# Patient Record
Sex: FEMALE | Race: WHITE | NOT HISPANIC OR LATINO | Employment: FULL TIME | URBAN - METROPOLITAN AREA
[De-identification: names, ages, dates, MRNs, and addresses within clinical notes are randomized per-mention and may not be internally consistent; named-entity substitution may affect disease eponyms.]

---

## 2017-01-17 ENCOUNTER — HOSPITAL ENCOUNTER (OUTPATIENT)
Dept: LAB | Facility: MEDICAL CENTER | Age: 42
End: 2017-01-17
Attending: FAMILY MEDICINE
Payer: COMMERCIAL

## 2017-01-17 ENCOUNTER — OFFICE VISIT (OUTPATIENT)
Dept: MEDICAL GROUP | Facility: MEDICAL CENTER | Age: 42
End: 2017-01-17
Payer: COMMERCIAL

## 2017-01-17 VITALS
BODY MASS INDEX: 32.14 KG/M2 | OXYGEN SATURATION: 96 % | WEIGHT: 200 LBS | TEMPERATURE: 98.3 F | SYSTOLIC BLOOD PRESSURE: 92 MMHG | DIASTOLIC BLOOD PRESSURE: 60 MMHG | HEART RATE: 71 BPM | HEIGHT: 66 IN

## 2017-01-17 DIAGNOSIS — T78.00XD: ICD-10-CM

## 2017-01-17 DIAGNOSIS — R53.82 CHRONIC FATIGUE: ICD-10-CM

## 2017-01-17 DIAGNOSIS — Z97.5 IUD (INTRAUTERINE DEVICE) IN PLACE: ICD-10-CM

## 2017-01-17 PROBLEM — T78.00XA ALLERGY WITH ANAPHYLAXIS DUE TO FOOD: Status: ACTIVE | Noted: 2017-01-17

## 2017-01-17 LAB
25(OH)D3 SERPL-MCNC: 18 NG/ML (ref 30–100)
BASOPHILS # BLD AUTO: 0.4 % (ref 0–1.8)
BASOPHILS # BLD: 0.03 K/UL (ref 0–0.12)
EOSINOPHIL # BLD AUTO: 0.12 K/UL (ref 0–0.51)
EOSINOPHIL NFR BLD: 1.7 % (ref 0–6.9)
ERYTHROCYTE [DISTWIDTH] IN BLOOD BY AUTOMATED COUNT: 41.7 FL (ref 35.9–50)
FERRITIN SERPL-MCNC: 81.5 NG/ML (ref 10–291)
FOLATE SERPL-MCNC: 21.1 NG/ML
HCT VFR BLD AUTO: 40.7 % (ref 37–47)
HGB BLD-MCNC: 14.6 G/DL (ref 12–16)
IMM GRANULOCYTES # BLD AUTO: 0.01 K/UL (ref 0–0.11)
IMM GRANULOCYTES NFR BLD AUTO: 0.1 % (ref 0–0.9)
IRON SATN MFR SERPL: 25 % (ref 15–55)
IRON SERPL-MCNC: 108 UG/DL (ref 40–170)
LYMPHOCYTES # BLD AUTO: 3.01 K/UL (ref 1–4.8)
LYMPHOCYTES NFR BLD: 41.7 % (ref 22–41)
MCH RBC QN AUTO: 32.7 PG (ref 27–33)
MCHC RBC AUTO-ENTMCNC: 35.9 G/DL (ref 33.6–35)
MCV RBC AUTO: 91.1 FL (ref 81.4–97.8)
MONOCYTES # BLD AUTO: 0.51 K/UL (ref 0–0.85)
MONOCYTES NFR BLD AUTO: 7.1 % (ref 0–13.4)
NEUTROPHILS # BLD AUTO: 3.53 K/UL (ref 2–7.15)
NEUTROPHILS NFR BLD: 49 % (ref 44–72)
NRBC # BLD AUTO: 0 K/UL
NRBC BLD AUTO-RTO: 0 /100 WBC
PLATELET # BLD AUTO: 197 K/UL (ref 164–446)
PMV BLD AUTO: 8.6 FL (ref 9–12.9)
RBC # BLD AUTO: 4.47 M/UL (ref 4.2–5.4)
TIBC SERPL-MCNC: 434 UG/DL (ref 250–450)
VIT B12 SERPL-MCNC: 590 PG/ML (ref 211–911)
WBC # BLD AUTO: 7.2 K/UL (ref 4.8–10.8)

## 2017-01-17 PROCEDURE — 82746 ASSAY OF FOLIC ACID SERUM: CPT

## 2017-01-17 PROCEDURE — 83550 IRON BINDING TEST: CPT

## 2017-01-17 PROCEDURE — 36415 COLL VENOUS BLD VENIPUNCTURE: CPT

## 2017-01-17 PROCEDURE — 85025 COMPLETE CBC W/AUTO DIFF WBC: CPT

## 2017-01-17 PROCEDURE — 99214 OFFICE O/P EST MOD 30 MIN: CPT | Performed by: FAMILY MEDICINE

## 2017-01-17 PROCEDURE — 82607 VITAMIN B-12: CPT

## 2017-01-17 PROCEDURE — 82306 VITAMIN D 25 HYDROXY: CPT

## 2017-01-17 PROCEDURE — 82728 ASSAY OF FERRITIN: CPT

## 2017-01-17 PROCEDURE — 83540 ASSAY OF IRON: CPT

## 2017-01-17 RX ORDER — DULAGLUTIDE 0.75 MG/.5ML
INJECTION, SOLUTION SUBCUTANEOUS
Refills: 12 | COMMUNITY
Start: 2016-12-26 | End: 2017-11-03

## 2017-01-17 RX ORDER — EPINEPHRINE 0.3 MG/.3ML
0.3 INJECTION SUBCUTANEOUS ONCE
Qty: 0.3 ML | Refills: 0 | Status: SHIPPED | OUTPATIENT
Start: 2017-01-17 | End: 2017-01-17

## 2017-01-17 RX ORDER — LEVOTHYROXINE SODIUM 175 UG/1
175 TABLET ORAL
Qty: 30 TAB | COMMUNITY
Start: 2017-01-17 | End: 2017-11-17

## 2017-01-17 NOTE — MR AVS SNAPSHOT
"        Aleena Diana   2017 4:00 PM   Office Visit   MRN: [id number]    Department:  [place] [address]   Dept Phone:  [phone]    Description:  Female : 1975   Provider:  Bright Barragan M.D.           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 Smoking Status             96% No Former Smoker         Basic Information     Date Of Birth Sex Race Ethnicity Preferred Language    1975 Female White Non- English      Your appointments     2017  4:00 PM   Established Patient with Myra Kurtz M.D.   [facility]    [address]  [address]  [facility] [id number] [phone]           You will be receiving a confirmation call a few days before your appointment from our automated call confirmation system.              Problem List              ICD-10-CM Priority Class Noted - Resolved    Diabetes type 2, controlled (CMS-HCC) E11.9   Unknown - Present    Hypothyroidism due to Hashimoto's thyroiditis E03.8, E06.3   10/6/2016 - Present    Obesity (BMI 30-39.9) E66.9   10/6/2016 - Present    Seasonal allergic rhinitis J30.2   10/6/2016 - Present    Erythema of lower extremity L53.9   10/6/2016 - Present    Mild intermittent asthma without " complication J45.20   10/6/2016 - Present    IUD (intrauterine device) in place Z97.5   1/17/2017 - Present    Chronic fatigue R53.82   1/17/2017 - Present    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   1/17/2017 - Present      Health Maintenance        Date Due Completion Dates    IMM HEP B VACCINE (1 of 3 - Primary Series) 1975 ---    A1C SCREENING 1975 ---    DIABETES MONOFILAMENT / LE EXAM 1975 ---    RETINAL SCREENING 3/23/1995 ---    FASTING LIPID PROFILE 3/23/1995 ---    URINE ACR / MICROALBUMIN 3/23/1995 ---    SERUM CREATININE 3/23/1995 ---    IMM DTaP/Tdap/Td Vaccine (1 - Tdap) 3/23/1996 ---    IMM PNEUMOCOCCAL 19-64 (ADULT) MEDIUM RISK SERIES (1 of 1 - PPSV23) 3/23/1996 ---    IMM INFLUENZA (1) 9/1/2016 ---    PAP SMEAR 1/25/2019 1/25/2016            Current Immunizations     No immunizations on file.      Below and/or attached are the medications your provider expects you to take. Review all of your home medications and newly ordered medications with your provider and/or pharmacist. Follow medication instructions as directed by your provider and/or pharmacist. Please keep your medication list with you and share with your provider. Update the information when medications are discontinued, doses are changed, or new medications (including over-the-counter products) are added; and carry medication information at all times in the event of emergency situations     Allergies:  CODEINE - (reactions not documented)     INFLUENZA VACCINES - Swelling     OTHER FOOD - (reactions not documented)     PENICILLINS - (reactions not documented)     TAPE - (reactions not documented)               Medications  Valid as of: January 17, 2017 -  4:23 PM    Generic Name Brand Name Tablet Size Instructions for use    Albuterol Sulfate (Aero Soln) albuterol 108 (90 BASE) MCG/ACT Inhale 2 Puffs by mouth every 6 hours as needed for Shortness of Breath.        Dulaglutide (Solution Pen-injector) TRULICITY 0.75  MG/0.5ML INJECT THE CONTENTS OF 1 SYRINGE SUBCUTANEOUSLY ONCE WEEKLY        EPINEPHrine (Solution Auto-injector) EPIPEN 0.3 MG/0.3ML 0.3 mL by Intramuscular route Once for 1 dose.        Levothyroxine Sodium (Tab) SYNTHROID 175 MCG Take 1 Tab by mouth Every morning on an empty stomach.        .                 Medicines prescribed today were sent to:     [id number] - [place], [place] - [address] [facility] [address]    [address] [place] [address]    Phone: [phone] Fax: [phone]    Open 24 Hours?: No      Medication refill instructions:       If your prescription bottle indicates you have medication refills left, it is not necessary to call your provider’s office. Please contact your pharmacy and they will refill your medication.    If your prescription bottle indicates you do not have any refills left, you may request refills at any time through one of the following ways: The online [employer] system (except Urgent Care), by calling your provider’s office, or by asking your pharmacy to contact your provider’s office with a refill request. Medication refills are processed only during regular business hours and may not be available until the next business day. Your provider may request additional information or to have a follow-up visit with you prior to refilling your medication.   *Please Note: Medication refills are assigned a new Rx number when refilled electronically. Your pharmacy may indicate that no refills were authorized even though a new prescription for the same medication is available at the pharmacy. Please request the medicine by name with the pharmacy before contacting your provider for a refill.        Your To Do List     Future Labs/Procedures Complete By Expires    CBC WITH DIFFERENTIAL  As directed 1/17/2018    FERRITIN  As directed 1/17/2018    FOLATE  As directed 1/17/2018    IRON/TOTAL IRON BIND  As directed 1/17/2018    VITAMIN B12  As  directed 1/17/2018    VITAMIN D,25 HYDROXY  As directed 1/17/2018         MyChart Access Code: Activation code not generated  Current [employer] Status: Active

## 2017-01-20 DIAGNOSIS — E55.9 VITAMIN D DEFICIENCY: ICD-10-CM

## 2017-01-20 RX ORDER — CHOLECALCIFEROL (VITAMIN D3) 1250 MCG
1 CAPSULE ORAL
Qty: 16 CAP | Refills: 0 | Status: SHIPPED | OUTPATIENT
Start: 2017-01-20 | End: 2019-11-29

## 2017-01-24 NOTE — PROGRESS NOTES
Subjective:     Chief Complaint   Patient presents with   • Follow-Up     feeling fatigue>>requesting for lab work///med refill epi pen       History of Present Illness:  Aleena Diana is a 41 y.o. female established patient who presents today to have initial medical evaluation of chronic fatigue (ongoing for ~2months), as well as medication refill for epipen:    Chronic fatigue  Patient complains of chronic fatigue with concerns that constant vaginal spotting is causing symptomatic anemia secondary to localized hormonal effects of hormone-type IUD that was recently put in place 2016.  Patient reports generalized weakness/fatigue, along with intermittent palpitations/tachycardia.      ROS is NEGATIVE for generalized pallor, dizziness, lightheadedness, blurred vision, chest pain/pressure, dyspnea, hematemesis, hemoptysis, hematochezia, melena, hematuria, pyuria, polyuria, increased frequency of urination, hand and foot tingling.    IUD (intrauterine device) in place  Please see note from same date of service 2017 re: Chronic fatigue.    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      Of note, patient states that her previous EpiPen prescription has .    ROS is NEGATIVE for increased lacrimation, b/l itchy eyes,  rhinorrhea, post-nasal drip, sneezing, wheezing, dyspnea, respiratory distress, palpitations, tachycardia, rash, generalized pruritis, rash/hives.      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)        Additional History:   Allergies:    Codeine; Influenza vaccines; Other food; Penicillins; and Tape     Current Medications:     Current Outpatient Prescriptions   Medication Sig Dispense Refill   • TRULICITY 0.75 MG/0.5ML Solution Pen-injector INJECT THE CONTENTS OF 1 SYRINGE SUBCUTANEOUSLY ONCE WEEKLY  12   • levothyroxine (SYNTHROID) 175 MCG Tab Take 1 Tab by mouth Every morning on an empty stomach. 30 Tab    • Cholecalciferol (VITAMIN D3) 93606 UNITS Cap Take 1 Cap by mouth every 7 days. 16 Cap 0   • albuterol 108 (90 BASE) MCG/ACT Aero Soln inhalation aerosol Inhale 2 Puffs by mouth every 6 hours as needed for Shortness of Breath. 8.5 g 3     No current facility-administered medications for this visit.        Social History:     Social History   Substance Use Topics   • Smoking status: Former Smoker     Quit date: 05/06/2013   • Smokeless tobacco: Never Used   • Alcohol Use: Yes      Comment: rare       ROS:     - ROS is NEGATIVE for vision/hearing changes, jaw pain/paresthesias, BUE pain/paresthesias/numbness/weakness, chest pain/pressure, palpitations, dyspnea, RUQ abdominal pain, oliguria/anuria, BLE edema.    - NOTE: All other systems reviewed and are negative, except as in HPI.     Objective:   Physical Exam:    Vitals: Blood pressure 92/60, pulse 71, temperature 36.8 °C (98.3 °F), height 1.676 m (5' 5.98\"), weight 90.719 kg (200 lb), SpO2 96 %, not currently breastfeeding.   BMI: Body mass index is 32.3 kg/(m^2).   General/Constitutional: Vitals as above, Well nourished, well developed female in no acute distress   Head/Eyes: Head is grossly normal & atraumatic, bilateral conjunctivae clear and not injected, bilateral EOMI, bilateral PERRL   ENT: Bilateral external ears grossly normal in appearance, Hearing grossly intact, External nares normal in appearance and without " discharge/bleeding   Respiratory: No respiratory distress, bilateral lungs are clear to ausculation in all lung fields (anterior/lateral/posterior), no wheezing/rhonchi/rales   Cardiovascular: Regular rate and rhythm without murmur/gallops/rubs, distal pulses are intact and equal bilaterally (radial, posterior tibial), no bilateral lower extremity edema   MSK: Gait grossly normal & not antalgic   Integumentary: No apparent rashes, normal capillary refill (<2 seconds), no generalized pallor   Psych: Judgment grossly appropriate, no apparent depression/anxiety    Assessment and Plan:   1. Chronic fatigue  Unstable, uncontrolled.  Patient and I discussed her differential diagnosis with two most likely etiologies being subacute  blood loss anemia vs. Vitamin D deficiency.  We will run the battery of labs as below to further evaluate.   - CBC WITH DIFFERENTIAL; Future   - VITAMIN D,25 HYDROXY; Future   - VITAMIN B12; Future   - IRON/TOTAL IRON BIND; Future   - FERRITIN; Future   - FOLATE; Future    2. IUD (intrauterine device) in place  Stable, in place since 11/2016.  Patient encouraged to follow-up with OB/Gyn for IUD counseling, in light of chronic vaginal spotting if not also for subacute  blood loss anemia.  Labs as above.    3. 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  Refilled Epipen.  Patient to consider further allergenic testing in the future.   - EPINEPHrine (EPIPEN) 0.3 MG/0.3ML Solution Auto-injector solution for injection; 0.3 mL by Intramuscular route Once for 1 dose.  Dispense: 0.3 mL; Refill: 0      PLEASE NOTE: This dictation was created using voice recognition software. I have made every reasonable attempt to correct obvious errors, but I expect that there are errors of grammar and possibly content that I did not discover before finalizing the note.

## 2017-01-24 NOTE — ASSESSMENT & PLAN NOTE
Patient states that she has former allergic reaction to certain foods (particularly nuts) which can cause feelings of airway closure.      Of note, patient states that her previous EpiPen prescription has .    ROS is NEGATIVE for increased lacrimation, b/l itchy eyes,  rhinorrhea, post-nasal drip, sneezing, wheezing, dyspnea, respiratory distress, palpitations, tachycardia, rash, generalized pruritis, rash/hives.

## 2017-01-24 NOTE — ASSESSMENT & PLAN NOTE
Patient complains of chronic fatigue with concerns that constant vaginal spotting is causing symptomatic anemia secondary to localized hormonal effects of hormone-type IUD that was recently put in place 11/2016.  Patient reports generalized weakness/fatigue, along with intermittent palpitations/tachycardia.      ROS is NEGATIVE for generalized pallor, dizziness, lightheadedness, blurred vision, chest pain/pressure, dyspnea, hematemesis, hemoptysis, hematochezia, melena, hematuria, pyuria, polyuria, increased frequency of urination, hand and foot tingling.

## 2017-03-01 ENCOUNTER — OFFICE VISIT (OUTPATIENT)
Dept: MEDICAL GROUP | Facility: CLINIC | Age: 42
End: 2017-03-01
Payer: COMMERCIAL

## 2017-03-01 VITALS
WEIGHT: 203.8 LBS | HEART RATE: 78 BPM | TEMPERATURE: 97.5 F | OXYGEN SATURATION: 97 % | RESPIRATION RATE: 18 BRPM | HEIGHT: 66 IN | SYSTOLIC BLOOD PRESSURE: 112 MMHG | BODY MASS INDEX: 32.75 KG/M2 | DIASTOLIC BLOOD PRESSURE: 62 MMHG

## 2017-03-01 DIAGNOSIS — B37.81 THRUSH OF MOUTH AND ESOPHAGUS (HCC): ICD-10-CM

## 2017-03-01 DIAGNOSIS — B37.0 THRUSH OF MOUTH AND ESOPHAGUS (HCC): ICD-10-CM

## 2017-03-01 PROCEDURE — 99213 OFFICE O/P EST LOW 20 MIN: CPT | Performed by: NURSE PRACTITIONER

## 2017-03-01 ASSESSMENT — ENCOUNTER SYMPTOMS
SORE THROAT: 0
CHILLS: 0
NAUSEA: 0
FEVER: 0
VOMITING: 0

## 2017-03-01 NOTE — PROGRESS NOTES
Chief Complaint   Patient presents with   • Mouth Lesions     her mouth feels raw, on monday her partial was cleaned with lysol wipes since it feels raw like she has throush        HISTORY OF PRESENT ILLNESS: Patient is a 41 y.o. female established patient who presents today due to possible mouth thrush. Patient reported that then she went to his dentist on Monday and her dentures top on the floor, they cleaned it and gave that back to her. She place her denture back and went home and started feeling raw, weird. She called back to her dentist and was told that they cleaning her denture with Lysol wipes. She felt the feeling is like mouth thrush and she usually has that when she is taking antibiotics so she knows the feeling. She started taking probiotic and she also took one dose of Diflucan but she is still worried about that and would like to be checked. She denied any nausea, vomiting, trouble swallowing, burning sensation or rashes.    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 16 Cap 0   • TRULICITY 0.75 MG/0.5ML Solution Pen-injector INJECT THE CONTENTS OF 1 SYRINGE SUBCUTANEOUSLY ONCE WEEKLY  12   • levothyroxine (SYNTHROID) 175 MCG Tab Take 1 Tab by mouth Every morning on an empty stomach. 30 Tab    • albuterol 108 (90 BASE)  "MCG/ACT Aero Soln inhalation aerosol Inhale 2 Puffs by mouth every 6 hours as needed for Shortness of Breath. 8.5 g 3     No current facility-administered medications for this visit.       Social History   Substance Use Topics   • Smoking status: Former Smoker     Quit date: 05/06/2013   • Smokeless tobacco: Never Used   • Alcohol Use: Yes      Comment: rare       Family Status   Relation Status Death Age   • Mother Alive    • Father Alive      Family History   Problem Relation Age of Onset   • Hypertension Mother    • Hypertension Father          ROS:  Review of Systems   Constitutional: Negative for fever and chills.   HENT: Negative for sore throat.    Gastrointestinal: Negative for nausea and vomiting.   Skin: Negative for itching and rash.        Objective:     Blood pressure 112/62, pulse 78, temperature 36.4 °C (97.5 °F), resp. rate 18, height 1.676 m (5' 6\"), weight 92.443 kg (203 lb 12.8 oz), last menstrual period 11/30/2016, SpO2 97 %, not currently breastfeeding.     Physical Exam:  Physical Exam   Constitutional: She is oriented to person, place, and time and well-developed, well-nourished, and in no distress.   HENT:   Head: Normocephalic and atraumatic.   Thin layer of white film noted on her hard palate.   Eyes: Conjunctivae are normal.   Neck: Neck supple. No JVD present.   Cardiovascular: Normal rate.    Pulmonary/Chest: Effort normal.   Neurological: She is alert and oriented to person, place, and time.   Skin: Skin is warm. No erythema.   Vitals reviewed.        Assessment/Plan:  1. Thrush of mouth and esophagus (CMS-HCC)  - nystatin (MYCOSTATIN) 443422 UNIT/ML Suspension; Take 5 mL by mouth 4 times a day. swish in the mouth and retain for as long as possible (several minutes) before swallowing  Dispense: 100 mL; Refill: 1  - Instructed patient to call back if worsening symptoms or new symptoms. She verbalized understanding.      "

## 2017-03-01 NOTE — MR AVS SNAPSHOT
"        Aleena Diana   3/1/2017 3:20 PM   Office Visit   MRN: [id number]    Department:  [facility]   Dept Phone:  [phone]    Description:  Female : 1975   Provider:  OSKAR Maldonado           Reason for Visit     Mouth Lesions her mouth feels raw, on monday her partial was cleaned with lysol wipes since it feels raw like she has throush       Allergies as of 3/1/2017     Allergen Noted Reactions    Codeine 2016       Influenza Vaccines 10/06/2016   Swelling    Arm became swollen and red    Other Food 2016       Nuts    Penicillins 2016       Tape 2016         You were diagnosed with     Thrush of mouth and esophagus (CMS-[facility])   [110337]         Vital Signs     Blood Pressure Pulse Temperature Respirations Height Weight    112/62 mmHg 78 36.4 °C (97.5 °F) 18 1.676 m (5' 6\") 92.443 kg (203 lb 12.8 oz)    Body Mass Index Oxygen Saturation Last Menstrual Period Breastfeeding? Smoking Status       32.91 kg/m2 97% 2016 No Former Smoker       Basic Information     Date Of Birth Sex Race Ethnicity Preferred Language    1975 Female White Non- English      Your appointments     2017  4:00 PM   Established Patient with Myra Kurtz M.D.   [facility] (--)    [address] [place] [id number] [phone]           You will be receiving a confirmation call a few days before your appointment from our automated call confirmation system.              Problem List              ICD-10-CM Priority Class Noted - Resolved    Diabetes type 2, controlled (CMS-[facility]) E11.9   Unknown - Present    Hypothyroidism due to Hashimoto's thyroiditis E03.8, E06.3   10/6/2016 - Present    Obesity (BMI 30-39.9) E66.9   10/6/2016 - Present    Seasonal allergic rhinitis J30.2   10/6/2016 - Present    Erythema of lower extremity L53.9   10/6/2016 - Present    Mild intermittent asthma without complication J45.20   " 10/6/2016 - Present    IUD (intrauterine device) in place Z97.5   1/17/2017 - Present    Chronic fatigue R53.82   1/17/2017 - Present    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   1/17/2017 - Present    Vitamin D deficiency E55.9   1/20/2017 - Present      Health Maintenance        Date Due Completion Dates    IMM HEP B VACCINE (1 of 3 - Primary Series) 1975 ---    A1C SCREENING 1975 ---    DIABETES MONOFILAMENT / LE EXAM 1977 ---    RETINAL SCREENING 3/23/1995 ---    FASTING LIPID PROFILE 3/23/1995 ---    URINE ACR / MICROALBUMIN 3/23/1995 ---    SERUM CREATININE 3/23/1995 ---    IMM DTaP/Tdap/Td Vaccine (1 - Tdap) 3/23/1996 ---    IMM PNEUMOCOCCAL 19-64 (ADULT) MEDIUM RISK SERIES (1 of 1 - PPSV23) 3/23/1996 ---    IMM INFLUENZA (1) 9/1/2016 ---    PAP SMEAR 1/25/2019 1/25/2016            Current Immunizations     No immunizations on file.      Below and/or attached are the medications your provider expects you to take. Review all of your home medications and newly ordered medications with your provider and/or pharmacist. Follow medication instructions as directed by your provider and/or pharmacist. Please keep your medication list with you and share with your provider. Update the information when medications are discontinued, doses are changed, or new medications (including over-the-counter products) are added; and carry medication information at all times in the event of emergency situations     Allergies:  CODEINE - (reactions not documented)     INFLUENZA VACCINES - Swelling     OTHER FOOD - (reactions not documented)     PENICILLINS - (reactions not documented)     TAPE - (reactions not documented)               Medications  Valid as of: March 01, 2017 -  4:32 PM    Generic Name Brand Name Tablet Size Instructions for use    Albuterol Sulfate (Aero Soln) albuterol 108 (90 BASE) MCG/ACT Inhale 2 Puffs by mouth every 6 hours as needed for Shortness of Breath.        Cholecalciferol (Cap)  Vitamin D3 89749 UNITS Take 1 Cap by mouth every 7 days.        Dulaglutide (Solution Pen-injector) TRULICITY 0.75 MG/0.5ML INJECT THE CONTENTS OF 1 SYRINGE SUBCUTANEOUSLY ONCE WEEKLY        Levothyroxine Sodium (Tab) SYNTHROID 175 MCG Take 1 Tab by mouth Every morning on an empty stomach.        Nystatin (Suspension) MYCOSTATIN 513555 UNIT/ML Take 5 mL by mouth 4 times a day. swish in the mouth and retain for as long as possible (several minutes) before swallowing        .                 Medicines prescribed today were sent to:     [id number] - [place], [place] - [address]  [facility] [address] [facility] [address] [place]    [address] [facility] [address] [place] [address]    Phone: [phone] Fax: [phone]    Open 24 Hours?: No      Medication refill instructions:       If your prescription bottle indicates you have medication refills left, it is not necessary to call your provider’s office. Please contact your pharmacy and they will refill your medication.    If your prescription bottle indicates you do not have any refills left, you may request refills at any time through one of the following ways: The online [employer] system (except Urgent Care), by calling your provider’s office, or by asking your pharmacy to contact your provider’s office with a refill request. Medication refills are processed only during regular business hours and may not be available until the next business day. Your provider may request additional information or to have a follow-up visit with you prior to refilling your medication.   *Please Note: Medication refills are assigned a new Rx number when refilled electronically. Your pharmacy may indicate that no refills were authorized even though a new prescription for the same medication is available at the pharmacy. Please request the medicine by name with the pharmacy before contacting your provider for a refill.           [employer] Access Code: Activation code not generated  Current  MyChart Status: Active

## 2017-04-19 ENCOUNTER — OFFICE VISIT (OUTPATIENT)
Dept: MEDICAL GROUP | Facility: LAB | Age: 42
End: 2017-04-19
Payer: COMMERCIAL

## 2017-04-19 VITALS
RESPIRATION RATE: 16 BRPM | BODY MASS INDEX: 32.74 KG/M2 | HEART RATE: 75 BPM | WEIGHT: 203.71 LBS | TEMPERATURE: 97.6 F | HEIGHT: 66 IN | DIASTOLIC BLOOD PRESSURE: 76 MMHG | OXYGEN SATURATION: 92 % | SYSTOLIC BLOOD PRESSURE: 96 MMHG

## 2017-04-19 DIAGNOSIS — L53.9 ERYTHEMA OF LOWER EXTREMITY: ICD-10-CM

## 2017-04-19 DIAGNOSIS — Z12.31 SCREENING MAMMOGRAM, ENCOUNTER FOR: ICD-10-CM

## 2017-04-19 DIAGNOSIS — E11.9 CONTROLLED TYPE 2 DIABETES MELLITUS WITHOUT COMPLICATION, WITHOUT LONG-TERM CURRENT USE OF INSULIN (HCC): ICD-10-CM

## 2017-04-19 DIAGNOSIS — E55.9 VITAMIN D DEFICIENCY: ICD-10-CM

## 2017-04-19 DIAGNOSIS — M71.371 OTHER BURSAL CYST, RIGHT ANKLE AND FOOT: ICD-10-CM

## 2017-04-19 DIAGNOSIS — R09.81 CHRONIC NASAL CONGESTION: ICD-10-CM

## 2017-04-19 DIAGNOSIS — J30.2 SEASONAL ALLERGIC RHINITIS, UNSPECIFIED ALLERGIC RHINITIS TRIGGER: ICD-10-CM

## 2017-04-19 PROBLEM — J35.8 TONSILLITH: Status: ACTIVE | Noted: 2017-04-19

## 2017-04-19 PROCEDURE — 99214 OFFICE O/P EST MOD 30 MIN: CPT | Performed by: FAMILY MEDICINE

## 2017-04-19 NOTE — MR AVS SNAPSHOT
"        Aleena Jewell Adalgisa   2017 4:00 PM   Office Visit   MRN: [id number]    Department:  [facility]   Dept Phone:  [phone]    Description:  Female : 1975   Provider:  Myra Kurtz M.D.           Reason for Visit     Follow-Up     Diabetes           Allergies as of 2017     Allergen Noted Reactions    Codeine 2016       Influenza Vaccines 10/06/2016   Swelling    Arm became swollen and red    Other Food 2016       Nuts    Penicillins 2016       Tape 2016         You were diagnosed with     Controlled type 2 diabetes mellitus without complication, without long-term current use of insulin (CMS-[facility])   [7799778]       Vitamin D deficiency   [7286190]       Screening mammogram, encounter for   [3798040]       Seasonal allergic rhinitis, unspecified allergic rhinitis trigger   [5684997]       Chronic nasal congestion   [728520]       Erythema of lower extremity   [3358593]       Other bursal cyst, right ankle and foot   [193608]         Vital Signs     Blood Pressure Pulse Temperature Respirations Height Weight    96/76 mmHg 75 36.4 °C (97.6 °F) 16 1.676 m (5' 5.98\") 92.4 kg (203 lb 11.3 oz)    Body Mass Index Oxygen Saturation Smoking Status             32.89 kg/m2 92% Former Smoker         Basic Information     Date Of Birth Sex Race Ethnicity Preferred Language    1975 Female White Non- English      Your appointments     Oct 19, 2017  4:00 PM   Established Patient with Myra Kurtz M.D.   [facility] (--)    [address] [address] [address] [phone]           You will be receiving a confirmation call a few days before your appointment from our automated call confirmation system.              Problem List              ICD-10-CM Priority Class Noted - Resolved    Diabetes type 2, controlled (CMS-[facility]) E11.9   Unknown - Present    Hypothyroidism due to Hashimoto's thyroiditis E03.8, " E06.3   10/6/2016 - Present    Obesity (BMI 30-39.9) E66.9   10/6/2016 - Present    Seasonal allergic rhinitis J30.2   10/6/2016 - Present    Erythema of lower extremity L53.9   10/6/2016 - Present    Mild intermittent asthma without complication J45.20   10/6/2016 - Present    IUD (intrauterine device) in place Z97.5   1/17/2017 - Present    Chronic fatigue R53.82   1/17/2017 - Present    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   1/17/2017 - Present    Vitamin D deficiency E55.9   1/20/2017 - Present      Health Maintenance        Date Due Completion Dates    IMM HEP B VACCINE (1 of 3 - Primary Series) 1975 ---    A1C SCREENING 1975 ---    DIABETES MONOFILAMENT / LE EXAM 1975 ---    RETINAL SCREENING 3/23/1995 ---    FASTING LIPID PROFILE 3/23/1995 ---    URINE ACR / MICROALBUMIN 3/23/1995 ---    SERUM CREATININE 3/23/1995 ---    IMM DTaP/Tdap/Td Vaccine (1 - Tdap) 3/23/1996 ---    IMM PNEUMOCOCCAL 19-64 (ADULT) MEDIUM RISK SERIES (1 of 1 - PPSV23) 3/23/1996 ---    MAMMOGRAM 3/23/2017 ---    PAP SMEAR 1/25/2019 1/25/2016            Current Immunizations     No immunizations on file.      Below and/or attached are the medications your provider expects you to take. Review all of your home medications and newly ordered medications with your provider and/or pharmacist. Follow medication instructions as directed by your provider and/or pharmacist. Please keep your medication list with you and share with your provider. Update the information when medications are discontinued, doses are changed, or new medications (including over-the-counter products) are added; and carry medication information at all times in the event of emergency situations     Allergies:  CODEINE - (reactions not documented)     INFLUENZA VACCINES - Swelling     OTHER FOOD - (reactions not documented)     PENICILLINS - (reactions not documented)     TAPE - (reactions not documented)               Medications  Valid as of: April  19, 2017 -  4:32 PM    Generic Name Brand Name Tablet Size Instructions for use    Albuterol Sulfate (Aero Soln) albuterol 108 (90 BASE) MCG/ACT Inhale 2 Puffs by mouth every 6 hours as needed for Shortness of Breath.        Cholecalciferol (Cap) Vitamin D3 46455 UNITS Take 1 Cap by mouth every 7 days.        Dulaglutide (Solution Pen-injector) TRULICITY 0.75 MG/0.5ML INJECT THE CONTENTS OF 1 SYRINGE SUBCUTANEOUSLY ONCE WEEKLY        Levothyroxine Sodium (Tab) SYNTHROID 175 MCG Take 1 Tab by mouth Every morning on an empty stomach.        Nystatin (Suspension) MYCOSTATIN 344647 UNIT/ML Take 5 mL by mouth 4 times a day. swish in the mouth and retain for as long as possible (several minutes) before swallowing        .                 Medicines prescribed today were sent to:     [facility] [id number] [facility], [place] - [address] [facility] [address] [place] [address] & [place]    [address] [facility] [address] [place] [address]    Phone: [phone] Fax: [phone]    Open 24 Hours?: No      Medication refill instructions:       If your prescription bottle indicates you have medication refills left, it is not necessary to call your provider’s office. Please contact your pharmacy and they will refill your medication.    If your prescription bottle indicates you do not have any refills left, you may request refills at any time through one of the following ways: The online [employer] system (except Urgent Care), by calling your provider’s office, or by asking your pharmacy to contact your provider’s office with a refill request. Medication refills are processed only during regular business hours and may not be available until the next business day. Your provider may request additional information or to have a follow-up visit with you prior to refilling your medication.   *Please Note: Medication refills are assigned a new Rx number when refilled electronically. Your pharmacy may indicate that no refills were authorized even though  a new prescription for the same medication is available at the pharmacy. Please request the medicine by name with the pharmacy before contacting your provider for a refill.        Referral     A referral request has been sent to our patient care coordination department. Please allow 3-5 business days for us to process this request and contact you either by phone or mail. If you do not hear from us by the 5th business day, please call us at [phone].           [place] Self [employer] Document Preparation Access Code: Activation code not generated  Current [place] Self Representation Document Preparation Status: Active

## 2017-04-20 DIAGNOSIS — R79.89 ELEVATED LFTS: ICD-10-CM

## 2017-04-20 DIAGNOSIS — E11.9 CONTROLLED TYPE 2 DIABETES MELLITUS WITHOUT COMPLICATION, WITHOUT LONG-TERM CURRENT USE OF INSULIN (HCC): ICD-10-CM

## 2017-04-20 NOTE — PROGRESS NOTES
"Subjective:   Aleena Diana is a 42 y.o. female here today for   Chief Complaint   Patient presents with   • Follow-Up   • Diabetes       1. Controlled type 2 diabetes mellitus without complication, without long-term current use of insulin (CMS-[facility])  This is controlled by her endocrinologist Dr. Oscar. This is chronic. Stable. Currently taking \"a medication that you inject once a day that is not insulin\" as directed. She is also taking a daily aspirin, statin, and ACE-I/ARB.   Denies side effects or hypoglycemic events from medications.  Last HbA1c is done about one month ago with [facility]. She is unsure of the results  She is monitoring blood sugar regularly at home. Fasting blood sugars are 120-130  She is not exercising regularly.  Last eye exam: Yearly in [place]  Doing regular foot exams and care.  Denies polyuria, polydipsia, blurred vision, early satiety, or neuropathies    2. Vitamin D deficiency  This is chronic. Last vitamin D level was 18. She generally takes 5000 units of vitamin D daily. Currently, she is on 50,000 units weekly. She states that she feels more energetic on this.    3. Seasonal allergic rhinitis, unspecified allergic rhinitis trigger  4. Chronic nasal congestion  This is chronic. Patient was prescribed Flonase in the past which did not help her. She is this for 6 weeks without any improvement. The congestion is generally on the right side. She does not get any itchy eyes or ears or throat. It is not solely dependent on the season, she states that she is congested regardless of the time of year or which she has around. She has not had ENT evaluation for this.    5. Erythema of lower extremity  The patient has hemosiderin staining on her bilateral lower extremities from the mid shin down to her ankles. This began after the birth of her 2-year-old. She had severe anemia at the time of the birth. She has good circulation in legs. Her feet are warm and well perfused. She does not have " "known chronic venous insufficiency. The discoloration bothers her. She had a referral to dermatology but she did not see them.     6. Other bursal cyst, right ankle and foot  This is chronic. Patient has a cyst near her acutely. It has been there since 2008. She has never had anybody look at this. She states that  there is no pain in this does not inhibit her walking      7.. Screening mammogram, encounter for  Patient has never had a mammogram      Current medicines (including changes today)  Current Outpatient Prescriptions   Medication Sig Dispense Refill   • nystatin (MYCOSTATIN) 426237 UNIT/ML Suspension Take 5 mL by mouth 4 times a day. swish in the mouth and retain for as long as possible (several minutes) before swallowing 100 mL 1   • Cholecalciferol (VITAMIN D3) 76029 UNITS Cap Take 1 Cap by mouth every 7 days. 16 Cap 0   • TRULICITY 0.75 MG/0.5ML Solution Pen-injector INJECT THE CONTENTS OF 1 SYRINGE SUBCUTANEOUSLY ONCE WEEKLY  12   • levothyroxine (SYNTHROID) 175 MCG Tab Take 1 Tab by mouth Every morning on an empty stomach. 30 Tab    • albuterol 108 (90 BASE) MCG/ACT Aero Soln inhalation aerosol Inhale 2 Puffs by mouth every 6 hours as needed for Shortness of Breath. 8.5 g 3     No current facility-administered medications for this visit.     She  has a past medical history of Thyroid disease and Diabetes (CMS-[facility]).    ROS   No fevers  No bowel changes  No LE edema       Objective:     Blood pressure 96/76, pulse 75, temperature 36.4 °C (97.6 °F), resp. rate 16, height 1.676 m (5' 5.98\"), weight 92.4 kg (203 lb 11.3 oz), SpO2 92 %. Body mass index is 32.89 kg/(m^2).   Physical Exam:  Constitutional: Alert, no distress.  Skin: Warm, dry, good turgor, no rashes in visible areas.  Eye: Equal, round and reactive, conjunctiva clear, lids normal.  ENMT: Lips without lesions, good dentition, oropharynx clear. Nasal turbinates are edematous bilaterally, cannot see past the opening of the near due to " swelling  Neck: Trachea midline, no masses, no thyromegaly. No cervical or supraclavicular lymphadenopathy  Respiratory: Unlabored respiratory effort, lungs clear to auscultation, no wheezes, no ronchi.  Cardiovascular: Normal S1, S2, RRR, no murmur, no edema.  Abdomen: Soft, non-tender, no masses, no hepatosplenomegaly.  Psych: Alert and oriented x3, normal affect and mood.   Extremities: Hemosiderin staining bilaterally lower extremities. There is a cyst just below the Achilles on the calcaneus of the right foot  Monofilament testing with a 10 gram force: sensation: intact bilaterally  Visual Inspection: Feet without maceration, ulcers, or fissures.  Pedal pulses: intact bilaterally        Assessment and Plan:   The following treatment plan was discussed    1. Controlled type 2 diabetes mellitus without complication, without long-term current use of insulin (CMS-[facility])  Chronic, stable, patient unsure of the medication she is on  This is controlled by Dr. Oscar  I have requested her labs from [email or web address] to update our system  Foot exam normal  Eye exam is done yearly, we have requested these records  - Patient identified as having weight management issue.  Appropriate orders and counseling given.  - Diabetic Monofilament Lower Extremity Exam    2. Vitamin D deficiency  Chronic, stable, we will need to repeat her labs in 6 months  Continue vitamin D supplementation    3. Seasonal allergic rhinitis, unspecified allergic rhinitis trigger  4. Chronic nasal congestion  Chronic, unstable, I am concerned about nasal polyps given the chronicity of her nasal congestion. I referred her to ENT  - REFERRAL TO ENT    5. Erythema of lower extremity  This is chronic and stable. It is hemosiderin staining. She would like to see dermatology to see if there is anything she can do. A referral has been placed.  - REFERRAL TO DERMATOLOGY    6. Other bursal cyst, right ankle and foot  Chronic and stable. Referral to podiatry for  removal  - REFERRAL TO PODIATRY    7. Screening mammogram, encounter for  - MA-SCREEN MAMMO W/CAD-BILAT      Followup: Return in about 6 months (around 10/19/2017).       This note was created using voice recognition software. I have made every reasonable attempt to correct errors, however, I do anticipate some grammatical errors.

## 2017-04-21 PROBLEM — K76.0 FATTY LIVER: Status: ACTIVE | Noted: 2017-04-21

## 2017-05-13 ENCOUNTER — HOSPITAL ENCOUNTER (EMERGENCY)
Facility: MEDICAL CENTER | Age: 42
End: 2017-05-13
Attending: EMERGENCY MEDICINE
Payer: COMMERCIAL

## 2017-05-13 ENCOUNTER — APPOINTMENT (OUTPATIENT)
Dept: RADIOLOGY | Facility: MEDICAL CENTER | Age: 42
End: 2017-05-13
Attending: EMERGENCY MEDICINE
Payer: COMMERCIAL

## 2017-05-13 VITALS
TEMPERATURE: 97.3 F | WEIGHT: 194 LBS | BODY MASS INDEX: 31.18 KG/M2 | DIASTOLIC BLOOD PRESSURE: 83 MMHG | HEIGHT: 66 IN | SYSTOLIC BLOOD PRESSURE: 132 MMHG | OXYGEN SATURATION: 93 % | HEART RATE: 82 BPM | RESPIRATION RATE: 18 BRPM

## 2017-05-13 DIAGNOSIS — R11.0 NAUSEA: ICD-10-CM

## 2017-05-13 DIAGNOSIS — R19.7 DIARRHEA, UNSPECIFIED TYPE: ICD-10-CM

## 2017-05-13 DIAGNOSIS — R10.10 PAIN OF UPPER ABDOMEN: ICD-10-CM

## 2017-05-13 LAB
ALBUMIN SERPL BCP-MCNC: 4.7 G/DL (ref 3.2–4.9)
ALBUMIN/GLOB SERPL: 1.5 G/DL
ALP SERPL-CCNC: 63 U/L (ref 30–99)
ALT SERPL-CCNC: 87 U/L (ref 2–50)
ANION GAP SERPL CALC-SCNC: 10 MMOL/L (ref 0–11.9)
APPEARANCE UR: CLEAR
AST SERPL-CCNC: 53 U/L (ref 12–45)
BASOPHILS # BLD AUTO: 0.2 % (ref 0–1.8)
BASOPHILS # BLD: 0.02 K/UL (ref 0–0.12)
BILIRUB SERPL-MCNC: 0.8 MG/DL (ref 0.1–1.5)
BUN SERPL-MCNC: 9 MG/DL (ref 8–22)
CALCIUM SERPL-MCNC: 9.5 MG/DL (ref 8.5–10.5)
CHLORIDE SERPL-SCNC: 106 MMOL/L (ref 96–112)
CO2 SERPL-SCNC: 19 MMOL/L (ref 20–33)
COLOR UR AUTO: YELLOW
CREAT SERPL-MCNC: 0.78 MG/DL (ref 0.5–1.4)
EOSINOPHIL # BLD AUTO: 0.05 K/UL (ref 0–0.51)
EOSINOPHIL NFR BLD: 0.5 % (ref 0–6.9)
ERYTHROCYTE [DISTWIDTH] IN BLOOD BY AUTOMATED COUNT: 41.9 FL (ref 35.9–50)
GFR SERPL CREATININE-BSD FRML MDRD: >60 ML/MIN/1.73 M 2
GLOBULIN SER CALC-MCNC: 3.1 G/DL (ref 1.9–3.5)
GLUCOSE SERPL-MCNC: 176 MG/DL (ref 65–99)
GLUCOSE UR QL STRIP.AUTO: NEGATIVE MG/DL
HCG UR QL: NEGATIVE
HCT VFR BLD AUTO: 44.5 % (ref 37–47)
HGB BLD-MCNC: 15.3 G/DL (ref 12–16)
IMM GRANULOCYTES # BLD AUTO: 0.03 K/UL (ref 0–0.11)
IMM GRANULOCYTES NFR BLD AUTO: 0.3 % (ref 0–0.9)
KETONES UR QL STRIP.AUTO: 15 MG/DL
LEUKOCYTE ESTERASE UR QL STRIP.AUTO: NEGATIVE
LIPASE SERPL-CCNC: 41 U/L (ref 11–82)
LYMPHOCYTES # BLD AUTO: 1.65 K/UL (ref 1–4.8)
LYMPHOCYTES NFR BLD: 15.9 % (ref 22–41)
MCH RBC QN AUTO: 32.1 PG (ref 27–33)
MCHC RBC AUTO-ENTMCNC: 34.4 G/DL (ref 33.6–35)
MCV RBC AUTO: 93.5 FL (ref 81.4–97.8)
MONOCYTES # BLD AUTO: 0.63 K/UL (ref 0–0.85)
MONOCYTES NFR BLD AUTO: 6.1 % (ref 0–13.4)
NEUTROPHILS # BLD AUTO: 8.02 K/UL (ref 2–7.15)
NEUTROPHILS NFR BLD: 77 % (ref 44–72)
NITRITE UR QL STRIP.AUTO: NEGATIVE
NRBC # BLD AUTO: 0 K/UL
NRBC BLD AUTO-RTO: 0 /100 WBC
PH UR STRIP.AUTO: 5.5 [PH]
PLATELET # BLD AUTO: 229 K/UL (ref 164–446)
PMV BLD AUTO: 8.6 FL (ref 9–12.9)
POTASSIUM SERPL-SCNC: 3.7 MMOL/L (ref 3.6–5.5)
PROT SERPL-MCNC: 7.8 G/DL (ref 6–8.2)
PROT UR QL STRIP: ABNORMAL MG/DL
RBC # BLD AUTO: 4.76 M/UL (ref 4.2–5.4)
RBC UR QL AUTO: NEGATIVE
SODIUM SERPL-SCNC: 135 MMOL/L (ref 135–145)
SP GR UR: >=1.03
WBC # BLD AUTO: 10.4 K/UL (ref 4.8–10.8)

## 2017-05-13 PROCEDURE — 96375 TX/PRO/DX INJ NEW DRUG ADDON: CPT

## 2017-05-13 PROCEDURE — 96374 THER/PROPH/DIAG INJ IV PUSH: CPT

## 2017-05-13 PROCEDURE — 83690 ASSAY OF LIPASE: CPT

## 2017-05-13 PROCEDURE — 81025 URINE PREGNANCY TEST: CPT

## 2017-05-13 PROCEDURE — 700105 HCHG RX REV CODE 258: Performed by: EMERGENCY MEDICINE

## 2017-05-13 PROCEDURE — 700111 HCHG RX REV CODE 636 W/ 250 OVERRIDE (IP): Performed by: EMERGENCY MEDICINE

## 2017-05-13 PROCEDURE — 81002 URINALYSIS NONAUTO W/O SCOPE: CPT

## 2017-05-13 PROCEDURE — 80053 COMPREHEN METABOLIC PANEL: CPT

## 2017-05-13 PROCEDURE — 85025 COMPLETE CBC W/AUTO DIFF WBC: CPT

## 2017-05-13 PROCEDURE — 36415 COLL VENOUS BLD VENIPUNCTURE: CPT

## 2017-05-13 PROCEDURE — 74176 CT ABD & PELVIS W/O CONTRAST: CPT

## 2017-05-13 PROCEDURE — 99285 EMERGENCY DEPT VISIT HI MDM: CPT

## 2017-05-13 RX ORDER — MECOBALAMIN 5000 MCG
15 TABLET,DISINTEGRATING ORAL DAILY
Qty: 22 CAP | Refills: 0 | Status: SHIPPED | OUTPATIENT
Start: 2017-05-13 | End: 2017-09-26 | Stop reason: SDUPTHER

## 2017-05-13 RX ORDER — SODIUM CHLORIDE 9 MG/ML
INJECTION, SOLUTION INTRAVENOUS CONTINUOUS
Status: DISCONTINUED | OUTPATIENT
Start: 2017-05-13 | End: 2017-05-13 | Stop reason: HOSPADM

## 2017-05-13 RX ORDER — SUCRALFATE 1 G/1
1 TABLET ORAL
Qty: 44 TAB | Refills: 4 | Status: SHIPPED | OUTPATIENT
Start: 2017-05-13 | End: 2017-11-03

## 2017-05-13 RX ORDER — MORPHINE SULFATE 4 MG/ML
4 INJECTION, SOLUTION INTRAMUSCULAR; INTRAVENOUS ONCE
Status: COMPLETED | OUTPATIENT
Start: 2017-05-13 | End: 2017-05-13

## 2017-05-13 RX ORDER — ONDANSETRON 2 MG/ML
4 INJECTION INTRAMUSCULAR; INTRAVENOUS ONCE
Status: COMPLETED | OUTPATIENT
Start: 2017-05-13 | End: 2017-05-13

## 2017-05-13 RX ADMIN — ONDANSETRON 4 MG: 2 INJECTION INTRAMUSCULAR; INTRAVENOUS at 04:41

## 2017-05-13 RX ADMIN — SODIUM CHLORIDE: 9 INJECTION, SOLUTION INTRAVENOUS at 04:41

## 2017-05-13 RX ADMIN — MORPHINE SULFATE 4 MG: 4 INJECTION INTRAVENOUS at 05:38

## 2017-05-13 ASSESSMENT — LIFESTYLE VARIABLES: DO YOU DRINK ALCOHOL: NO

## 2017-05-13 NOTE — ED NOTES
PIV started. Blood drawn and sent to lab. Pt medicated per MAR. Pt refused morphine at this time.   Pt amb to restroom with stable gait.

## 2017-05-13 NOTE — ED AVS SNAPSHOT
Home Care Instructions                                                                                                                Aleena Diana   MRN: [id number]    Department:  [facility], Emergency Dept   Date of Visit:  5/13/2017            [facility], Emergency Dept    [address] [facility] [address]    Phone:  [phone]      You were seen by     Mundo Joy M.D.      Your Diagnosis Was     Pain of upper abdomen     R10.10       These are the medications you received during your hospitalization from 05/13/2017 0254 to 05/13/2017 0823     Date/Time Order Dose Route Action    05/13/2017 0441 NS infusion   Intravenous New Bag    05/13/2017 0538 morphine (pf) 4 mg/ml injection 4 mg 4 mg Intravenous Given    05/13/2017 0441 ondansetron (ZOFRAN) syringe/vial injection 4 mg 4 mg Intravenous Given      Follow-up Information     1. Follow up with Myra Kurtz M.D.. Schedule an appointment as soon as possible for a visit today.    Specialty:  Family Medicine    Contact information    [address] [place] [address] [phone]        Medication Information     Review all of your home medications and newly ordered medications with your primary doctor and/or pharmacist as soon as possible. Follow medication instructions as directed by your doctor and/or pharmacist.     Please keep your complete medication list with you and share with your physician. Update the information when medications are discontinued, doses are changed, or new medications (including over-the-counter products) are added; and carry medication information at all times in the event of emergency situations.               Medication List      START taking these medications        Instructions    Morning Afternoon Evening Bedtime    lansoprazole 15 MG Cpdr   Commonly known as:  PREVACID        Take 1 Cap by mouth every day for 22 days.   Dose:  15 mg                       sucralfate 1 GM Tabs   Commonly known as:  CARAFATE        Take 1 Tab by mouth 4 Times a Day,Before Meals and at Bedtime.   Dose:  1 g                          ASK your doctor about these medications        Instructions    Morning Afternoon Evening Bedtime    albuterol 108 (90 BASE) MCG/ACT Aers inhalation aerosol        Inhale 2 Puffs by mouth every 6 hours as needed for Shortness of Breath.   Dose:  2 Puff                        levothyroxine 175 MCG Tabs   Commonly known as:  SYNTHROID        Take 1 Tab by mouth Every morning on an empty stomach.   Dose:  175 mcg                        nystatin 054734 UNIT/ML Susp   Commonly known as:  MYCOSTATIN        Take 5 mL by mouth 4 times a day. swish in the mouth and retain for as long as possible (several minutes) before swallowing   Dose:  [id number] Units                        TRULICITY 0.75 MG/0.5ML Sopn   Generic drug:  Dulaglutide        INJECT THE CONTENTS OF 1 SYRINGE SUBCUTANEOUSLY ONCE WEEKLY                        Vitamin D3 15203 UNITS Caps        Take 1 Cap by mouth every 7 days.   Dose:  1 Cap                             Where to Get Your Medications      These medications were sent to .[facility] [address] [place] [address], [place] - [address] [id number]  AT [facility] [address] [place]  [address] [facility] [address]     Phone:  [phone]    - lansoprazole 15 MG Cpdr  - sucralfate 1 GM Tabs            Procedures and tests performed during your visit     CARDIAC MONITORING    CBC WITH DIFFERENTIAL    COMP METABOLIC PANEL    CONSENT FOR CONTRAST INJECTION    CT-ABDOMEN-PELVIS W/O    ESTIMATED GFR    LIPASE    O2 Protocol    POC UA    POC URINE PREGNANCY    SALINE LOCK        Discharge Instructions       Abdominal Pain, Adult  Many things can cause abdominal pain. Usually, abdominal pain is not caused by a disease and will improve without treatment. It can often be observed and treated at home. Your health care provider will do a physical exam  and possibly order blood tests and X-rays to help determine the seriousness of your pain. However, in many cases, more time must pass before a clear cause of the pain can be found. Before that point, your health care provider may not know if you need more testing or further treatment.  HOME CARE INSTRUCTIONS   Monitor your abdominal pain for any changes. The following actions may help to alleviate any discomfort you are experiencing:  · Only take over-the-counter or prescription medicines as directed by your health care provider.  · Do not take laxatives unless directed to do so by your health care provider.  · Try a clear liquid diet (broth, tea, or water) as directed by your health care provider. Slowly move to a bland diet as tolerated.  SEEK MEDICAL CARE IF:  · You have unexplained abdominal pain.  · You have abdominal pain associated with nausea or diarrhea.  · You have pain when you urinate or have a bowel movement.  · You experience abdominal pain that wakes you in the night.  · You have abdominal pain that is worsened or improved by eating food.  · You have abdominal pain that is worsened with eating fatty foods.  · You have a fever.  SEEK IMMEDIATE MEDICAL CARE IF:   · Your pain does not go away within 2 hours.  · You keep throwing up (vomiting).  · Your pain is felt only in portions of the abdomen, such as the right side or the left lower portion of the abdomen.  · You pass bloody or black tarry stools.  MAKE SURE YOU:  · Understand these instructions.    · Will watch your condition.    · Will get help right away if you are not doing well or get worse.       This information is not intended to replace advice given to you by your health care provider. Make sure you discuss any questions you have with your health care provider.     Document Released: 09/27/2006 Document Revised: 01/08/2016 Document Reviewed: 08/27/2014  [employer] Interactive Patient Education ©2016 [employer]    Chronic Diarrhea  Diarrhea  is frequent loose and watery bowel movements. It can cause you to feel weak and dehydrated. Dehydration can cause you to become tired and thirsty and to have a dry mouth, decreased urination, and dark yellow urine. Diarrhea is a sign of another problem, most often an infection that will not last long. In most cases, diarrhea lasts 2-3 days. Diarrhea that lasts longer than 4 weeks is called long-lasting (chronic) diarrhea. It is important to treat your diarrhea as directed by your health care provider to lessen or prevent future episodes of diarrhea.   CAUSES   There are many causes of chronic diarrhea. The following are some possible causes:   · Gastrointestinal infections caused by viruses, bacteria, or parasites.    · Food poisoning or food allergies.    · Certain medicines, such as antibiotics, chemotherapy, and laxatives.    · Artificial sweeteners and fructose.    · Digestive disorders, such as celiac disease and inflammatory bowel diseases.    · Irritable bowel syndrome.  · Some disorders of the pancreas.  · Disorders of the thyroid.  · Reduced blood flow to the intestines.  · Cancer.  Sometimes the cause of chronic diarrhea is unknown.  RISK FACTORS  · Having a severely weakened immune system, such as from HIV or AIDS.    · Taking certain types of cancer-fighting drugs (such as with chemotherapy) or other medicines.    · Having had a recent organ transplant.    · Having a portion of the stomach or small bowel removed.    · Traveling to countries where food and water supplies are often contaminated.    SYMPTOMS   In addition to frequent, loose stools, diarrhea may cause:   · Cramping.    · Abdominal pain.    · Nausea.    · Fever.  · Fatigue.  · Urgent need to use the bathroom.  · Loss of bowel control.  DIAGNOSIS   Your health care provider must take a careful history and perform a physical exam. Tests given are based on your symptoms and history. Tests may include:   · Blood or stool tests. Three or more  stool samples may be examined. Stool cultures may be used to test for bacteria or parasites.    · X-rays.    · A procedure in which a thin tube is inserted into the mouth or rectum (endoscopy). This allows the health care provider to look inside the intestine.    TREATMENT   · Treatment is aimed at correcting the cause of the diarrhea when possible.  · Diarrhea caused by an infection can often be treated with antibiotic medicines.  · Diarrhea not caused by an infection may require you to take long-term medicine or have surgery. Specific treatment should be discussed with your health care provider.  · If the cause cannot be determined, treatment aims to relieve symptoms and prevent dehydration. Serious health problems can occur if you do not maintain proper fluid levels. Treatment may include:  ¨ Taking an oral rehydration solution (ORS).  ¨ Not drinking beverages that contain caffeine (such as tea, coffee, and soft drinks).  ¨ Not drinking alcohol.  ¨ Maintaining well-balanced nutrition to help you recover faster.  HOME CARE INSTRUCTIONS   · Drink enough fluids to keep urine clear or pale yellow. Drink 1 cup (8 oz) of fluid for each diarrhea episode. Avoid fluids that contain simple sugars, fruit juices, whole milk products, and sodas. Hydrate with an ORS. You may purchase the ORS or prepare it at home by mixing the following ingredients together:  ¨  - tsp (1.7-3  mL) table salt.  ¨ ¾ tsp (3 ¾ mL) baking soda.  ¨  tsp (1.7 mL) salt substitute containing potassium chloride.  ¨ 1 tbsp (20 mL) sugar.  ¨ 4.2 c (1 L) of water.    · Certain foods and beverages may increase the speed at which food moves through the gastrointestinal (GI) tract. These foods and beverages should be avoided. They include:  ¨ Caffeinated and alcoholic beverages.  ¨ High-fiber foods, such as raw fruits and vegetables, nuts, seeds, and whole grain breads and cereals.  ¨ Foods and beverages sweetened with sugar alcohols, such as xylitol,  sorbitol, and mannitol.    · Some foods may be well tolerated and may help thicken stool. These include:  ¨ Starchy foods, such as rice, toast, pasta, low-sugar cereal, oatmeal, grits, baked potatoes, crackers, and bagels.  ¨ Bananas.  ¨ Applesauce.  · Add probiotic-rich foods to help increase healthy bacteria in the GI tract. These include yogurt and fermented milk products.  · Wash your hands well after each diarrhea episode.  · Only take over-the-counter or prescription medicines as directed by your health care provider.  · Take a warm bath to relieve any burning or pain from frequent diarrhea episodes.  SEEK MEDICAL CARE IF:   · You are not urinating as often.  · Your urine is a dark color.  · You become very tired or dizzy.  · You have severe pain in the abdomen or rectum.  · Your have blood or pus in your stools.  · Your stools look black and tarry.  SEEK IMMEDIATE MEDICAL CARE IF:   · You are unable to keep fluids down.  · You have persistent vomiting.  · You have blood in your stool.  · Your stools are black and tarry.  · You do not urinate in 6-8 hours, or there is only a small amount of very dark urine.  · You have abdominal pain that increases or localizes.  · You have weakness, dizziness, confusion, or lightheadedness.  · You have a severe headache.  · Your diarrhea gets worse or does not get better.  · You have a fever or persistent symptoms for more than 2-3 days.  · You have a fever and your symptoms suddenly get worse.  MAKE SURE YOU:   · Understand these instructions.  · Will watch your condition.  · Will get help right away if you are not doing well or get worse.     This information is not intended to replace advice given to you by your health care provider. Make sure you discuss any questions you have with your health care provider.     Document Released: 03/09/2005 Document Revised: 12/23/2014 Document Reviewed: 06/12/2014  [employer] Interactive Patient Education ©2016 [employer]  Return if  fever, vomiting or if no better in 12 hours.          Patient Information     Patient Information    Following emergency treatment: all patient requiring follow-up care must return either to a private physician or a clinic if your condition worsens before you are able to obtain further medical attention, please return to the emergency room.     Billing Information    At [facility], we work to make the billing process streamlined for our patients.  Our Representatives are here to answer any questions you may have regarding your hospital bill.  If you have insurance coverage and have supplied your insurance information to us, we will submit a claim to your insurer on your behalf.  Should you have any questions regarding your bill, we can be reached online or by phone as follows:  Online: You are able pay your bills online or live chat with our representatives about any billing questions you may have. We are here to help Monday - Friday from 8:00am to 7:30pm and 9:00am - 12:00pm on Saturdays.  Please visit [email or web address]  for more information.   Phone:  [phone] or [phone]    Please note that your emergency physician, surgeon, pathologist, radiologist, anesthesiologist, and other specialists are not employed by [facility] and will therefore bill separately for their services.  Please contact them directly for any questions concerning their bills at the numbers below:     Emergency Physician Services:  [phone]  [facility]:  [phone]  [facility]:  [phone]  [facility]:  [phone]    1. Your final bill may vary from the amount quoted upon discharge if all procedures are not complete at that time, or if your doctor has additional procedures of which we are not aware. You will receive an additional bill if you return to the Emergency Department at [facility] for suture removal regardless of the facility of  which the sutures were placed.     2. Please arrange for settlement of this account at the emergency registration.    3. All self-pay accounts are due in full at the time of treatment.  If you are unable to meet this obligation then payment is expected within 4-5 days.     4. If you have had radiology studies (CT, X-ray, Ultrasound, MRI), you have received a preliminary result during your emergency department visit. Please contact the radiology department [phone] to receive a copy of your final result. Please discuss the Final result with your primary physician or with the follow up physician provided.     Crisis Hotline:  [place] Crisis Hotline:  6-644-XHDGNSX or 1-162.546.5809  [place] Crisis Hotline:    1-160.736.1000 or 360-852-4670         ED Discharge Follow Up Questions    1. In order to provide you with very good care, we would like to follow up with a phone call in the next few days.  May we have your permission to contact you?     YES /  NO    2. What is the best phone number to call you? (       )_____-__________    3. What is the best time to call you?      Morning  /  Afternoon  /  Evening                   Patient Signature:  ____________________________________________________________    Date:  ____________________________________________________________      Your appointments     May 22, 2017  3:40 PM   MA [id number] with [facility] MG 2   [facility] ([address])    [address] [place] [address] [place] [address] [phone]           No deodorant, powder, perfume or lotion under the arm or breast area.            Oct 19, 2017  4:00 PM   Established Patient with Myra Kurtz M.D.   [facility] (--)    [address]  [facility] [id number] [phone]           You will be receiving a confirmation call a few days before your appointment from our automated call confirmation system.

## 2017-05-13 NOTE — ED AVS SNAPSHOT
5/13/2017    Aleena Diana  [address] [place] [address]    Dear Aleena:    [facility] wants to ensure your discharge home is safe and you or your loved ones have had all of your questions answered regarding your care after you leave the hospital.    Below is a list of resources and contact information should you have any questions regarding your hospital stay, follow-up instructions, or active medical symptoms.    Questions or Concerns Regarding… Contact   Medical Questions Related to Your Discharge  (7 days a week, 8am-5pm) Contact a Nurse Care Coordinator   [phone]   Medical Questions Not Related to Your Discharge  (24 hours a day / 7 days a week)  Contact the Nurse Health Line   [phone]    Medications or Discharge Instructions Refer to your discharge packet   or contact your [facility] Primary Care Provider   [phone]   Follow-up Appointment(s) Schedule your appointment via LiveQoS   or contact Scheduling [phone]   Billing Review your statement via LiveQoS  or contact Billing [phone]   Medical Records Review your records via LiveQoS   or contact Medical Records [phone]     You may receive a telephone call within two days of discharge. This call is to make certain you understand your discharge instructions and have the opportunity to have any questions answered. You can also easily access your medical information, test results and upcoming appointments via the LiveQoS free online health management tool. You can learn more and sign up at [email or web address]. For assistance setting up your LiveQoS account, please call [phone].    Once again, we want to ensure your discharge home is safe and that you have a clear understanding of any next steps in your care. If you have any questions or concerns, please do not hesitate to contact us, we are here for you. Thank you for choosing [facility] for your healthcare needs.    Sincerely,    Your [facility] Healthcare Team

## 2017-05-13 NOTE — ED AVS SNAPSHOT
Kigo Access Code: Activation code not generated  Current Kigo Status: Active    [employer]  A secure, online tool to manage your health information     [employer]’s Kigo® is a secure, online tool that connects you to your personalized health information from the privacy of your home -- day or night - making it very easy for you to manage your healthcare. Once the activation process is completed, you can even access your medical information using the Kigo sarah, which is available for free in the Apple Sarah store or Google Play store.     Kigo provides the following levels of access (as shown below):   My Chart Features   [facility] Primary Care Doctor [facility]  Specialists [facility]  Urgent  Care Non-[facility]  Primary Care  Doctor   Email your healthcare team securely and privately 24/7 X X X X   Manage appointments: schedule your next appointment; view details of past/upcoming appointments X      Request prescription refills. X      View recent personal medical records, including lab and immunizations X X X X   View health record, including health history, allergies, medications X X X X   Read reports about your outpatient visits, procedures, consult and ER notes X X X X   See your discharge summary, which is a recap of your hospital and/or ER visit that includes your diagnosis, lab results, and care plan. X X       How to register for Kigo:  1. Go to  [email or web address].  2. Click on the Sign Up Now box, which takes you to the New Member Sign Up page. You will need to provide the following information:  a. Enter your Kigo Access Code exactly as it appears at the top of this page. (You will not need to use this code after you’ve completed the sign-up process. If you do not sign up before the expiration date, you must request a new code.)   b. Enter your date of birth.   c. Enter your home email address.   d. Click Submit, and follow the next screen’s instructions.  3. Create a Kigo ID. This will  be your Blekko login ID and cannot be changed, so think of one that is secure and easy to remember.  4. Create a Blekko password. You can change your password at any time.  5. Enter your Password Reset Question and Answer. This can be used at a later time if you forget your password.   6. Enter your e-mail address. This allows you to receive e-mail notifications when new information is available in Blekko.  7. Click Sign Up. You can now view your health information.    For assistance activating your Blekko account, call [phone]

## 2017-05-13 NOTE — ED PROVIDER NOTES
ED Provider Note    CHIEF COMPLAINT  Chief Complaint   Patient presents with   • Abdominal Pain     since yesterday 0600       HPI  Aleena Diana is a 42 y.o. female who presents with upper abdominal pain, for the last 24 hours, nausea without vomiting, diarrhea also for the last 24 hours. Pain is somewhat better now. Food no change in the pain. Pain is similar to what she has had with gallbladder in the past however has had a cholecystectomy. Describes the pain is moderate and all gradual nonradiating with nausea with diarrhea no other modifiers. States she is not pregnant.. Absolutely no chest pain    REVIEW OF SYSTEMS  See HPI for further details. History of diabetes thyroid insufficiency cholecystectomy appendectomy fatty liver .HANDPROS  All other systems are negative.     PAST MEDICAL HISTORY  Past Medical History   Diagnosis Date   • Thyroid disease    • Diabetes (CMS-HCC)        FAMILY HISTORY  Family History   Problem Relation Age of Onset   • Hypertension Mother    • Hypertension Father        SOCIAL HISTORY she is a former smoker  Social History     Social History   • Marital Status: Unknown     Spouse Name: N/A   • Number of Children: N/A   • Years of Education: N/A     Social History Main Topics   • Smoking status: Former Smoker     Quit date: 05/06/2013   • Smokeless tobacco: Never Used   • Alcohol Use: Yes      Comment: rare   • Drug Use: No   • Sexual Activity:     Partners: Male     Birth Control/ Protection: IUD     Other Topics Concern   • None     Social History Narrative       SURGICAL HISTORY  Past Surgical History   Procedure Laterality Date   • Cholecystectomy     • Appendectomy     • Primary c section         CURRENT MEDICATIONS  Home Medications     Reviewed by Yovanny Ojeda R.N. (Registered Nurse) on 05/13/17 at 1035  Med List Status: Partial    Medication Last Dose Status    albuterol 108 (90 BASE) MCG/ACT Aero Soln inhalation aerosol  Active    Cholecalciferol (VITAMIN D3)  "70213 UNITS Cap  Active    levothyroxine (SYNTHROID) 175 MCG Tab  Active    nystatin (MYCOSTATIN) 254182 UNIT/ML Suspension  Active    TRULICITY 0.75 MG/0.5ML Solution Pen-injector  Active                ALLERGIES  Allergies   Allergen Reactions   • Codeine    • Influenza Vaccines Swelling     Arm became swollen and red   • Other Food      Nuts   • Penicillins    • Tape        PHYSICAL EXAM  VITAL SIGNS: /83 mmHg  Pulse 85  Temp(Src) 36.3 °C (97.3 °F)  Resp 16  Ht 1.676 m (5' 6\")  Wt 88 kg (194 lb 0.1 oz)  BMI 31.33 kg/m2  SpO2 98%  Constitutional: Well developed, Well nourished, No acute distress, Non-toxic appearance.   HENT: Normocephalic, Atraumatic, Bilateral external ears normal, Oropharynx moist, No oral exudates, Nose normal.   Eyes: PERRL, EOMI, Conjunctiva normal, No discharge.   Neck: Normal range of motion, No tenderness, Supple, No stridor.   Lymphatic: No lymphadenopathy noted.   Cardiovascular: Normal heart rate, Normal rhythm, No murmurs, No rubs, No gallops.   Thorax & Lungs: Normal breath sounds, No respiratory distress, No wheezing, No chest tenderness.   Abdomen: Minimal tenderness upper abdomen, no guarding no rigidity and the abdomen is soft.  No masses, No pulsatile masses.  Skin: Warm, Dry, No erythema, No rash.   Back: No tenderness, No CVA tenderness.   Extremities: Intact distal pulses, No edema, No tenderness, No cyanosis, No clubbing.   Musculoskeletal: Good range of motion in all major joints. No tenderness to palpation or major deformities noted.   Neurologic: Alert & oriented x 3, Normal motor function, Normal sensory function, No focal deficits noted.   Psychiatric: Affect normal, Judgment normal, Mood normal.       RADIOLOGY/PROCEDURES  CT-ABDOMEN-PELVIS W/O   Final Result         1. No evidence of acute abnormality or inflammatory change.  The study is however limited due to nonuse of intravenous contrast.            COURSE & MEDICAL DECISION MAKING  Pertinent Labs & " Imaging studies reviewed. (See chart for details) urinalysis, normal, electrolytes, unremarkable renal function normal liver function unremarkable. White count hematocrit and platelets are normal    She has been having abdominal pain and cramping for the last 24 hours diarrhea and nausea but no vomiting. Given IV normal saline and morphine Zofran  States a workup on her abdomen has been done. CAT scan demonstrates no acute abnormalities. Labs demonstrate no acute abnormalities. By 8 AM, her pain is gone. It I will send her home with Prevacid Carafate.This patient understands that abdominal pain may be very elusive. At this point there is no evidence of an acute abdominal emergency. However if the pain returns or is no better in 12 hours or any vomiting they should return to the emergency room immediately. Just because the lab and x-rays are normal does not rule out a severe abdominal emergency    FINAL IMPRESSION  1.   1. Pain of upper abdomen    2. Nausea    3. Diarrhea, unspecified type        2.   3.     Disposition  Discharge instructions are understood. This patient is to return if fever vomiting or no better in 12 hours. Follow up with the [facility] clinic or private physician. Information sheets on upper abdominal pain nausea diarrhea  Electronically signed by: Mundo Joy, 5/13/2017 4:21 AM

## 2017-05-13 NOTE — ED NOTES
"Chief Complaint   Patient presents with   • Abdominal Pain     since yesterday 0600       - N/V.  Pt hunched over, appears to be in mild distress. Watery diarrhea.      /83 mmHg  Pulse 85  Temp(Src) 36.3 °C (97.3 °F)  Resp 16  Ht 1.676 m (5' 6\")  Wt 88 kg (194 lb 0.1 oz)  BMI 31.33 kg/m2  SpO2 98%      Pt Informed regarding triage process and verbalized understanding to inform triage tech or RN for any changes in condition.  Placed in lobby.    "

## 2017-05-13 NOTE — DISCHARGE INSTRUCTIONS
Abdominal Pain, Adult  Many things can cause abdominal pain. Usually, abdominal pain is not caused by a disease and will improve without treatment. It can often be observed and treated at home. Your health care provider will do a physical exam and possibly order blood tests and X-rays to help determine the seriousness of your pain. However, in many cases, more time must pass before a clear cause of the pain can be found. Before that point, your health care provider may not know if you need more testing or further treatment.  HOME CARE INSTRUCTIONS   Monitor your abdominal pain for any changes. The following actions may help to alleviate any discomfort you are experiencing:  · Only take over-the-counter or prescription medicines as directed by your health care provider.  · Do not take laxatives unless directed to do so by your health care provider.  · Try a clear liquid diet (broth, tea, or water) as directed by your health care provider. Slowly move to a bland diet as tolerated.  SEEK MEDICAL CARE IF:  · You have unexplained abdominal pain.  · You have abdominal pain associated with nausea or diarrhea.  · You have pain when you urinate or have a bowel movement.  · You experience abdominal pain that wakes you in the night.  · You have abdominal pain that is worsened or improved by eating food.  · You have abdominal pain that is worsened with eating fatty foods.  · You have a fever.  SEEK IMMEDIATE MEDICAL CARE IF:   · Your pain does not go away within 2 hours.  · You keep throwing up (vomiting).  · Your pain is felt only in portions of the abdomen, such as the right side or the left lower portion of the abdomen.  · You pass bloody or black tarry stools.  MAKE SURE YOU:  · Understand these instructions.    · Will watch your condition.    · Will get help right away if you are not doing well or get worse.       This information is not intended to replace advice given to you by your health care provider. Make sure you  discuss any questions you have with your health care provider.     Document Released: 09/27/2006 Document Revised: 01/08/2016 Document Reviewed: 08/27/2014  Related Content Database ([employer]) Interactive Patient Education ©2016 [employer]    Chronic Diarrhea  Diarrhea is frequent loose and watery bowel movements. It can cause you to feel weak and dehydrated. Dehydration can cause you to become tired and thirsty and to have a dry mouth, decreased urination, and dark yellow urine. Diarrhea is a sign of another problem, most often an infection that will not last long. In most cases, diarrhea lasts 2-3 days. Diarrhea that lasts longer than 4 weeks is called long-lasting (chronic) diarrhea. It is important to treat your diarrhea as directed by your health care provider to lessen or prevent future episodes of diarrhea.   CAUSES   There are many causes of chronic diarrhea. The following are some possible causes:   · Gastrointestinal infections caused by viruses, bacteria, or parasites.    · Food poisoning or food allergies.    · Certain medicines, such as antibiotics, chemotherapy, and laxatives.    · Artificial sweeteners and fructose.    · Digestive disorders, such as celiac disease and inflammatory bowel diseases.    · Irritable bowel syndrome.  · Some disorders of the pancreas.  · Disorders of the thyroid.  · Reduced blood flow to the intestines.  · Cancer.  Sometimes the cause of chronic diarrhea is unknown.  RISK FACTORS  · Having a severely weakened immune system, such as from HIV or AIDS.    · Taking certain types of cancer-fighting drugs (such as with chemotherapy) or other medicines.    · Having had a recent organ transplant.    · Having a portion of the stomach or small bowel removed.    · Traveling to countries where food and water supplies are often contaminated.    SYMPTOMS   In addition to frequent, loose stools, diarrhea may cause:   · Cramping.    · Abdominal pain.    · Nausea.    · Fever.  · Fatigue.  · Urgent need to use the  bathroom.  · Loss of bowel control.  DIAGNOSIS   Your health care provider must take a careful history and perform a physical exam. Tests given are based on your symptoms and history. Tests may include:   · Blood or stool tests. Three or more stool samples may be examined. Stool cultures may be used to test for bacteria or parasites.    · X-rays.    · A procedure in which a thin tube is inserted into the mouth or rectum (endoscopy). This allows the health care provider to look inside the intestine.    TREATMENT   · Treatment is aimed at correcting the cause of the diarrhea when possible.  · Diarrhea caused by an infection can often be treated with antibiotic medicines.  · Diarrhea not caused by an infection may require you to take long-term medicine or have surgery. Specific treatment should be discussed with your health care provider.  · If the cause cannot be determined, treatment aims to relieve symptoms and prevent dehydration. Serious health problems can occur if you do not maintain proper fluid levels. Treatment may include:  ¨ Taking an oral rehydration solution (ORS).  ¨ Not drinking beverages that contain caffeine (such as tea, coffee, and soft drinks).  ¨ Not drinking alcohol.  ¨ Maintaining well-balanced nutrition to help you recover faster.  HOME CARE INSTRUCTIONS   · Drink enough fluids to keep urine clear or pale yellow. Drink 1 cup (8 oz) of fluid for each diarrhea episode. Avoid fluids that contain simple sugars, fruit juices, whole milk products, and sodas. Hydrate with an ORS. You may purchase the ORS or prepare it at home by mixing the following ingredients together:  ¨  - tsp (1.7-3  mL) table salt.  ¨ ¾ tsp (3 ¾ mL) baking soda.  ¨  tsp (1.7 mL) salt substitute containing potassium chloride.  ¨ 1 tbsp (20 mL) sugar.  ¨ 4.2 c (1 L) of water.    · Certain foods and beverages may increase the speed at which food moves through the gastrointestinal (GI) tract. These foods and beverages should be  avoided. They include:  ¨ Caffeinated and alcoholic beverages.  ¨ High-fiber foods, such as raw fruits and vegetables, nuts, seeds, and whole grain breads and cereals.  ¨ Foods and beverages sweetened with sugar alcohols, such as xylitol, sorbitol, and mannitol.    · Some foods may be well tolerated and may help thicken stool. These include:  ¨ Starchy foods, such as rice, toast, pasta, low-sugar cereal, oatmeal, grits, baked potatoes, crackers, and bagels.  ¨ Bananas.  ¨ Applesauce.  · Add probiotic-rich foods to help increase healthy bacteria in the GI tract. These include yogurt and fermented milk products.  · Wash your hands well after each diarrhea episode.  · Only take over-the-counter or prescription medicines as directed by your health care provider.  · Take a warm bath to relieve any burning or pain from frequent diarrhea episodes.  SEEK MEDICAL CARE IF:   · You are not urinating as often.  · Your urine is a dark color.  · You become very tired or dizzy.  · You have severe pain in the abdomen or rectum.  · Your have blood or pus in your stools.  · Your stools look black and tarry.  SEEK IMMEDIATE MEDICAL CARE IF:   · You are unable to keep fluids down.  · You have persistent vomiting.  · You have blood in your stool.  · Your stools are black and tarry.  · You do not urinate in 6-8 hours, or there is only a small amount of very dark urine.  · You have abdominal pain that increases or localizes.  · You have weakness, dizziness, confusion, or lightheadedness.  · You have a severe headache.  · Your diarrhea gets worse or does not get better.  · You have a fever or persistent symptoms for more than 2-3 days.  · You have a fever and your symptoms suddenly get worse.  MAKE SURE YOU:   · Understand these instructions.  · Will watch your condition.  · Will get help right away if you are not doing well or get worse.     This information is not intended to replace advice given to you by your health care provider. Make  sure you discuss any questions you have with your health care provider.     Document Released: 03/09/2005 Document Revised: 12/23/2014 Document Reviewed: 06/12/2014  [employer] Interactive Patient Education ©2016 [employer]  Return if fever, vomiting or if no better in 12 hours.

## 2017-05-13 NOTE — ED NOTES
Pt stating she thinks she is allergic to contrast. ERP notified. CT to be without contrast. CT notified.   Pt now with emesis. Requesting ativan.

## 2017-05-13 NOTE — ED NOTES
"Pt to room 9. Agree with triage note. Pt took PO zofran at home without relief. abd pain is epigastric. Reports it's a \"burning\" pain. Gallstones in the past but \"this is different\".   ERP at bedside.   "

## 2017-05-22 ENCOUNTER — HOSPITAL ENCOUNTER (OUTPATIENT)
Dept: RADIOLOGY | Facility: MEDICAL CENTER | Age: 42
End: 2017-05-22
Attending: FAMILY MEDICINE
Payer: COMMERCIAL

## 2017-05-22 PROCEDURE — G0202 SCR MAMMO BI INCL CAD: HCPCS

## 2017-05-30 ENCOUNTER — APPOINTMENT (OUTPATIENT)
Dept: MEDICAL GROUP | Facility: LAB | Age: 42
End: 2017-05-30
Payer: COMMERCIAL

## 2017-07-10 ENCOUNTER — OCCUPATIONAL MEDICINE (OUTPATIENT)
Dept: URGENT CARE | Facility: PHYSICIAN GROUP | Age: 42
End: 2017-07-10

## 2017-07-10 VITALS
HEIGHT: 67 IN | HEART RATE: 83 BPM | BODY MASS INDEX: 32.18 KG/M2 | WEIGHT: 205 LBS | OXYGEN SATURATION: 96 % | RESPIRATION RATE: 20 BRPM | DIASTOLIC BLOOD PRESSURE: 70 MMHG | SYSTOLIC BLOOD PRESSURE: 110 MMHG | TEMPERATURE: 97.6 F

## 2017-07-10 DIAGNOSIS — Z02.1 PRE-EMPLOYMENT DRUG SCREENING: ICD-10-CM

## 2017-07-10 DIAGNOSIS — Z02.1 PHYSICAL EXAM, PRE-EMPLOYMENT: ICD-10-CM

## 2017-07-10 LAB
AMP AMPHETAMINE: NORMAL
COC COCAINE: NORMAL
INT CON NEG: NORMAL
INT CON POS: NORMAL
MET METHAMPHETAMINES: NORMAL
OPI OPIATES: NORMAL
PCP PHENCYCLIDINE: NORMAL
POC DRUG COMMENT 753798-OCCUPATIONAL HEALTH: NEGATIVE
THC: NORMAL

## 2017-07-10 PROCEDURE — 8915 PR COMPREHENSIVE PHYSICAL: Performed by: PHYSICIAN ASSISTANT

## 2017-07-10 PROCEDURE — 80305 DRUG TEST PRSMV DIR OPT OBS: CPT | Performed by: PHYSICIAN ASSISTANT

## 2017-07-10 ASSESSMENT — VISUAL ACUITY
OS_CC: 20/20
OD_CC: 20/20

## 2017-07-10 NOTE — MR AVS SNAPSHOT
"Aleena Diana   7/10/2017 3:45 PM   Occupational Medicine   MRN: [id number]    Department:  [facility]   Dept Phone:  [phone]    Description:  Female : 1975   Provider:  Yady Phan PA-C           Reason for Visit     Employment Physical [employer]      Allergies as of 7/10/2017     Allergen Noted Reactions    Bee 07/10/2017       Codeine 2016       Influenza Vaccines 10/06/2016   Swelling    Arm became swollen and red    Ivp [Iodine] 07/10/2017       Other Food 2016       Nuts    Penicillins 2016       Tape 2016         You were diagnosed with     Physical exam, pre-employment   [670020]       Pre-employment drug screening   [992761]         Vital Signs     Blood Pressure Pulse Temperature Respirations Height Weight    110/70 mmHg 83 36.4 °C (97.6 °F) 20 1.702 m (5' 7\") 92.987 kg (205 lb)    Body Mass Index Oxygen Saturation Smoking Status             32.10 kg/m2 96% Former Smoker         Basic Information     Date Of Birth Sex Race Ethnicity Preferred Language    1975 Female White Non- English      Your appointments     Oct 19, 2017  4:00 PM   Established Patient with Myra Kurtz M.D.   [facility] (--)    [address] [place] [address] [id number] [phone]           You will be receiving a confirmation call a few days before your appointment from our automated call confirmation system.              Problem List              ICD-10-CM Priority Class Noted - Resolved    Diabetes type 2, controlled (CMS-HCC) E11.9   Unknown - Present    Hypothyroidism due to Hashimoto's thyroiditis E03.8, E06.3   10/6/2016 - Present    Obesity (BMI 30-39.9) E66.9   10/6/2016 - Present    Seasonal allergic rhinitis J30.2   10/6/2016 - Present    Erythema of lower extremity L53.9   10/6/2016 - Present    Mild intermittent asthma without complication J45.20   10/6/2016 - Present    IUD (intrauterine " device) in place Z97.5   1/17/2017 - Present    Chronic fatigue R53.82   1/17/2017 - Present    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   1/17/2017 - Present    Vitamin D deficiency E55.9   1/20/2017 - Present    Tonsillith J35.8   4/19/2017 - Present    Elevated LFTs R94.5   4/20/2017 - Present    Fatty liver K76.0   4/21/2017 - Present      Health Maintenance        Date Due Completion Dates    IMM HEP B VACCINE (1 of 3 - Primary Series) 1975 ---    FASTING LIPID PROFILE 3/23/1995 ---    URINE ACR / MICROALBUMIN 3/23/1995 ---    IMM DTaP/Tdap/Td Vaccine (1 - Tdap) 3/23/1996 ---    IMM PNEUMOCOCCAL 19-64 (ADULT) MEDIUM RISK SERIES (1 of 1 - PPSV23) 3/23/1996 ---    RETINAL SCREENING 5/4/2017 5/4/2016    IMM INFLUENZA (1) 9/1/2017 ---    A1C SCREENING 9/22/2017 3/22/2017    DIABETES MONOFILAMENT / LE EXAM 4/19/2018 4/19/2017, 4/19/2017    SERUM CREATININE 5/13/2018 5/13/2017, 3/22/2017    PAP SMEAR 1/25/2019 1/25/2016    MAMMOGRAM 5/22/2019 5/22/2017            Results     POCT 6 Panel Urine Drug Screen      Component    AMPHETAMINE    POC THC    COCAINE    OPIATES    PHENCYCLIDINE    METHAMPHETAMINES    POC Urine Drug Screen Comment    negative    Internal Control Positive    Valid    Internal Control Negative    Valid                        Current Immunizations     No immunizations on file.      Below and/or attached are the medications your provider expects you to take. Review all of your home medications and newly ordered medications with your provider and/or pharmacist. Follow medication instructions as directed by your provider and/or pharmacist. Please keep your medication list with you and share with your provider. Update the information when medications are discontinued, doses are changed, or new medications (including over-the-counter products) are added; and carry medication information at all times in the event of emergency situations     Allergies:  BEE - (reactions not documented)      CODEINE - (reactions not documented)     INFLUENZA VACCINES - Swelling     IVP - (reactions not documented)     OTHER FOOD - (reactions not documented)     PENICILLINS - (reactions not documented)     TAPE - (reactions not documented)               Medications  Valid as of: July 10, 2017 -  5:34 PM    Generic Name Brand Name Tablet Size Instructions for use    Albuterol Sulfate (Aero Soln) albuterol 108 (90 BASE) MCG/ACT Inhale 2 Puffs by mouth every 6 hours as needed for Shortness of Breath.        Cholecalciferol (Cap) Vitamin D3 43583 UNITS Take 1 Cap by mouth every 7 days.        Dulaglutide (Solution Pen-injector) TRULICITY 0.75 MG/0.5ML INJECT THE CONTENTS OF 1 SYRINGE SUBCUTANEOUSLY ONCE WEEKLY        Levothyroxine Sodium (Tab) SYNTHROID 175 MCG Take 1 Tab by mouth Every morning on an empty stomach.        Nystatin (Suspension) MYCOSTATIN 133851 UNIT/ML Take 5 mL by mouth 4 times a day. swish in the mouth and retain for as long as possible (several minutes) before swallowing        Sucralfate (Tab) CARAFATE 1 GM Take 1 Tab by mouth 4 Times a Day,Before Meals and at Bedtime.        .                 Medicines prescribed today were sent to:     [facility] [address] [place] [address] [address] [facility] [address] [place] [address] [address] [place] [facility] [address]    Phone: [phone] Fax: [phone]    Open 24 Hours?: No      Medication refill instructions:       If your prescription bottle indicates you have medication refills left, it is not necessary to call your provider’s office. Please contact your pharmacy and they will refill your medication.    If your prescription bottle indicates you do not have any refills left, you may request refills at any time through one of the following ways: The online [employer] system (except Urgent Care), by calling your provider’s office, or by asking your pharmacy to contact your provider’s office with a refill request. Medication refills are  processed only during regular business hours and may not be available until the next business day. Your provider may request additional information or to have a follow-up visit with you prior to refilling your medication.   *Please Note: Medication refills are assigned a new Rx number when refilled electronically. Your pharmacy may indicate that no refills were authorized even though a new prescription for the same medication is available at the pharmacy. Please request the medicine by name with the pharmacy before contacting your provider for a refill.           [employer] Access Code: Activation code not generated  Current [employer] Status: Active

## 2017-07-10 NOTE — PROGRESS NOTES
Patient is here for a preemployment physical. Patient denies any issues at this time.  Patient is cleared for work. 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

## 2017-09-18 ENCOUNTER — RX ONLY (OUTPATIENT)
Age: 42
Setting detail: RX ONLY
End: 2017-09-18

## 2017-09-26 ENCOUNTER — OFFICE VISIT (OUTPATIENT)
Dept: URGENT CARE | Facility: PHYSICIAN GROUP | Age: 42
End: 2017-09-26
Payer: COMMERCIAL

## 2017-09-26 VITALS
TEMPERATURE: 99.2 F | SYSTOLIC BLOOD PRESSURE: 110 MMHG | HEIGHT: 66 IN | OXYGEN SATURATION: 98 % | DIASTOLIC BLOOD PRESSURE: 80 MMHG | BODY MASS INDEX: 30.82 KG/M2 | HEART RATE: 95 BPM | RESPIRATION RATE: 16 BRPM | WEIGHT: 191.8 LBS

## 2017-09-26 DIAGNOSIS — R11.2 NAUSEA AND VOMITING, INTRACTABILITY OF VOMITING NOT SPECIFIED, UNSPECIFIED VOMITING TYPE: ICD-10-CM

## 2017-09-26 PROCEDURE — 99214 OFFICE O/P EST MOD 30 MIN: CPT | Performed by: PHYSICIAN ASSISTANT

## 2017-09-26 RX ORDER — ONDANSETRON 4 MG/1
4 TABLET, FILM COATED ORAL EVERY 4 HOURS PRN
Qty: 20 TAB | Refills: 0 | Status: SHIPPED | OUTPATIENT
Start: 2017-09-26 | End: 2017-11-03

## 2017-09-26 RX ORDER — MECOBALAMIN 5000 MCG
15 TABLET,DISINTEGRATING ORAL DAILY
Qty: 30 CAP | Refills: 0 | Status: SHIPPED | OUTPATIENT
Start: 2017-09-26 | End: 2017-10-26

## 2017-09-26 ASSESSMENT — PAIN SCALES - GENERAL: PAINLEVEL: NO PAIN

## 2017-09-26 NOTE — PROGRESS NOTES
Chief Complaint   Patient presents with   • Emesis       HISTORY OF PRESENT ILLNESS: Patient is a 42 y.o. female who presents today becauseShe was vomiting and some mild nausea. She reports that this happened 3 times over the last 3-4 weeks. She has an episode of vomiting that lasted about 30 minutes with 4-6 episodes of vomiting. She denies any fevers, chills, only has nausea prior to vomiting. She had some Zofran from a previous prescription, which seemed to help. She reports that she has recently been prescribed Victoza for her diabetes and she associates it with taking the medication. Her blood sugars have been in the low 100s and she denies any urinary symptoms, denies any diarrhea. She does have heartburn. She was on Prevacid in the past, which did seem to help her symptoms at that time. 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6 mg as instructed every day.     • levothyroxine (SYNTHROID) 175 MCG Tab Take 1 Tab by mouth Every morning on an empty stomach. 30 Tab    • sucralfate (CARAFATE) 1 GM Tab Take 1 Tab by mouth 4 Times a Day,Before Meals and at  "Bedtime. 44 Tab 4   • nystatin (MYCOSTATIN) 885948 UNIT/ML Suspension Take 5 mL by mouth 4 times a day. swish in the mouth and retain for as long as possible (several minutes) before swallowing 100 mL 1   • Cholecalciferol (VITAMIN D3) 75899 UNITS Cap Take 1 Cap by mouth every 7 days. 16 Cap 0   • TRULICITY 0.75 MG/0.5ML Solution Pen-injector INJECT THE CONTENTS OF 1 SYRINGE SUBCUTANEOUSLY ONCE WEEKLY  12   • albuterol 108 (90 BASE) MCG/ACT Aero Soln inhalation aerosol Inhale 2 Puffs by mouth every 6 hours as needed for Shortness of Breath. 8.5 g 3     No current [facility]-ordered facility-administered medications on file.        Past Medical History:   Diagnosis Date   • Diabetes (CMS-HCC)    • Thyroid disease        Social History   Substance Use Topics   • Smoking status: Former Smoker     Quit date: 5/6/2013   • Smokeless tobacco: Never Used   • Alcohol use Yes      Comment: rare       Family Status   Relation Status   • Mother Alive   • Father Alive     Family History   Problem Relation Age of Onset   • Hypertension Mother    • Hypertension Father        ROS:  Review of Systems   Constitutional: Negative for fever, chills, weight loss and malaise/fatigue.   HENT: Negative for ear pain, nosebleeds, congestion, sore throat and neck pain.    Eyes: Negative for blurred vision.   Respiratory: Negative for cough, sputum production, shortness of breath and wheezing.    Cardiovascular: Negative for chest pain, palpitations, orthopnea and leg swelling.   Gastrointestinal:Positive for heartburn, nausea, vomiting and denies abdominal pain.   Genitourinary: Negative for dysuria, urgency and frequency.     Exam:  Blood pressure 110/80, pulse 95, temperature 37.3 °C (99.2 °F), resp. rate 16, height 1.676 m (5' 6\"), weight 87 kg (191 lb 12.8 oz), SpO2 98 %.  General: Obese female in NAD  Head:Normocephalic, atraumatic  Eyes: PERRLA, EOM within normal limits, no conjunctival injection, no scleral icterus, visual fields and acuity " grossly intact.  Nose: Symmetrical without tenderness, no discharge.  Mouth: reasonable hygiene, no erythema exudates or tonsillar enlargement.  Neck: no masses, range of motion within normal limits, no tracheal deviation. No obvious thyroid enlargement.  Pulmonary: chest is symmetrical with respiration, no wheezes, crackles, or rhonchi.  Cardiovascular: regular rate and rhythm without murmurs, rubs, or gallops.  Abdomen: Nondistended, bowel tones in all 4 quadrants, soft, no tenderness to palpation, no organomegaly, no rebound, referred rebound tenderness, no Lucero's or McBurney's point tenderness.  Extremities: no clubbing, cyanosis, or edema.    Please note that this dictation was created using voice recognition software. I have made every reasonable attempt to correct obvious errors, but I expect that there are errors of grammar and possibly content that I did not discover before finalizing the note.    Assessment/Plan:  1. Nausea and vomiting, intractability of vomiting not specified, unspecified vomiting type       Discussed her diabetes labs, patient has an outside provider who checks her labs regularly. Declines further testing at this time    Discussed bland diet, avoidance of caffeine, alcohol, tobacco, spicy foods    Followup with primary care in the next 7-10 days if not significantly improving, return to the urgent care or go to the emergency room sooner for any worsening of symptoms.

## 2017-11-03 ENCOUNTER — OFFICE VISIT (OUTPATIENT)
Dept: MEDICAL GROUP | Facility: LAB | Age: 42
End: 2017-11-03
Payer: COMMERCIAL

## 2017-11-03 VITALS
HEART RATE: 76 BPM | OXYGEN SATURATION: 95 % | TEMPERATURE: 98 F | WEIGHT: 191 LBS | BODY MASS INDEX: 30.7 KG/M2 | DIASTOLIC BLOOD PRESSURE: 70 MMHG | SYSTOLIC BLOOD PRESSURE: 112 MMHG | HEIGHT: 66 IN | RESPIRATION RATE: 12 BRPM

## 2017-11-03 DIAGNOSIS — R79.89 ELEVATED LFTS: ICD-10-CM

## 2017-11-03 DIAGNOSIS — E66.9 OBESITY (BMI 30-39.9): ICD-10-CM

## 2017-11-03 DIAGNOSIS — E78.5 DYSLIPIDEMIA: ICD-10-CM

## 2017-11-03 DIAGNOSIS — K30 INDIGESTION: ICD-10-CM

## 2017-11-03 DIAGNOSIS — E11.9 CONTROLLED TYPE 2 DIABETES MELLITUS WITHOUT COMPLICATION, WITHOUT LONG-TERM CURRENT USE OF INSULIN (HCC): ICD-10-CM

## 2017-11-03 PROCEDURE — 99214 OFFICE O/P EST MOD 30 MIN: CPT | Performed by: FAMILY MEDICINE

## 2017-11-03 RX ORDER — PANTOPRAZOLE SODIUM 40 MG/1
40 TABLET, DELAYED RELEASE ORAL DAILY
COMMUNITY
End: 2018-09-17

## 2017-11-03 ASSESSMENT — PATIENT HEALTH QUESTIONNAIRE - PHQ9: CLINICAL INTERPRETATION OF PHQ2 SCORE: 0

## 2017-11-03 NOTE — PROGRESS NOTES
Subjective:   Aleena Diana is a 42 y.o. female here today for   Chief Complaint   Patient presents with   • Follow-Up       1. Controlled type 2 diabetes mellitus without complication, without long-term current use of insulin (CMS-[facility])   This is chronic. This is generally been followed by Dr. Oscar for the last several years. She has been on multiple different drug regimens for her diabetes including Victoza, Trulicity, another pill of unknown name, and she also did take metformin 500 mg once daily. She never ramped up. She does have elevated LFTs. She states her last hemoglobin A1c was in the sixes. She was doing very well on Victoza, but when increasing the dose, she would get severe nausea vomiting and diarrhea. She had minor nausea vomiting and diarrhea with every injection. She is not on a statin or ACE inhibitor or AR she does get yearly eye exams. She is checking her feet often. No neuropathy .     2. Obesity (BMI 30-39.9)  Chronic. Patient has lost weight. She states her diabetes is under better control with hemoglobin A1c in the sixes by her report. Last labs that we have show hemoglobin A1c of 7.7. She does not monitor her diet significantly but is exercising regularly    3. Indigestion  Chronic. Patient has always had occasional heartburn that is controlled by either dietary changes or PPI. She was started on Protonix in May after ER visit for nausea and vomiting. This helped significantly. She has been on this since May. Over the last month, she has been trying to wean herself off of the Protonix but disc heartburn with this when she stops. She is no longer on Carafate was on this before. She has never had an upper endoscopy. No weight loss, early satiety, melena, hematochezia.    4. Elevated LFTs  This is chronic. Patient states she has fatty liver. Her LFTs are always elevated. She does not drink alcohol in excess. Her report.    Current medicines (including changes today)  Current  "Outpatient Prescriptions   Medication Sig Dispense Refill   • pantoprazole (PROTONIX) 40 MG Tablet Delayed Response Take 40 mg by mouth every day.     • Cholecalciferol (VITAMIN D3) 14444 UNITS Cap Take 1 Cap by mouth every 7 days. 16 Cap 0   • levothyroxine (SYNTHROID) 175 MCG Tab Take 1 Tab by mouth Every morning on an empty stomach. 30 Tab    • liraglutide (VICTOZA) 18 MG/3ML Solution Pen-injector injection Inject 0.6 mg as instructed every day.     • TRULICITY 0.75 MG/0.5ML Solution Pen-injector INJECT THE CONTENTS OF 1 SYRINGE SUBCUTANEOUSLY ONCE WEEKLY  12     No current facility-administered medications for this visit.      She  has a past medical history of Diabetes (CMS-[facility]) and Thyroid disease.    ROS   No fevers  No bowel changes  No LE edema       Objective:     Blood pressure 112/70, pulse 76, temperature 36.7 °C (98 °F), resp. rate 12, height 1.676 m (5' 6\"), weight 86.6 kg (191 lb), SpO2 95 %. Body mass index is 30.83 kg/m².  Physical Exam:  Constitutional: Alert, no distress.  Skin: Warm, dry, good turgor, no rashes in visible areas.  Eye: Equal, round and reactive, conjunctiva clear, lids normal.  ENMT: Lips without lesions, good dentition, oropharynx clear.  Neck: Trachea midline, no masses, no thyromegaly. No cervical or supraclavicular lymphadenopathy  Respiratory: Unlabored respiratory effort, lungs clear to auscultation, no wheezes, no ronchi.  Cardiovascular: Normal S1, S2, RRR, no murmur, no edema.  Abdomen: Soft, non-tender, no masses, no hepatosplenomegaly.  Psych: Alert and oriented x3, normal affect and mood.      Assessment and Plan:   The following treatment plan was discussed    1. Controlled type 2 diabetes mellitus without complication, without long-term current use of insulin (CMS-[facility])  Chronic, not currently on medications due to multiple side effects from every medication including rash with Trulicity, nausea, vomiting, diarrhea with Victoza. She does have elevated LFTs in the " past metformin has not been maximized   Patient would like a second opinion, had been seeing Dr. Oscar in the past  Referral to endocrinology has been placed  - REFERRAL TO ENDOCRINOLOGY  - TSH WITH REFLEX TO FT4; Future  - HEMOGLOBIN A1C; Future  - COMP METABOLIC PANEL; Future  - MICROALBUMIN CREAT RATIO URINE; Future    2. Obesity (BMI 30-39.9)  Chronic, stable, counseled  - Patient identified as having weight management issue.  Appropriate orders and counseling given.  - TSH WITH REFLEX TO FT4; Future    3. Indigestion  Chronic, likely GERD has improved with Protonix. Discussed long-term side effects of PPI therapy. We did discuss weaning the Protonix to see if she is able to come off of this. If her heart burn remains severe, we may need upper endoscopy. Lifestyle modifications discussed.    4. Elevated LFTs  Chronic, labs ordered to check for stability. Weight loss recommended. Patient states she has had full workup in the past  - COMP METABOLIC PANEL; Future    5. Dyslipidemia  - LIPID PROFILE; Future  - TSH WITH REFLEX TO FT4; Future  - COMP METABOLIC PANEL; Future      Followup: Return in about 5 months (around 4/3/2018) for indigestion, diabetes .       This note was created using voice recognition software. I have made every reasonable attempt to correct errors, however, I do anticipate some grammatical errors.

## 2017-11-08 ENCOUNTER — HOSPITAL ENCOUNTER (OUTPATIENT)
Dept: LAB | Facility: MEDICAL CENTER | Age: 42
End: 2017-11-08
Attending: FAMILY MEDICINE
Payer: COMMERCIAL

## 2017-11-08 DIAGNOSIS — E78.5 DYSLIPIDEMIA: ICD-10-CM

## 2017-11-08 DIAGNOSIS — E11.9 CONTROLLED TYPE 2 DIABETES MELLITUS WITHOUT COMPLICATION, WITHOUT LONG-TERM CURRENT USE OF INSULIN (HCC): ICD-10-CM

## 2017-11-08 DIAGNOSIS — R79.89 ELEVATED LFTS: ICD-10-CM

## 2017-11-08 DIAGNOSIS — E66.9 OBESITY (BMI 30-39.9): ICD-10-CM

## 2017-11-08 LAB
ALBUMIN SERPL BCP-MCNC: 4.2 G/DL (ref 3.2–4.9)
ALBUMIN/GLOB SERPL: 1.6 G/DL
ALP SERPL-CCNC: 56 U/L (ref 30–99)
ALT SERPL-CCNC: 52 U/L (ref 2–50)
ANION GAP SERPL CALC-SCNC: 10 MMOL/L (ref 0–11.9)
AST SERPL-CCNC: 38 U/L (ref 12–45)
BILIRUB SERPL-MCNC: 0.5 MG/DL (ref 0.1–1.5)
BUN SERPL-MCNC: 9 MG/DL (ref 8–22)
CALCIUM SERPL-MCNC: 9.2 MG/DL (ref 8.5–10.5)
CHLORIDE SERPL-SCNC: 105 MMOL/L (ref 96–112)
CHOLEST SERPL-MCNC: 161 MG/DL (ref 100–199)
CO2 SERPL-SCNC: 22 MMOL/L (ref 20–33)
CREAT SERPL-MCNC: 0.6 MG/DL (ref 0.5–1.4)
CREAT UR-MCNC: 163.2 MG/DL
EST. AVERAGE GLUCOSE BLD GHB EST-MCNC: 157 MG/DL
GFR SERPL CREATININE-BSD FRML MDRD: >60 ML/MIN/1.73 M 2
GLOBULIN SER CALC-MCNC: 2.7 G/DL (ref 1.9–3.5)
GLUCOSE SERPL-MCNC: 134 MG/DL (ref 65–99)
HBA1C MFR BLD: 7.1 % (ref 0–5.6)
HDLC SERPL-MCNC: 39 MG/DL
LDLC SERPL CALC-MCNC: 80 MG/DL
MICROALBUMIN UR-MCNC: 1.3 MG/DL
MICROALBUMIN/CREAT UR: 8 MG/G (ref 0–30)
POTASSIUM SERPL-SCNC: 4 MMOL/L (ref 3.6–5.5)
PROT SERPL-MCNC: 6.9 G/DL (ref 6–8.2)
SODIUM SERPL-SCNC: 137 MMOL/L (ref 135–145)
T4 FREE SERPL-MCNC: 0.92 NG/DL (ref 0.53–1.43)
TRIGL SERPL-MCNC: 210 MG/DL (ref 0–149)
TSH SERPL DL<=0.005 MIU/L-ACNC: 9.24 UIU/ML (ref 0.3–3.7)

## 2017-11-08 PROCEDURE — 84439 ASSAY OF FREE THYROXINE: CPT

## 2017-11-08 PROCEDURE — 36415 COLL VENOUS BLD VENIPUNCTURE: CPT

## 2017-11-08 PROCEDURE — 82043 UR ALBUMIN QUANTITATIVE: CPT

## 2017-11-08 PROCEDURE — 80061 LIPID PANEL: CPT

## 2017-11-08 PROCEDURE — 82570 ASSAY OF URINE CREATININE: CPT

## 2017-11-08 PROCEDURE — 83036 HEMOGLOBIN GLYCOSYLATED A1C: CPT

## 2017-11-08 PROCEDURE — 84443 ASSAY THYROID STIM HORMONE: CPT

## 2017-11-08 PROCEDURE — 80053 COMPREHEN METABOLIC PANEL: CPT

## 2017-11-09 ENCOUNTER — TELEPHONE (OUTPATIENT)
Dept: MEDICAL GROUP | Facility: LAB | Age: 42
End: 2017-11-09

## 2017-11-10 NOTE — TELEPHONE ENCOUNTER
----- Message from Brissa Burrows M.D. sent at 11/9/2017 10:57 AM PST -----  Please verify follow up appointment to review labs

## 2017-11-17 ENCOUNTER — OFFICE VISIT (OUTPATIENT)
Dept: ENDOCRINOLOGY | Facility: MEDICAL CENTER | Age: 42
End: 2017-11-17
Payer: COMMERCIAL

## 2017-11-17 VITALS
HEIGHT: 66 IN | DIASTOLIC BLOOD PRESSURE: 78 MMHG | OXYGEN SATURATION: 95 % | BODY MASS INDEX: 31.02 KG/M2 | HEART RATE: 78 BPM | SYSTOLIC BLOOD PRESSURE: 116 MMHG | WEIGHT: 193 LBS

## 2017-11-17 DIAGNOSIS — E11.21 CONTROLLED TYPE 2 DIABETES MELLITUS WITH DIABETIC NEPHROPATHY, WITHOUT LONG-TERM CURRENT USE OF INSULIN (HCC): ICD-10-CM

## 2017-11-17 PROCEDURE — 99214 OFFICE O/P EST MOD 30 MIN: CPT | Performed by: PHYSICIAN ASSISTANT

## 2017-11-17 RX ORDER — LEVOTHYROXINE SODIUM 0.2 MG/1
200 TABLET ORAL
Qty: 30 TAB | Refills: 11 | Status: SHIPPED | OUTPATIENT
Start: 2017-11-17 | End: 2018-12-01 | Stop reason: SDUPTHER

## 2017-11-17 RX ORDER — PIOGLITAZONEHYDROCHLORIDE 15 MG/1
15 TABLET ORAL DAILY
Qty: 30 TAB | Refills: 11 | Status: SHIPPED | OUTPATIENT
Start: 2017-11-17 | End: 2017-12-17

## 2017-11-17 NOTE — PROGRESS NOTES
New Patient Consult Note  Referred by: Myra Kurtz M.D.    Reason for consult: Diabetes Management Type 2    HPI:  Aleena Diana is a 42 y.o. old patient who is seeing us today for diabetes care.  This is a pleasant patient with diabetes and I appreciate the opportunity to participate in the care of this patient.  This is a new patient with me today.    Labs of 11/8/17 GFR >60, HbA1c is 7.1, TSH    BG Diary:11/17/2017  In the AM: no log    Has been Diabetic since 2013 and gestational diabetes.   Has a Glucagon pen at home: no    1. Controlled type 2 diabetes mellitus with diabetic nephropathy, without long-term current use of insulin (CMS-[facility])  This is a new patient with me on 11/17/17  She is on:  1.   Nothing  Failed Metformin, SGLT2 (yeast infection), Trulicity Victoza (nausea)     2.  Hypothyroidism:  1.  Synthroid 175mcg.   INCREASE to 200    ROS:   Constitutional: No change in weight , No fatigue, No night sweats.  HEENT: No Headache.  Eyes:  No blurred vision, No visual changes.  Cardiac: No chest pain, No palpitations.  Resp: No shortness of breath, No cough,   Gastro: No nausea or vomiting, No diarrhea.  Neuro: Denies numbness or tinging in bilateral feet or hands, and no loss of sensation.  Endo: No heat or cold intolerance.  : No polyuria, No polydipsia, No chronic UTI's.  Lower extremities: No lower leg edema bilateral.  All other systems were reviewed and were negative.    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   • Hypothyroidism due to Hashimoto's thyroiditis 10/06/2016   • Obesity (BMI 30-39.9) 10/06/2016   • Seasonal allergic rhinitis 10/06/2016   • Erythema of lower extremity 10/06/2016   • Mild  "intermittent asthma without complication 10/06/2016   • Diabetes type 2, controlled (CMS-[facility])        Past Surgical History:  Past Surgical History:   Procedure Laterality Date   • APPENDECTOMY     • CHOLECYSTECTOMY     • PRIMARY C SECTION         Allergies:  Bee; Codeine; Influenza vaccines; Ivp [iodine]; Other food; Penicillins; and Tape    Social History:  Social History     Social History   • Marital status: Unknown     Spouse name: N/A   • Number of children: N/A   • Years of education: N/A     Occupational History   • Not on file.     Social History Main Topics   • Smoking status: Former Smoker     Quit date: 5/6/2013   • Smokeless tobacco: Never Used   • Alcohol use Yes      Comment: rare   • Drug use: No   • Sexual activity: Yes     Partners: Male     Birth control/ protection: IUD     Other Topics Concern   • Not on file     Social History Narrative   • No narrative on file       Family History:  Family History   Problem Relation Age of Onset   • Hypertension Mother    • Hypertension Father        Medications:    Current Outpatient Prescriptions:   •  levothyroxine (SYNTHROID) 200 MCG Tab, Take 1 Tab by mouth Every morning on an empty stomach., Disp: 30 Tab, Rfl: 11  •  pioglitazone (ACTOS) 15 MG Tab, Take 1 Tab by mouth every day for 30 days., Disp: 30 Tab, Rfl: 11  •  pantoprazole (PROTONIX) 40 MG Tablet Delayed Response, Take 40 mg by mouth every day., Disp: , Rfl:   •  Cholecalciferol (VITAMIN D3) 74961 UNITS Cap, Take 1 Cap by mouth every 7 days., Disp: 16 Cap, Rfl: 0      Physical Examination:   Vital signs: /78   Pulse 78   Ht 1.676 m (5' 6\")   Wt 87.5 kg (193 lb)   SpO2 95%   BMI 31.15 kg/m²   General: No distress, cooperative, well dressed and well nourished.   Eyes: No scleral icterus or discharge, No hyposphagma  ENMT: Normal on external inspection of nose, lips, No nasal drainage   Neck: No abnormal masses on inspection  Resp: Normal effort, Bilateral clear to auscultation, No " wheezing, No rales  CVS: Regular rate and rhythm, S1 S2 normal, No murmur. No gallop  Extremities: No edema bilateral extremities  Neuro: Alert and oriented  Skin: No rash, No Ulcers  Psych: Normal mood and affect      Assessment and Plan:    1. Controlled type 2 diabetes mellitus with diabetic nephropathy, without long-term current use of insulin (CMS-[facility])  This is a new patient with me on 11/17/17  She is on:  1.   Nothing currently  Failed Metformin, SGLT2 (yeast infection), Trulicity Victoza (nausea)     START   Actos 15mg once a day/  Will increase to 30mg if needed if she fails this the next med is Insulin.      2.  Hypothyroidism:  1.  Synthroid 175mcg.   INCREASE to 200    The total time spent seeing this patient today face to face in consultation, and formulating an action plan for this visit was greater than 25 minutes. > Than 50% of this time was spent counseling, discussing problems documented above and below, coordinating care and answering questions by the physician assistant.  We developed a diabetes care plan for this patient today.    Return in about 2 weeks (around 12/1/2017).    Blood glucose log: Check BG in the morning when wake up, before lunch or dinner and before bed.  So three times a day.  Always bring BG diary to the next office visit.     This patient during there office visit was started on new medication Actos.  Side effects of new medications were discussed with the patient today in the office. The patient was supplied paperwork on this new medication.    Thank you kindly for allowing me to participate in the diabetes care plan for this patient.    Josse Escalera PA-C, BC-ADM  Board Certified - Advanced Diabetes Management  11/17/17    CC:   Myra Kurtz M.D.

## 2017-12-15 ENCOUNTER — HOSPITAL ENCOUNTER (OUTPATIENT)
Dept: LAB | Facility: MEDICAL CENTER | Age: 42
End: 2017-12-15
Attending: PHYSICIAN ASSISTANT
Payer: COMMERCIAL

## 2017-12-15 DIAGNOSIS — E11.21 CONTROLLED TYPE 2 DIABETES MELLITUS WITH DIABETIC NEPHROPATHY, WITHOUT LONG-TERM CURRENT USE OF INSULIN (HCC): ICD-10-CM

## 2017-12-15 PROCEDURE — 86341 ISLET CELL ANTIBODY: CPT

## 2017-12-15 PROCEDURE — 36415 COLL VENOUS BLD VENIPUNCTURE: CPT

## 2017-12-15 PROCEDURE — 84681 ASSAY OF C-PEPTIDE: CPT

## 2017-12-15 PROCEDURE — 83516 IMMUNOASSAY NONANTIBODY: CPT

## 2017-12-15 PROCEDURE — 86337 INSULIN ANTIBODIES: CPT

## 2017-12-17 LAB
C PEPTIDE SERPL-MCNC: 4.1 NG/ML (ref 0.8–3.5)
GAD65 AB SER IA-ACNC: >250 IU/ML (ref 0–5)

## 2017-12-18 LAB — PANC ISLET CELL AB TITR SER: NORMAL {TITER}

## 2017-12-20 ENCOUNTER — TELEPHONE (OUTPATIENT)
Dept: ENDOCRINOLOGY | Facility: MEDICAL CENTER | Age: 42
End: 2017-12-20

## 2017-12-20 LAB — INSULIN HUMAN AB SER-ACNC: <0.4 U/ML (ref 0–0.4)

## 2017-12-20 NOTE — TELEPHONE ENCOUNTER
Pt called and states she was put on Actos 15mg on 11/17/17 and has not been feeling well lately. Also her blood sugar readings are elevated and reaching 250. She would like to know if she should be put on insulin instead?

## 2017-12-21 ENCOUNTER — TELEPHONE (OUTPATIENT)
Dept: ENDOCRINOLOGY | Facility: MEDICAL CENTER | Age: 42
End: 2017-12-21

## 2018-01-12 ENCOUNTER — OFFICE VISIT (OUTPATIENT)
Dept: ENDOCRINOLOGY | Facility: MEDICAL CENTER | Age: 43
End: 2018-01-12
Payer: COMMERCIAL

## 2018-01-12 VITALS
WEIGHT: 195.2 LBS | OXYGEN SATURATION: 94 % | DIASTOLIC BLOOD PRESSURE: 80 MMHG | HEIGHT: 66 IN | SYSTOLIC BLOOD PRESSURE: 117 MMHG | HEART RATE: 86 BPM | BODY MASS INDEX: 31.37 KG/M2

## 2018-01-12 DIAGNOSIS — E11.21 CONTROLLED TYPE 2 DIABETES MELLITUS WITH DIABETIC NEPHROPATHY, WITHOUT LONG-TERM CURRENT USE OF INSULIN (HCC): ICD-10-CM

## 2018-01-12 DIAGNOSIS — E06.3 HYPOTHYROIDISM DUE TO HASHIMOTO'S THYROIDITIS: ICD-10-CM

## 2018-01-12 DIAGNOSIS — E03.8 HYPOTHYROIDISM DUE TO HASHIMOTO'S THYROIDITIS: ICD-10-CM

## 2018-01-12 PROCEDURE — 99214 OFFICE O/P EST MOD 30 MIN: CPT | Performed by: PHYSICIAN ASSISTANT

## 2018-01-12 NOTE — PROGRESS NOTES
Return to office Patient Consult Note  Referred by: Myra Kurtz M.D.    Reason for consult: Diabetes Management Type 2    HPI:  Aleena Diana is a 42 y.o. old patient who is seeing us today for diabetes care.  This is a pleasant patient with diabetes and I appreciate the opportunity to participate in the care of this patient.    BG Diary:1/12/2018  In the AM: did not bring    Labs of 12/15/17 Shashank-65 >250.00, c-peptide 4.1  Labs of 11/8/17 GFR >60, HbA1c is 7.1, TSH 9.2     BG Diary:11/17/2017  In the AM: no log     Has been Diabetic since 2013 and gestational diabetes.   Has a Glucagon pen at home: no      1. Controlled type 2 diabetes mellitus with diabetic nephropathy, without long-term current use of insulin (CMS-HCC)  This is a new patient with me on 11/17/17  She is on:  1.   Nothing  Failed Metformin, SGLT2 (yeast infection), Trulicity Victoza (nausea)     Started on Actos 15mg one a day (11/17/17) stopped because of panic attacks.       2.  Hypothyroidism:  1.  Synthroid 175mcg.   INCREASE to 200 on 11/17/17      ROS:   Constitutional: No night sweats.  Eyes:  No visual changes.  Cardiac: No chest pain, No palpitations or racing heart rate.  Resp: No shortness of breath, No cough,   Gi: No Diarrhea    All other systems were reviewed and were/are negative.  The ROS was revised/revisited during this office visit from the patients first office visit with me on 11/17/18 Please review the full ROS during the first office visit.    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   • Hypothyroidism due to Hashimoto's thyroiditis 10/06/2016   • Obesity (BMI 30-39.9) 10/06/2016   • Seasonal allergic rhinitis 10/06/2016   •  "Erythema of lower extremity 10/06/2016   • Mild intermittent asthma without complication 10/06/2016   • Diabetes type 2, controlled (CMS-[facility])        Past Surgical History:  Past Surgical History:   Procedure Laterality Date   • APPENDECTOMY     • CHOLECYSTECTOMY     • PRIMARY C SECTION         Allergies:  Bee; Codeine; Influenza vaccines; Ivp [iodine]; Other food; Penicillins; and Tape    Social History:  Social History     Social History   • Marital status: Unknown     Spouse name: N/A   • Number of children: N/A   • Years of education: N/A     Occupational History   • Not on file.     Social History Main Topics   • Smoking status: Former Smoker     Quit date: 5/6/2013   • Smokeless tobacco: Never Used   • Alcohol use Yes      Comment: rare   • Drug use: No   • Sexual activity: Yes     Partners: Male     Birth control/ protection: IUD     Other Topics Concern   • Not on file     Social History Narrative   • No narrative on file       Family History:  Family History   Problem Relation Age of Onset   • Hypertension Mother    • Hypertension Father        Medications:    Current Outpatient Prescriptions:   •  glucose blood (ONETOUCH VERIO) strip, 1 Strip by Other route 3 times a day before meals., Disp: 100 Strip, Rfl: 1  •  levothyroxine (SYNTHROID) 200 MCG Tab, Take 1 Tab by mouth Every morning on an empty stomach., Disp: 30 Tab, Rfl: 11  •  pantoprazole (PROTONIX) 40 MG Tablet Delayed Response, Take 40 mg by mouth every day., Disp: , Rfl:   •  Cholecalciferol (VITAMIN D3) 80795 UNITS Cap, Take 1 Cap by mouth every 7 days., Disp: 16 Cap, Rfl: 0        Physical Examination:   Vital signs: /80   Pulse 86   Ht 1.676 m (5' 6\")   Wt 88.5 kg (195 lb 3.2 oz)   SpO2 94%   BMI 31.51 kg/m²   General: No distress, cooperative, well dressed and well nourished.   Eyes: No scleral icterus or discharge, No hyposphagma  ENMT: Normal on external inspection of nose, lips, No nasal drainage   Neck: No abnormal masses on " inspection  Resp: Normal effort, Bilateral clear to auscultation, No wheezing, No rales  CVS: Regular rate and rhythm, S1 S2 normal, No murmur. No gallop  Extremities: No edema bilateral extremities  Neuro: Alert and oriented  Skin: No rash, No Ulcers  Psych: Normal mood and affect      Assessment and Plan:    1. Controlled type 2 diabetes mellitus with diabetic nephropathy, without long-term current use of insulin (CMS-HCC)    She agreed to start on   1.  Victoza 3 clicks a day and   2.  Tresiba 8 units a night.        Return in about 3 weeks (around 2/2/2018).    Blood glucose log: Check BG in the morning when wake up, before lunch or dinner and before bed.  So three times a day.  Always bring BG diary to the next office visit.     This patient during there office visit today was started on a new medication vicotza and tresiba.  Side effects of the new medication were discussed with the patient today in the office.       Thank you kindly for allowing me to participate in the diabetes care plan for this patient.    Josse Escalera PA-C, BC-ADM  Board Certified - Advanced Diabetes Management  01/12/18    CC:   Myra Kurtz M.D.

## 2018-01-15 ENCOUNTER — HOSPITAL ENCOUNTER (OUTPATIENT)
Dept: LAB | Facility: MEDICAL CENTER | Age: 43
End: 2018-01-15
Attending: PHYSICIAN ASSISTANT
Payer: COMMERCIAL

## 2018-01-15 DIAGNOSIS — E11.21 CONTROLLED TYPE 2 DIABETES MELLITUS WITH DIABETIC NEPHROPATHY, WITHOUT LONG-TERM CURRENT USE OF INSULIN (HCC): ICD-10-CM

## 2018-01-15 LAB
THYROPEROXIDASE AB SERPL-ACNC: 3044 IU/ML (ref 0–9)
TSH SERPL DL<=0.005 MIU/L-ACNC: 2.16 UIU/ML (ref 0.38–5.33)

## 2018-01-15 PROCEDURE — 84443 ASSAY THYROID STIM HORMONE: CPT

## 2018-01-15 PROCEDURE — 86376 MICROSOMAL ANTIBODY EACH: CPT

## 2018-01-15 PROCEDURE — 36415 COLL VENOUS BLD VENIPUNCTURE: CPT

## 2018-02-02 ENCOUNTER — OFFICE VISIT (OUTPATIENT)
Dept: ENDOCRINOLOGY | Facility: MEDICAL CENTER | Age: 43
End: 2018-02-02
Payer: COMMERCIAL

## 2018-02-02 VITALS
HEIGHT: 66 IN | HEART RATE: 80 BPM | BODY MASS INDEX: 31.5 KG/M2 | WEIGHT: 196 LBS | OXYGEN SATURATION: 96 % | DIASTOLIC BLOOD PRESSURE: 68 MMHG | SYSTOLIC BLOOD PRESSURE: 106 MMHG

## 2018-02-02 DIAGNOSIS — E11.21 CONTROLLED TYPE 2 DIABETES MELLITUS WITH DIABETIC NEPHROPATHY, WITHOUT LONG-TERM CURRENT USE OF INSULIN (HCC): ICD-10-CM

## 2018-02-02 PROCEDURE — 99214 OFFICE O/P EST MOD 30 MIN: CPT | Performed by: PHYSICIAN ASSISTANT

## 2018-02-02 RX ORDER — LIRAGLUTIDE 6 MG/ML
1.8 INJECTION SUBCUTANEOUS DAILY
Qty: 3 PEN | Refills: 6 | Status: SHIPPED | OUTPATIENT
Start: 2018-02-02 | End: 2018-04-05 | Stop reason: SDUPTHER

## 2018-02-02 NOTE — PROGRESS NOTES
Return to office Patient Consult Note  Referred by: Myra Kurtz M.D.    Reason for consult: Diabetes Management Type 2    HPI:  Aleena Diana is a 42 y.o. old patient who is seeing us today for diabetes care.  This is a pleasant patient with diabetes and I appreciate the opportunity to participate in the care of this patient.    BG Diary:2/2/2018  In the AM: 146, 157, 147, 153, 151  Before Bed: 206, 209, 261, 223, 201, 196     Labs of 1/15/18 TSH 2.1  Labs of 12/17/18 c-peptide 4.1, DONNA-65 >250.00, TPO 3044  Labs of 11/8/17 GFR >60, HbA1c is 7.1,      BG Diary:11/17/2017  In the AM: no log     Has been Diabetic since 2013 and gestational diabetes.     Weight: today 196 pounds      1. Controlled type 2 diabetes mellitus with diabetic nephropathy, without long-term current use of insulin (CMS-[facility])  This is a new patient with me on 11/17/17  She is on:  1.   Nothing  Failed Metformin, SGLT2 (yeast infection), Trulicity Victoza (nausea)     She agreed to start on   1.  Victoza 3 clicks a day and   2.  Tresiba 8 units a night.       2.  Hypothyroidism:  1.  Synthroid 200mcg.              ROS:   Constitutional: No night sweats.  Eyes:  No visual changes.  Cardiac: No chest pain, No palpitations or racing heart rate.  Resp: No shortness of breath, No cough,   Gi: No Diarrhea    All other systems were reviewed and were/are negative.  The ROS was revised/revisited during this office visit from the patients first office visit with me on 11/17/17 Please review the full ROS during the first office visit.    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   • Hypothyroidism due to Hashimoto's thyroiditis 10/06/2016   • Obesity (BMI  "30-39.9) 10/06/2016   • Seasonal allergic rhinitis 10/06/2016   • Erythema of lower extremity 10/06/2016   • Mild intermittent asthma without complication 10/06/2016   • Diabetes type 2, controlled (CMS-[facility])        Past Surgical History:  Past Surgical History:   Procedure Laterality Date   • APPENDECTOMY     • CHOLECYSTECTOMY     • PRIMARY C SECTION         Allergies:  Bee; Codeine; Influenza vaccines; Ivp [iodine]; Other food; Penicillins; and Tape    Social History:  Social History     Social History   • Marital status: Unknown     Spouse name: N/A   • Number of children: N/A   • Years of education: N/A     Occupational History   • Not on file.     Social History Main Topics   • Smoking status: Former Smoker     Quit date: 5/6/2013   • Smokeless tobacco: Never Used   • Alcohol use Yes      Comment: rare   • Drug use: No   • Sexual activity: Yes     Partners: Male     Birth control/ protection: IUD     Other Topics Concern   • Not on file     Social History Narrative   • No narrative on file       Family History:  Family History   Problem Relation Age of Onset   • Hypertension Mother    • Hypertension Father        Medications:    Current Outpatient Prescriptions:   •  glucose blood (ONETOUCH VERIO) strip, 1 Strip by Other route 3 times a day before meals., Disp: 100 Strip, Rfl: 1  •  levothyroxine (SYNTHROID) 200 MCG Tab, Take 1 Tab by mouth Every morning on an empty stomach., Disp: 30 Tab, Rfl: 11  •  Cholecalciferol (VITAMIN D3) 46659 UNITS Cap, Take 1 Cap by mouth every 7 days., Disp: 16 Cap, Rfl: 0  •  pantoprazole (PROTONIX) 40 MG Tablet Delayed Response, Take 40 mg by mouth every day., Disp: , Rfl:         Physical Examination:   Vital signs: /68   Pulse 80   Ht 1.676 m (5' 6\")   Wt 88.9 kg (196 lb)   SpO2 96%   BMI 31.64 kg/m²   General: No distress, cooperative, well dressed and well nourished.   Eyes: No scleral icterus or discharge, No hyposphagma  ENMT: Normal on external inspection of nose, " lips, No nasal drainage   Neck: No abnormal masses on inspection  Resp: Normal effort, Bilateral clear to auscultation, No wheezing, No rales  CVS: Regular rate and rhythm, S1 S2 normal, No murmur. No gallop  Extremities: No edema bilateral extremities  Neuro: Alert and oriented  Skin: No rash, No Ulcers  Psych: Normal mood and affect      Assessment and Plan:    1. Controlled type 2 diabetes mellitus with diabetic nephropathy, without long-term current use of insulin (CMS-[facility])    She is on:  1.  Victoza 3 clicks a day and   2.  Tresiba 8 units a night.  (Increase to 1.2)     2.  Hypothyroidism:  1.  Synthroid 200mcg.       The total time spent seeing this patient today face to face in consultation, and formulating an action plan for this visit was greater than 25 minutes. > Than 50% of this time was spent counseling, discussing problems documented above and below, coordinating care and answering questions by the physician assistant.  We developed a diabetes care plan for this patient today.      Return in about 1 month (around 3/2/2018).    Blood glucose log: Check BG in the morning when wake up, before lunch or dinner and before bed.  So three times a day.  Always bring BG diary to the next office visit.     Thank you kindly for allowing me to participate in the diabetes care plan for this patient.    Josse Escalera PA-C, BC-ADM  Board Certified - Advanced Diabetes Management  02/02/18    CC:   Myra Kurtz M.D.

## 2018-03-02 ENCOUNTER — APPOINTMENT (OUTPATIENT)
Dept: ENDOCRINOLOGY | Facility: MEDICAL CENTER | Age: 43
End: 2018-03-02
Payer: OTHER GOVERNMENT

## 2018-03-09 ENCOUNTER — OFFICE VISIT (OUTPATIENT)
Dept: ENDOCRINOLOGY | Facility: MEDICAL CENTER | Age: 43
End: 2018-03-09
Payer: COMMERCIAL

## 2018-03-09 VITALS
HEIGHT: 66 IN | BODY MASS INDEX: 31.18 KG/M2 | OXYGEN SATURATION: 96 % | SYSTOLIC BLOOD PRESSURE: 122 MMHG | WEIGHT: 194 LBS | DIASTOLIC BLOOD PRESSURE: 72 MMHG | HEART RATE: 83 BPM

## 2018-03-09 DIAGNOSIS — E11.21 CONTROLLED TYPE 2 DIABETES MELLITUS WITH DIABETIC NEPHROPATHY, WITHOUT LONG-TERM CURRENT USE OF INSULIN (HCC): ICD-10-CM

## 2018-03-09 LAB
HBA1C MFR BLD: 6.9 % (ref ?–5.8)
INT CON NEG: NEGATIVE
INT CON POS: POSITIVE

## 2018-03-09 PROCEDURE — 83036 HEMOGLOBIN GLYCOSYLATED A1C: CPT | Performed by: PHYSICIAN ASSISTANT

## 2018-03-09 PROCEDURE — 99213 OFFICE O/P EST LOW 20 MIN: CPT | Performed by: PHYSICIAN ASSISTANT

## 2018-03-09 NOTE — PROGRESS NOTES
Return to office Patient Consult Note  Referred by: Myra Kurtz M.D.    Reason for consult: Diabetes Management Type 2    HPI:  Aleena Diana is a 42 y.o. old patient who is seeing us today for diabetes care.  This is a pleasant patient with diabetes and I appreciate the opportunity to participate in the care of this patient.    BG Diary:3/9/2018  In the AM: 127, 130, 126, 121, 139  Before Bed: 146, 99, 101, 142, 123, 173     BG Diary:2/2/2018  In the AM: 146, 157, 147, 153, 151  Before Bed: 206, 209, 261, 223, 201, 196      Labs of 1/15/18 TSH 2.1  Labs of 12/17/18 c-peptide 4.1, DONNA-65 >250.00, TPO 3044  Labs of 11/8/17 GFR >60, HbA1c is 7.1,      BG Diary:11/17/2017  In the AM: no log     Has been Diabetic since 2013 and gestational diabetes.      Weight: today 194 pounds        1. Controlled type 2 diabetes mellitus with diabetic nephropathy, without long-term current use of insulin (CMS-[facility])  This is a new patient with me on 11/17/17  She was on:  1.   Nothing  Failed Metformin, SGLT2 (yeast infection), Trulicity Victoza (nausea)      She agreed to start on   1.  Victoza 0.3 at night (She is going up one click a week)  2.  Tresiba 8 units a night.       2.  Hypothyroidism:  1.  Synthroid 200mcg.          ROS:   Constitutional: No night sweats.  Eyes:  No visual changes.  Cardiac: No chest pain, No palpitations or racing heart rate.  Resp: No shortness of breath, No cough,   Gi: No Diarrhea    All other systems were reviewed and were/are negative.  The ROS was revised/revisited during this office visit from the patients first office visit with me on 11/17/17 Please review the full ROS during the first office visit.    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   • Hypothyroidism due to Hashimoto's thyroiditis 10/06/2016   • Obesity (BMI 30-39.9) 10/06/2016   • Seasonal allergic rhinitis 10/06/2016   • Erythema of lower extremity 10/06/2016   • Mild intermittent asthma without complication 10/06/2016   • Diabetes type 2, controlled (CMS-[facility])        Past Surgical History:  Past Surgical History:   Procedure Laterality Date   • APPENDECTOMY     • CHOLECYSTECTOMY     • PRIMARY C SECTION         Allergies:  Bee; Codeine; Influenza vaccines; Ivp [iodine]; Other food; Penicillins; and Tape    Social History:  Social History     Social History   • Marital status: Unknown     Spouse name: N/A   • Number of children: N/A   • Years of education: N/A     Occupational History   • Not on file.     Social History Main Topics   • Smoking status: Former Smoker     Quit date: 5/6/2013   • Smokeless tobacco: Never Used   • Alcohol use Yes      Comment: rare   • Drug use: No   • Sexual activity: Yes     Partners: Male     Birth control/ protection: IUD     Other Topics Concern   • Not on file     Social History Narrative   • No narrative on file       Family History:  Family History   Problem Relation Age of Onset   • Hypertension Mother    • Hypertension Father        Medications:    Current Outpatient Prescriptions:   •  Insulin Degludec (TRESIBA FLEXTOUCH) 200 UNIT/ML Solution Pen-injector, Inject 50 Units as instructed every bedtime., Disp: 3 PEN, Rfl: 2  •  VICTOZA 18 MG/3ML Solution Pen-injector injection, Inject 0.3 mL as instructed every day., Disp: 3 PEN, Rfl: 6  •  glucose blood (ONETOUCH VERIO) strip, 1 Strip by Other route 3 times a day before meals., Disp: 100 Strip, Rfl: 1  •  levothyroxine (SYNTHROID) 200 MCG Tab, Take 1 Tab by mouth Every morning on an empty stomach., Disp: 30 Tab, Rfl: 11  •  pantoprazole (PROTONIX) 40 MG Tablet Delayed Response, Take 40 mg by mouth every day., Disp: , Rfl:   •  Cholecalciferol (VITAMIN D3)  "73829 UNITS Cap, Take 1 Cap by mouth every 7 days., Disp: 16 Cap, Rfl: 0        Physical Examination:   Vital signs: /72   Pulse 83   Ht 1.676 m (5' 5.98\")   Wt 88 kg (194 lb)   SpO2 96%   BMI 31.33 kg/m²   General: No distress, cooperative, well dressed and well nourished.   Eyes: No scleral icterus or discharge, No hyposphagma  ENMT: Normal on external inspection of nose, lips, No nasal drainage   Neck: No abnormal masses on inspection  Resp: Normal effort, Bilateral clear to auscultation, No wheezing, No rales  CVS: Regular rate and rhythm, S1 S2 normal, No murmur. No gallop  Extremities: No edema bilateral extremities  Neuro: Alert and oriented  Skin: No rash, No Ulcers  Psych: Normal mood and affect      Assessment and Plan:    1. Controlled type 2 diabetes mellitus with diabetic nephropathy, without long-term current use of insulin (CMS-[facility])    She agreed to start on   1.  Victoza 0.3 at night (She is going up one click a week)  2.  Tresiba 8 units a night.       2.  Hypothyroidism:  1.  Synthroid 200mcg.       Return in about 2 months (around 5/9/2018).    Blood glucose log: Check BG in the morning when wake up, before lunch or dinner and before bed.  So three times a day.  Always bring BG diary to the next office visit.     Thank you kindly for allowing me to participate in the diabetes care plan for this patient.    Josse Escalera PA-C, BC-ADM  Board Certified - Advanced Diabetes Management  03/09/18    CC:   Myra Kurtz M.D.    "

## 2018-04-05 DIAGNOSIS — E11.9 TYPE 2 DIABETES MELLITUS WITHOUT COMPLICATION, WITH LONG-TERM CURRENT USE OF INSULIN (HCC): ICD-10-CM

## 2018-04-05 DIAGNOSIS — Z79.4 TYPE 2 DIABETES MELLITUS WITHOUT COMPLICATION, WITH LONG-TERM CURRENT USE OF INSULIN (HCC): ICD-10-CM

## 2018-04-05 RX ORDER — LIRAGLUTIDE 6 MG/ML
1.8 INJECTION SUBCUTANEOUS DAILY
Qty: 3 PEN | Refills: 0 | Status: SHIPPED | OUTPATIENT
Start: 2018-04-05 | End: 2018-09-17

## 2018-04-05 NOTE — TELEPHONE ENCOUNTER
Was the patient seen in the last year in this department? Yes     Does patient have an active prescription for medications requested? No     Received Request Via: Pharmacy     Pt my charted us for a refill to be sent to her pharmacy for her insulin pens

## 2018-04-05 NOTE — TELEPHONE ENCOUNTER
----- Message from Aleena Diana sent at 4/4/2018  6:54 PM PDT -----  Regarding: Prescription Question  Contact: [phone]  I did not get a rx for the insulin that I am taking,  I thought that it was being called into the pharmacy and it was not. Please advise.     Thanks, Susanna

## 2018-04-09 ENCOUNTER — OFFICE VISIT (OUTPATIENT)
Dept: MEDICAL GROUP | Facility: PHYSICIAN GROUP | Age: 43
End: 2018-04-09
Payer: COMMERCIAL

## 2018-04-09 VITALS
BODY MASS INDEX: 31.66 KG/M2 | RESPIRATION RATE: 16 BRPM | TEMPERATURE: 97.9 F | OXYGEN SATURATION: 97 % | HEART RATE: 62 BPM | DIASTOLIC BLOOD PRESSURE: 62 MMHG | HEIGHT: 66 IN | SYSTOLIC BLOOD PRESSURE: 118 MMHG | WEIGHT: 197 LBS

## 2018-04-09 DIAGNOSIS — E06.3 HYPOTHYROIDISM DUE TO HASHIMOTO'S THYROIDITIS: ICD-10-CM

## 2018-04-09 DIAGNOSIS — T78.00XD ANAPHYLACTIC SHOCK DUE TO FOOD, SUBSEQUENT ENCOUNTER: ICD-10-CM

## 2018-04-09 DIAGNOSIS — E11.21 CONTROLLED TYPE 2 DIABETES MELLITUS WITH DIABETIC NEPHROPATHY, WITHOUT LONG-TERM CURRENT USE OF INSULIN (HCC): ICD-10-CM

## 2018-04-09 DIAGNOSIS — E66.9 OBESITY (BMI 30-39.9): ICD-10-CM

## 2018-04-09 DIAGNOSIS — M79.641 RIGHT HAND PAIN: ICD-10-CM

## 2018-04-09 DIAGNOSIS — E03.8 HYPOTHYROIDISM DUE TO HASHIMOTO'S THYROIDITIS: ICD-10-CM

## 2018-04-09 DIAGNOSIS — M72.2 PLANTAR FASCIITIS: ICD-10-CM

## 2018-04-09 DIAGNOSIS — Z12.39 SCREENING FOR BREAST CANCER: ICD-10-CM

## 2018-04-09 PROCEDURE — 99214 OFFICE O/P EST MOD 30 MIN: CPT | Performed by: NURSE PRACTITIONER

## 2018-04-09 RX ORDER — EPINEPHRINE 0.3 MG/.3ML
0.3 INJECTION SUBCUTANEOUS ONCE
Qty: 0.3 ML | Refills: 0 | Status: SHIPPED | OUTPATIENT
Start: 2018-04-09 | End: 2018-04-09

## 2018-04-09 RX ORDER — NAPROXEN 500 MG/1
500 TABLET ORAL 2 TIMES DAILY WITH MEALS
Qty: 60 TAB | Refills: 3 | Status: SHIPPED | OUTPATIENT
Start: 2018-04-09 | End: 2018-09-17

## 2018-04-10 NOTE — ASSESSMENT & PLAN NOTE
This is a chronic condition, stable and well-controlled on current medications. She is followed by endocrinologist and does have upcoming appointment.

## 2018-04-10 NOTE — ASSESSMENT & PLAN NOTE
This is a chronic condition, stable and well-controlled with current medications, she is followed by endocrinology and does have a current appointment.

## 2018-04-10 NOTE — ASSESSMENT & PLAN NOTE
Patient does have history of plantar fasciitis bilaterally, worse on the left foot. She has been trying all the supportive measures including over-the-counter anti-inflammatories, rolling her foot on an iced water bottle and stretching. She does run on a daily basis which he thinks is exacerbating her pain. She is not interested in a referral to podiatry for injections as of yet, she would like to try physical therapy first. Referral has been placed for her. We'll also call in naproxen, 500 mg twice daily with food. She can also take this for her right hand pain.

## 2018-04-10 NOTE — ASSESSMENT & PLAN NOTE
"Pt has R hand pain, dick side, in the center that extends up into the middle and sometime down into to inner forearm. She does not recall any particular injury but did start noticing it after she had a massage about 4 weeks ago. A couple of days after her massage it went away, but came back a couple of weeks ago. It now comes and goes. She has pain when she tried to grab items and it feels like she needs to \"crack her knuckle.\" She has tried ibuprofen and ice, which did not really help.   "

## 2018-04-10 NOTE — PROGRESS NOTES
"Chief Complaint   Patient presents with   • Foot Problem     L foot pain, refill epi pen         This is a 43 y.o.female patient that presents today with the following: Establish care with a PCP, discuss acute and chronic conditions    Right hand pain  Pt has R hand pain, dick side, in the center that extends up into the middle and sometime down into to inner forearm. She does not recall any particular injury but did start noticing it after she had a massage about 4 weeks ago. A couple of days after her massage it went away, but came back a couple of weeks ago. It now comes and goes. She has pain when she tried to grab items and it feels like she needs to \"crack her knuckle.\" She has tried ibuprofen and ice, which did not really help.     Diabetes type 2, controlled (CMS-[facility])  This is a chronic condition, stable and well-controlled on current medications. She is followed by endocrinologist and does have upcoming appointment.    Hypothyroidism due to Hashimoto's thyroiditis  This is a chronic condition, stable and well-controlled with current medications, she is followed by endocrinology and does have a current appointment.    Obesity (BMI 30-39.9)  This is a chronic condition, uncontrolled. Patient's weight is 197 pounds, BMI is 31.8%. She does continue to work on this and has increased her physical activity. We will reassess at her follow-up appointment.    Plantar fasciitis  Patient does have history of plantar fasciitis bilaterally, worse on the left foot. She has been trying all the supportive measures including over-the-counter anti-inflammatories, rolling her foot on an iced water bottle and stretching. She does run on a daily basis which he thinks is exacerbating her pain. She is not interested in a referral to podiatry for injections as of yet, she would like to try physical therapy first. Referral has been placed for her. We'll also call in naproxen, 500 mg twice daily with food. She can also take this " for her right hand pain.      No visits with results within 1 Month(s) from this visit.   Latest known visit with results is:   Office Visit on 03/09/2018   Component Date Value   • Glycohemoglobin 03/09/2018 6.9    • Internal Control Negative 03/09/2018 Negative    • Internal Control Positive 03/09/2018 Positive          clinical course has been stable    Past Medical History:   Diagnosis Date   • Diabetes (CMS-[facility])    • Thyroid disease        Past Surgical History:   Procedure Laterality Date   • APPENDECTOMY     • CHOLECYSTECTOMY     • PRIMARY C SECTION         Family History   Problem Relation Age of Onset   • Hypertension Mother    • Hypertension Father        Bee; Codeine; Influenza vaccines; Ivp [iodine]; Other food; Penicillins; and Tape    Current Outpatient Prescriptions Ordered in [facility]   Medication Sig Dispense Refill   • EPINEPHrine (EPIPEN) 0.3 MG/0.3ML Solution Auto-injector solution for injection 0.3 mL by Intramuscular route Once for 1 dose. 0.3 mL 0   • naproxen (NAPROSYN) 500 MG Tab Take 1 Tab by mouth 2 times a day, with meals. 60 Tab 3   • VICTOZA 18 MG/3ML Solution Pen-injector injection Inject 0.3 mL as instructed every day. 3 PEN 0   • Insulin Degludec (TRESIBA FLEXTOUCH) 200 UNIT/ML Solution Pen-injector Inject 50 Units as instructed every bedtime. 3 PEN 0   • glucose blood (ONETOUCH VERIO) strip 1 Strip by Other route 3 times a day before meals. 100 Strip 1   • levothyroxine (SYNTHROID) 200 MCG Tab Take 1 Tab by mouth Every morning on an empty stomach. 30 Tab 11   • pantoprazole (PROTONIX) 40 MG Tablet Delayed Response Take 40 mg by mouth every day.     • Cholecalciferol (VITAMIN D3) 98919 UNITS Cap Take 1 Cap by mouth every 7 days. 16 Cap 0     No current Epic-ordered facility-administered medications on file.        Constitutional ROS: No unexpected change in weight, No weakness, No unexplained fevers, sweats, or chills  Pulmonary ROS: No chronic cough, sputum, or hemoptysis, No shortness  "of breath, No recent change in breathing  Cardiovascular ROS: No chest pain, No edema, No palpitations  Gastrointestinal ROS: No abdominal pain, No nausea, vomiting, diarrhea, or constipation  Musculoskeletal/Extremities ROS: positive HPI   Neurologic ROS: Normal development, No seizures, No weakness  Endocrine ROS: positive per HPI    Physical exam:  /62   Pulse 62   Temp 36.6 °C (97.9 °F)   Resp 16   Ht 1.676 m (5' 6\")   Wt 89.4 kg (197 lb)   SpO2 97%   BMI 31.80 kg/m²   General Appearance: Middle-aged female, alert, no distress, obese, well-groomed  Skin: Skin color, texture, turgor normal. No rashes or lesions.  Lungs: negative findings: normal respiratory rate and rhythm, lungs clear to auscultation  Heart: negative. RRR without murmur, gallop, or rubs.  No ectopy.  Abdomen: Abdomen soft, non-tender. BS normal. No masses,  No organomegaly  Extremities: positive findings: Tenderness over middle flexor tendon on the right hand, very mildly limited range of motion to her right middle finger due to pain.  Musculoskeletal: negative findings: no evidence of joint instability, strength normal, no deformities present  Neurologic: intact, oriented, mood appropriate, judgment intact. Cranial nerves II through XII grossly intact    Medical decision making/discussion: Patient has been referred to physical therapy for pain in her left foot secondary to plantar fasciitis. We'll have her try naproxen 500 mg twice daily with food for pain in her left foot as well as in her right hand. Her mammogram has been ordered. Her EpiPen has been reordered as well. She is follow-up with me in 3-4 months, sooner if needed.    Aleena was seen today for foot problem.    Diagnoses and all orders for this visit:    Right hand pain  -     naproxen (NAPROSYN) 500 MG Tab; Take 1 Tab by mouth 2 times a day, with meals.    Plantar fasciitis  -     REFERRAL TO PHYSICAL THERAPY Reason for Therapy: Eval/Treat/Report  -     naproxen " (NAPROSYN) 500 MG Tab; Take 1 Tab by mouth 2 times a day, with meals.    Anaphylactic shock due to food, subsequent encounter  -     EPINEPHrine (EPIPEN) 0.3 MG/0.3ML Solution Auto-injector solution for injection; 0.3 mL by Intramuscular route Once for 1 dose.    Controlled type 2 diabetes mellitus with diabetic nephropathy, without long-term current use of insulin (CMS-[facility])    Hypothyroidism due to Hashimoto's thyroiditis    Obesity (BMI 30-39.9)  -     Patient identified as having weight management issue.  Appropriate orders and counseling given.    Screening for breast cancer  -     MA-SCREEN MAMMO W/CAD-BILAT; Future          Please note that this dictation was created using voice recognition software. I have made every reasonable attempt to correct obvious errors, but I expect that there are errors of grammar and possibly content that I did not discover before finalizing the note.

## 2018-04-10 NOTE — ASSESSMENT & PLAN NOTE
This is a chronic condition, uncontrolled. Patient's weight is 197 pounds, BMI is 31.8%. She does continue to work on this and has increased her physical activity. We will reassess at her follow-up appointment.

## 2018-05-04 ENCOUNTER — APPOINTMENT (OUTPATIENT)
Dept: ENDOCRINOLOGY | Facility: MEDICAL CENTER | Age: 43
End: 2018-05-04
Payer: OTHER GOVERNMENT

## 2018-05-25 ENCOUNTER — HOSPITAL ENCOUNTER (OUTPATIENT)
Dept: RADIOLOGY | Facility: MEDICAL CENTER | Age: 43
End: 2018-05-25
Attending: NURSE PRACTITIONER
Payer: COMMERCIAL

## 2018-05-25 DIAGNOSIS — Z12.39 SCREENING FOR BREAST CANCER: ICD-10-CM

## 2018-05-25 PROCEDURE — 77067 SCR MAMMO BI INCL CAD: CPT

## 2018-06-14 ENCOUNTER — OCCUPATIONAL MEDICINE (OUTPATIENT)
Dept: URGENT CARE | Facility: PHYSICIAN GROUP | Age: 43
End: 2018-06-14

## 2018-06-14 VITALS
OXYGEN SATURATION: 98 % | DIASTOLIC BLOOD PRESSURE: 70 MMHG | HEART RATE: 74 BPM | HEIGHT: 66 IN | TEMPERATURE: 98.4 F | WEIGHT: 195 LBS | SYSTOLIC BLOOD PRESSURE: 110 MMHG | BODY MASS INDEX: 31.34 KG/M2 | RESPIRATION RATE: 16 BRPM

## 2018-06-14 DIAGNOSIS — Z02.89 ENCOUNTER FOR PHYSICAL EXAMINATION RELATED TO EMPLOYMENT: ICD-10-CM

## 2018-06-14 PROCEDURE — 8915 PR COMPREHENSIVE PHYSICAL: Performed by: FAMILY MEDICINE

## 2018-06-14 NOTE — PROGRESS NOTES
Chief Complaint:    Chief Complaint   Patient presents with   • Employment Physical     Px       History of Present Illness:    Here for employment exam for [employer].       Review of Systems:    Constitutional: Negative for fever, chills, and diaphoresis.   Eyes: Negative for change in vision, photophobia, pain, redness, and discharge.  ENT: Negative for ear pain, ear discharge, hearing loss, tinnitus, nasal congestion, nosebleeds, and sore throat.    Respiratory: Negative for cough, hemoptysis, sputum production, shortness of breath, wheezing, and stridor.    Cardiovascular: Negative for chest pain, palpitations, orthopnea, claudication, leg swelling, and PND.   Gastrointestinal: Negative for abdominal pain, nausea, vomiting, diarrhea, constipation, blood in stool, and melena.   Genitourinary: Negative for dysuria, urinary urgency, urinary frequency, hematuria, and flank pain.   Musculoskeletal: Negative for myalgias, joint pain, neck pain, and back pain.   Skin: Negative for rash and itching.   Neurological: Negative for dizziness, tingling, tremors, sensory change, speech change, focal weakness, seizures, loss of consciousness, and headaches.   Endo: Diabetic and Hypothyroid, on medication.  Heme: Does not bruise/bleed easily.   Psychiatric/Behavioral: Negative for depression, suicidal ideas, hallucinations, memory loss and substance abuse. The patient is not nervous/anxious and does not have insomnia.        Past Medical History:    Past Medical History:   Diagnosis Date   • Diabetes (HCC)    • Thyroid disease      Past Surgical History:    Past Surgical History:   Procedure Laterality Date   • APPENDECTOMY     • CHOLECYSTECTOMY     • PRIMARY C SECTION       Social History:    Social History     Social History   • Marital status: Unknown     Spouse name: N/A   • Number of children: N/A   • Years of education: N/A     Occupational History   • Not on file.     Social History Main Topics   • Smoking status:  "Former Smoker     Quit date: 5/6/2013   • Smokeless tobacco: Never Used   • Alcohol use Yes      Comment: rare   • Drug use: No   • Sexual activity: Yes     Partners: Male     Birth control/ protection: IUD     Other Topics Concern   • Not on file     Social History Narrative   • No narrative on file     Family History:    Family History   Problem Relation Age of Onset   • Hypertension Mother    • Hypertension Father      Medications:    Current Outpatient Prescriptions on File Prior to Visit   Medication Sig Dispense Refill   • VICTOZA 18 MG/3ML Solution Pen-injector injection Inject 0.3 mL as instructed every day. 3 PEN 0   • Insulin Degludec (TRESIBA FLEXTOUCH) 200 UNIT/ML Solution Pen-injector Inject 50 Units as instructed every bedtime. 3 PEN 0   • glucose blood (ONETOUCH VERIO) strip 1 Strip by Other route 3 times a day before meals. 100 Strip 1   • levothyroxine (SYNTHROID) 200 MCG Tab Take 1 Tab by mouth Every morning on an empty stomach. 30 Tab 11   • pantoprazole (PROTONIX) 40 MG Tablet Delayed Response Take 40 mg by mouth every day.     • Cholecalciferol (VITAMIN D3) 23167 UNITS Cap Take 1 Cap by mouth every 7 days. 16 Cap 0   • naproxen (NAPROSYN) 500 MG Tab Take 1 Tab by mouth 2 times a day, with meals. 60 Tab 3     No current facility-administered medications on file prior to visit.      Allergies:    Allergies   Allergen Reactions   • Bee    • Codeine    • Influenza Vaccines Swelling     Arm became swollen and red   • Ivp [Iodine]    • Other Food      Nuts   • Penicillins    • Tape        Vitals:    Vitals:    06/14/18 1005   BP: 110/70   Pulse: 74   Resp: 16   Temp: 36.9 °C (98.4 °F)   SpO2: 98%   Weight: 88.5 kg (195 lb)   Height: 1.676 m (5' 6\")     Vision: Right 20/25, Left 20/20, Both 20/20 with glasses.    Able to hear forced whisper in each ear at 5 feet.      Physical Exam:    Constitutional: Vital signs reviewed. Appears well-developed and well-nourished. No acute distress.   Eyes: Sclera " white, conjunctivae clear. PERRLA.  ENT: External ears normal. External auditory canals normal without discharge. TMs translucent and non-bulging. Hearing normal. Nasal mucosa pink. Lips/teeth are normal. Oral mucosa pink and moist. Posterior pharynx: WNL.  Neck: Neck supple.   Cardiovascular: Regular rate and rhythm. No murmur. No edema. No varicosities. Peripheral pulses 2+.  Pulmonary/Chest: Respirations non-labored. Clear to auscultation bilaterally.  Abdomen: Bowel sounds are normal active. Soft, non-distended, and non-tender to palpation. No hepatosplenomegaly.   Lymph: Cervical nodes without tenderness or enlargement.  Musculoskeletal: Normal gait. Normal range of motion. No tenderness to palpation. No muscular atrophy or weakness.  Neurological: Alert and oriented to person, place, and time. CN 2-12 intact. Muscle tone normal. Coordination normal. Light touch and sensation normal. Reflexes 2+.  Skin: No rashes or lesions. Warm, dry, normal turgor.  Psychiatric: Normal mood and affect. Behavior is normal. Judgment and thought content normal.       Assessment / Plan:    1. Encounter for physical examination related to employment      Patient appears to be in a state of good health and is physically able to perform essential functions of the job title above without accommodations.    Employee is physically able to perform essential functions of job defined at time of evaluation without accommodations as it pertains to their respiratory status.    Patient appears to be free from any other contagious disease.    Form completed.

## 2018-07-27 ENCOUNTER — OFFICE VISIT (OUTPATIENT)
Dept: URGENT CARE | Facility: PHYSICIAN GROUP | Age: 43
End: 2018-07-27
Payer: COMMERCIAL

## 2018-07-27 VITALS
OXYGEN SATURATION: 94 % | SYSTOLIC BLOOD PRESSURE: 110 MMHG | DIASTOLIC BLOOD PRESSURE: 76 MMHG | WEIGHT: 197 LBS | BODY MASS INDEX: 31.66 KG/M2 | HEART RATE: 76 BPM | HEIGHT: 66 IN | RESPIRATION RATE: 16 BRPM | TEMPERATURE: 97.4 F

## 2018-07-27 DIAGNOSIS — J22 LRTI (LOWER RESPIRATORY TRACT INFECTION): ICD-10-CM

## 2018-07-27 PROCEDURE — 99214 OFFICE O/P EST MOD 30 MIN: CPT | Performed by: PHYSICIAN ASSISTANT

## 2018-07-27 RX ORDER — METHYLPREDNISOLONE 4 MG/1
TABLET ORAL
Qty: 21 TAB | Refills: 0 | Status: SHIPPED | OUTPATIENT
Start: 2018-07-27 | End: 2018-09-17

## 2018-07-27 RX ORDER — DOXYCYCLINE HYCLATE 100 MG
100 TABLET ORAL 2 TIMES DAILY
Qty: 14 TAB | Refills: 0 | Status: SHIPPED | OUTPATIENT
Start: 2018-07-27 | End: 2018-08-03

## 2018-07-27 ASSESSMENT — COPD QUESTIONNAIRES: COPD: 0

## 2018-07-27 ASSESSMENT — ENCOUNTER SYMPTOMS
VOMITING: 0
HEADACHES: 0
SINUS PAIN: 0
SPUTUM PRODUCTION: 1
SHORTNESS OF BREATH: 0
CHILLS: 0
RHINORRHEA: 0
PALPITATIONS: 0
SORE THROAT: 0
FEVER: 0
MYALGIAS: 0
WHEEZING: 0
NAUSEA: 0
COUGH: 1

## 2018-07-28 NOTE — PROGRESS NOTES
Pts appt has been rescheduled as new pt with Dr. Kelley on the 18th. Pt stated she is okay with the rescheduled but advised if symptoms worsen before then (anxiety) to contact our office. Pt demonstrated verbal understanding of information and had no further questions or concerns at this time.      Subjective:      Aleena Diana is a 43 y.o. female who presents with Cough (thick mucus x1wk)            Cough   This is a new problem. Episode onset: 1 week  The problem has been gradually worsening. The cough is productive of purulent sputum (green sputum). Pertinent negatives include no chest pain, chills, ear congestion, ear pain, fever, headaches, myalgias, nasal congestion, postnasal drip, rhinorrhea, sore throat, shortness of breath or wheezing. Associated symptoms comments: Fatigue.. Risk factors: exposure to possible mold. She has tried OTC cough suppressant for the symptoms. The treatment provided mild relief. There is no history of asthma, bronchitis, COPD or pneumonia.     Past Medical History:   Diagnosis Date   • Diabetes (HCC)    • Thyroid disease        Past Surgical History:   Procedure Laterality Date   • APPENDECTOMY     • CHOLECYSTECTOMY     • PRIMARY C SECTION         Family History   Problem Relation Age of Onset   • Hypertension Mother    • Hypertension Father        Allergies   Allergen Reactions   • Bee    • Codeine    • Influenza Vaccines Swelling     Arm became swollen and red   • Ivp [Iodine]    • Other Food      Nuts   • Penicillins    • Tape        Medications, Allergies, and current problem list reviewed today in Epic      Review of Systems   Constitutional: Negative for chills, fever and malaise/fatigue.   HENT: Negative for congestion, ear discharge, ear pain, postnasal drip, rhinorrhea, sinus pain and sore throat.    Respiratory: Positive for cough and sputum production. Negative for shortness of breath and wheezing.    Cardiovascular: Negative for chest pain, palpitations and leg swelling.   Gastrointestinal: Negative for nausea and vomiting.   Musculoskeletal: Negative for myalgias.   Neurological: Negative for headaches.     All other systems reviewed and are negative.          Objective:     /76   Pulse 76   Temp 36.3 °C (97.4 °F)   Resp 16   Ht 1.676 m (5'  "6\")   Wt 89.4 kg (197 lb)   SpO2 94%   BMI 31.80 kg/m²      Physical Exam   Constitutional: She is oriented to person, place, and time. She appears well-developed and well-nourished. No distress.   HENT:   Head: Normocephalic and atraumatic.   Right Ear: External ear normal.   Left Ear: External ear normal.   Nose: Nose normal.   Mouth/Throat: Oropharynx is clear and moist. No oropharyngeal exudate.   Neck: Neck supple.   Cardiovascular: Normal rate, regular rhythm and normal heart sounds.  Exam reveals no gallop and no friction rub.    No murmur heard.  Pulmonary/Chest: Effort normal. No respiratory distress. She has no wheezes. She has no rales.   Mild wheezes    Lymphadenopathy:     She has no cervical adenopathy.   Neurological: She is alert and oriented to person, place, and time. No cranial nerve deficit.   Skin: Skin is warm and dry.   Psychiatric: She has a normal mood and affect. Her behavior is normal. Judgment and thought content normal.               Assessment/Plan:     1. LRTI (lower respiratory tract infection)    - doxycycline (VIBRAMYCIN) 100 MG Tab; Take 1 Tab by mouth 2 times a day for 7 days.  Dispense: 14 Tab; Refill: 0  - MethylPREDNISolone (MEDROL DOSEPAK) 4 MG Tablet Therapy Pack; Take as directed on package. 1 pack.  Dispense: 21 Tab; Refill: 0    Monitor blood glucose. Informed patient Medrol can elevate blood sugars. Patient reports her DM is very well controlled.     Differential diagnoses, Supportive care, and indications for immediate follow-up discussed with patient.   Instructed to return to clinic or nearest emergency department for any change in condition, further concerns, or worsening of symptoms.    The patient demonstrated a good understanding and agreed with the treatment plan.    Alana Chavarria P.A.-C.      "

## 2018-08-30 ENCOUNTER — OFFICE VISIT (OUTPATIENT)
Dept: URGENT CARE | Facility: PHYSICIAN GROUP | Age: 43
End: 2018-08-30
Payer: COMMERCIAL

## 2018-08-30 VITALS
SYSTOLIC BLOOD PRESSURE: 112 MMHG | WEIGHT: 194 LBS | RESPIRATION RATE: 16 BRPM | TEMPERATURE: 99 F | HEART RATE: 80 BPM | DIASTOLIC BLOOD PRESSURE: 78 MMHG | BODY MASS INDEX: 31.18 KG/M2 | HEIGHT: 66 IN | OXYGEN SATURATION: 98 %

## 2018-08-30 DIAGNOSIS — R11.2 NON-INTRACTABLE VOMITING WITH NAUSEA, UNSPECIFIED VOMITING TYPE: ICD-10-CM

## 2018-08-30 PROCEDURE — 99214 OFFICE O/P EST MOD 30 MIN: CPT | Performed by: NURSE PRACTITIONER

## 2018-08-30 RX ORDER — ONDANSETRON 4 MG/1
4 TABLET, ORALLY DISINTEGRATING ORAL EVERY 6 HOURS PRN
Qty: 10 TAB | Refills: 0 | Status: SHIPPED | OUTPATIENT
Start: 2018-08-30 | End: 2019-11-29

## 2018-08-30 RX ORDER — ONDANSETRON 4 MG/1
4 TABLET, ORALLY DISINTEGRATING ORAL ONCE
Status: COMPLETED | OUTPATIENT
Start: 2018-08-30 | End: 2018-08-30

## 2018-08-30 RX ADMIN — ONDANSETRON 4 MG: 4 TABLET, ORALLY DISINTEGRATING ORAL at 12:03

## 2018-08-30 ASSESSMENT — ENCOUNTER SYMPTOMS
PALPITATIONS: 0
MYALGIAS: 0
ORTHOPNEA: 0
WEAKNESS: 0
NAUSEA: 1
VOMITING: 1
FEVER: 0
DIZZINESS: 0
ABDOMINAL PAIN: 0
HEADACHES: 0
SHORTNESS OF BREATH: 0
CHILLS: 0

## 2018-08-30 NOTE — PROGRESS NOTES
"Subjective:      Aleena Diana is a 43 y.o. female who presents with Nausea (thinks its from her Victoza); Emesis; and Headache            HPI  Aleena is here for n/v from her higher dose of Victoza. States has ha dthis episode of n/v once she moves to a higher dose of her Victoza. States \"I am 2 clicks about the 1.2 dose\". Has experiences n/v and abdominal pain with this med before last year and had stopped this medication. Had restarted this med this year under endocrinology direction. Has been at her present dose x 1 week. States blood sugar this morning was \"122\". Denies dysuria, CP, SOB but does have HA before decreased water intake.    PMH:  has a past medical history of Diabetes (HCC) and Thyroid disease.  MEDS:   Current Outpatient Prescriptions:   •  ondansetron (ZOFRAN ODT) 4 MG TABLET DISPERSIBLE, Take 1 Tab by mouth every 6 hours as needed for Nausea., Disp: 10 Tab, Rfl: 0  •  VICTOZA 18 MG/3ML Solution Pen-injector injection, Inject 0.3 mL as instructed every day., Disp: 3 PEN, Rfl: 0  •  glucose blood (ONETOUCH VERIO) strip, 1 Strip by Other route 3 times a day before meals., Disp: 100 Strip, Rfl: 1  •  levothyroxine (SYNTHROID) 200 MCG Tab, Take 1 Tab by mouth Every morning on an empty stomach., Disp: 30 Tab, Rfl: 11  •  Cholecalciferol (VITAMIN D3) 37595 UNITS Cap, Take 1 Cap by mouth every 7 days., Disp: 16 Cap, Rfl: 0  •  MethylPREDNISolone (MEDROL DOSEPAK) 4 MG Tablet Therapy Pack, Take as directed on package. 1 pack., Disp: 21 Tab, Rfl: 0  •  naproxen (NAPROSYN) 500 MG Tab, Take 1 Tab by mouth 2 times a day, with meals., Disp: 60 Tab, Rfl: 3  •  Insulin Degludec (TRESIBA FLEXTOUCH) 200 UNIT/ML Solution Pen-injector, Inject 50 Units as instructed every bedtime., Disp: 3 PEN, Rfl: 0  •  pantoprazole (PROTONIX) 40 MG Tablet Delayed Response, Take 40 mg by mouth every day., Disp: , Rfl:   ALLERGIES:   Allergies   Allergen Reactions   • Bee    • Codeine    • Influenza Vaccines Swelling     " "Arm became swollen and red   • Ivp [Iodine]    • Other Food      Nuts   • Penicillins    • Tape      SURGHX:   Past Surgical History:   Procedure Laterality Date   • APPENDECTOMY     • CHOLECYSTECTOMY     • PRIMARY C SECTION       SOCHX:  reports that she quit smoking about 5 years ago. She has never used smokeless tobacco. She reports that she drinks alcohol. She reports that she does not use drugs.  FH: Family history was reviewed, no pertinent findings to report    Review of Systems   Constitutional: Negative for chills, fever and malaise/fatigue.   Respiratory: Negative for shortness of breath.    Cardiovascular: Negative for chest pain, palpitations and orthopnea.   Gastrointestinal: Positive for nausea and vomiting. Negative for abdominal pain.   Musculoskeletal: Negative for myalgias.   Neurological: Negative for dizziness, weakness and headaches.   All other systems reviewed and are negative.         Objective:     /78   Pulse 80   Temp 37.2 °C (99 °F)   Resp 16   Ht 1.676 m (5' 6\")   Wt 88 kg (194 lb)   SpO2 98%   BMI 31.31 kg/m²      Physical Exam   Constitutional: She is oriented to person, place, and time. Vital signs are normal. She appears well-developed and well-nourished. She is active and cooperative.  Non-toxic appearance. She does not have a sickly appearance. She does not appear ill. No distress.   HENT:   Head: Normocephalic.   Eyes: Pupils are equal, round, and reactive to light. Conjunctivae and EOM are normal.   Neck: Normal range of motion. Neck supple.   Cardiovascular: Normal rate and regular rhythm.    Pulmonary/Chest: Effort normal and breath sounds normal.   Abdominal: Soft. Bowel sounds are normal. She exhibits no distension, no fluid wave and no ascites. There is no hepatosplenomegaly. There is no tenderness. There is no rigidity, no rebound, no guarding, no CVA tenderness, no tenderness at McBurney's point and negative Lucero's sign. No hernia.   Musculoskeletal: Normal " range of motion.   Neurological: She is alert and oriented to person, place, and time. She has normal strength. No cranial nerve deficit or sensory deficit. Coordination and gait normal. GCS eye subscore is 4. GCS verbal subscore is 5. GCS motor subscore is 6.   Skin: Skin is warm and dry. She is not diaphoretic.   Psychiatric: She has a normal mood and affect. Her speech is normal and behavior is normal. Judgment and thought content normal. She is not actively hallucinating. Cognition and memory are normal. She is attentive.   Vitals reviewed.              Assessment/Plan:     1. Non-intractable vomiting with nausea, unspecified vomiting type    - ondansetron (ZOFRAN ODT) 4 MG TABLET DISPERSIBLE; Take 1 Tab by mouth every 6 hours as needed for Nausea.  Dispense: 10 Tab; Refill: 0  - ondansetron (ZOFRAN ODT) dispertab 4 mg; Take 1 Tab by mouth Once.    Maintain water status slowly with sips of water and electrolyte fluid, increase to larger amounts as tolerated  Introduce solid foods when vomiting ceases. Eat items from the BRAT diet- trying small amount with one item at a time  Monitor for fever, increased abdominal pain, n/v, lethargy, continued diarrhea- need re-evaluation  Get rest  May use Ibuprofen/Tylenol prn for fever  Monitor for consistent fever >101 with treatment, increase in cough with SOB or labored breathing- need re-evaluation  Follow up with Endocrinology

## 2018-09-17 ENCOUNTER — OFFICE VISIT (OUTPATIENT)
Dept: ENDOCRINOLOGY | Facility: MEDICAL CENTER | Age: 43
End: 2018-09-17
Payer: COMMERCIAL

## 2018-09-17 VITALS
DIASTOLIC BLOOD PRESSURE: 70 MMHG | OXYGEN SATURATION: 99 % | BODY MASS INDEX: 31.79 KG/M2 | HEIGHT: 66 IN | HEART RATE: 87 BPM | WEIGHT: 197.8 LBS | SYSTOLIC BLOOD PRESSURE: 112 MMHG

## 2018-09-17 DIAGNOSIS — E11.21 CONTROLLED TYPE 2 DIABETES MELLITUS WITH DIABETIC NEPHROPATHY, WITHOUT LONG-TERM CURRENT USE OF INSULIN (HCC): ICD-10-CM

## 2018-09-17 LAB
HBA1C MFR BLD: 8 % (ref ?–5.8)
INT CON NEG: NORMAL
INT CON POS: NORMAL

## 2018-09-17 PROCEDURE — 83036 HEMOGLOBIN GLYCOSYLATED A1C: CPT | Performed by: PHYSICIAN ASSISTANT

## 2018-09-17 PROCEDURE — 99214 OFFICE O/P EST MOD 30 MIN: CPT | Performed by: PHYSICIAN ASSISTANT

## 2018-09-17 NOTE — PATIENT INSTRUCTIONS
start on   1.  Victoza 1.2 at night  (stopped)  2.  Tresiba 12 units a night.  (INCREASE to 18)  3.  Ozempic (9 clicks) for two weeks then increase to 0.25

## 2018-09-17 NOTE — PROGRESS NOTES
Return to office Patient Consult Note  Referred by: OSKAR Alonso    Reason for consult: Diabetes Management Type 2    HPI:  Aleena Diana is a 43 y.o. old patient who is seeing us today for diabetes care.  This is a pleasant patient with diabetes and I appreciate the opportunity to participate in the care of this patient.    BG Diary:9/17/2018  In the AM: no log today    BG Diary:3/9/2018  In the AM: 127, 130, 126, 121, 139  Before Bed: 146, 99, 101, 142, 123, 173      BG Diary:2/2/2018  In the AM: 146, 157, 147, 153, 151  Before Bed: 206, 209, 261, 223, 201, 196      Labs of 1/15/18 TSH 2.1  Labs of 12/17/18 c-peptide 4.1, DONNA-65 >250.00, TPO 3044  Labs of 11/8/17 GFR >60, HbA1c is 7.1,      BG Diary:11/17/2017  In the AM: no log     Has been Diabetic since 2013 and gestational diabetes.      Weight: today 197 pounds      1. Controlled type 2 diabetes mellitus with diabetic nephropathy, without long-term current use of insulin (HCC)    This is a new patient with me on 11/17/17  She was on:  1.   Nothing  Failed Metformin, SGLT2 (yeast infection), Trulicity Victoza (nausea)      She agreed to start on   1.  Victoza 1.2 at night (severe stomach aches)  2.  Tresiba 8 units a night.       2.  Hypothyroidism:  1.  Synthroid 200mcg.         ROS:   Constitutional: No night sweats.  Eyes:  No visual changes.  Cardiac: No chest pain, No palpitations or racing heart rate.  Resp: No shortness of breath, No cough,   Gi: No Diarrhea    All other systems were reviewed and were/are negative.  The ROS was revised/revisited during this office visit from the patients first office visit with me on 11/17/17 Please review the full ROS during the first office visit.    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   • Hypothyroidism due to Hashimoto's thyroiditis 10/06/2016   • Obesity (BMI 30-39.9) 10/06/2016   • Seasonal allergic rhinitis 10/06/2016   • Erythema of lower extremity 10/06/2016   • Mild intermittent asthma without complication 10/06/2016   • Diabetes type 2, controlled (HCC)        Past Surgical History:  Past Surgical History:   Procedure Laterality Date   • APPENDECTOMY     • CHOLECYSTECTOMY     • PRIMARY C SECTION         Allergies:  Bee; Codeine; Influenza vaccines; Ivp [iodine]; Other food; Penicillins; and Tape    Social History:  Social History     Social History   • Marital status:      Spouse name: N/A   • Number of children: N/A   • Years of education: N/A     Occupational History   • Not on file.     Social History Main Topics   • Smoking status: Former Smoker     Quit date: 5/6/2013   • Smokeless tobacco: Never Used   • Alcohol use Yes      Comment: rare   • Drug use: No   • Sexual activity: Yes     Partners: Male     Birth control/ protection: IUD     Other Topics Concern   • Not on file     Social History Narrative   • No narrative on file       Family History:  Family History   Problem Relation Age of Onset   • Hypertension Mother    • Hypertension Father        Medications:    Current Outpatient Prescriptions:   •  Insulin Degludec (TRESIBA FLEXTOUCH) 200 UNIT/ML Solution Pen-injector, Inject 50 Units as instructed every bedtime., Disp: 3 PEN, Rfl: 0  •  glucose blood (ONETOUCH VERIO) strip, 1 Strip by Other route 3 times a day before meals., Disp: 100 Strip, Rfl: 1  •  levothyroxine (SYNTHROID) 200 MCG Tab, Take 1 Tab by mouth Every morning on an empty stomach., Disp: 30 Tab, Rfl: 11  •  Cholecalciferol (VITAMIN D3) 36508 UNITS Cap, Take 1 Cap by mouth every 7 days., Disp: 16 Cap, Rfl: 0  •  ondansetron (ZOFRAN ODT) 4 MG TABLET DISPERSIBLE, Take  "1 Tab by mouth every 6 hours as needed for Nausea., Disp: 10 Tab, Rfl: 0        Physical Examination:   Vital signs: /70   Pulse 87   Ht 1.676 m (5' 5.98\")   Wt 89.7 kg (197 lb 12.8 oz)   SpO2 99%   BMI 31.94 kg/m²   General: No distress, cooperative, well dressed and well nourished.   Eyes: No scleral icterus or discharge, No hyposphagma  ENMT: Normal on external inspection of nose, lips, No nasal drainage   Neck: No abnormal masses on inspection  Resp: Normal effort, Bilateral clear to auscultation, No wheezing, No rales  CVS: Regular rate and rhythm, S1 S2 normal, No murmur. No gallop  Extremities: No edema bilateral extremities  Neuro: Alert and oriented  Skin: No rash, No Ulcers  Psych: Normal mood and affect      Assessment and Plan:    1. Controlled type 2 diabetes mellitus with diabetic nephropathy, without long-term current use of insulin (HCC)    start on   1.  Victoza 1.2 at night  (stopped)  2.  Tresiba 12 units a night.  (INCREASE to 18)  3.  Ozempic (9 clicks) for two weeks then increase to 0.25    Return in about 1 month (around 10/17/2018).    Blood glucose log: Check BG in the morning when wake up, before lunch or dinner and before bed.  So three times a day.  Always bring BG diary to the next office visit.     This patient during there office visit today was started on a new medication Ozempic.  Side effects of the new medication were discussed with the patient today in the office.       Thank you kindly for allowing me to participate in the diabetes care plan for this patient.    Josse Escalera PA-C, BC-ADM  Board Certified - Advanced Diabetes Management  09/17/18    CC:   Karley Mukherjee, A.P.R.NBrandon    "

## 2018-09-17 NOTE — LETTER
[facility]  OSKAR Alonso  [address] [place] [address]  Fax: [phone]   Authorization for Release/Disclosure of   Protected Health Information   Name: CHARISSE DIANA : 1975 SSN: [id number]   Address: [address] [place] [address] [place] [address] Phone:    [phone] (home)    I authorize the entity listed below to release/disclose the PHI below to:   [facility]/OSKAR Alonso and Josse Escalera P.A.-C.   Provider or Entity Name:  [facility]    Address   City, State, Zip   Phone:      Fax:     Reason for request: continuity of care   Information to be released:    [  ] LAST COLONOSCOPY,  including any PATH REPORT and follow-up  [  ] LAST FIT/COLOGUARD RESULT [  ] LAST DEXA  [  ] LAST MAMMOGRAM  [  ] LAST PAP  [  ] LAST LABS [X ] RETINA EXAM REPORT  [  ] IMMUNIZATION RECORDS  [  ] Release all info      [  ] Check here and initial the line next to each item to release ALL health information INCLUDING  _____ Care and treatment for drug and / or alcohol abuse  _____ HIV testing, infection status, or AIDS  _____ Genetic Testing    DATES OF SERVICE OR TIME PERIOD TO BE DISCLOSED: _____________  I understand and acknowledge that:  * This Authorization may be revoked at any time by you in writing, except if your health information has already been used or disclosed.  * Your health information that will be used or disclosed as a result of you signing this authorization could be re-disclosed by the recipient. If this occurs, your re-disclosed health information may no longer be protected by State or Federal laws.  * You may refuse to sign this Authorization. Your refusal will not affect your ability to obtain treatment.  * This Authorization becomes effective upon signing and will  on (date) __________.      If no date is indicated, this Authorization will  one (1) year from the signature date.    Name: Charisse Diana    Signature:   Date:       9/17/2018       PLEASE FAX REQUESTED RECORDS BACK TO: [phone]

## 2018-09-24 DIAGNOSIS — E11.9 TYPE 2 DIABETES MELLITUS WITHOUT COMPLICATION, WITH LONG-TERM CURRENT USE OF INSULIN (HCC): ICD-10-CM

## 2018-09-24 DIAGNOSIS — Z79.4 TYPE 2 DIABETES MELLITUS WITHOUT COMPLICATION, WITH LONG-TERM CURRENT USE OF INSULIN (HCC): ICD-10-CM

## 2018-09-28 DIAGNOSIS — E11.9 TYPE 2 DIABETES MELLITUS WITHOUT COMPLICATION, WITH LONG-TERM CURRENT USE OF INSULIN (HCC): ICD-10-CM

## 2018-09-28 DIAGNOSIS — Z79.4 TYPE 2 DIABETES MELLITUS WITHOUT COMPLICATION, WITH LONG-TERM CURRENT USE OF INSULIN (HCC): ICD-10-CM

## 2018-10-12 ENCOUNTER — OFFICE VISIT (OUTPATIENT)
Dept: ENDOCRINOLOGY | Facility: MEDICAL CENTER | Age: 43
End: 2018-10-12
Payer: COMMERCIAL

## 2018-10-12 VITALS
DIASTOLIC BLOOD PRESSURE: 80 MMHG | BODY MASS INDEX: 31.98 KG/M2 | HEIGHT: 66 IN | RESPIRATION RATE: 16 BRPM | HEART RATE: 110 BPM | SYSTOLIC BLOOD PRESSURE: 110 MMHG | WEIGHT: 199 LBS | OXYGEN SATURATION: 96 %

## 2018-10-12 DIAGNOSIS — E11.21 CONTROLLED TYPE 2 DIABETES MELLITUS WITH DIABETIC NEPHROPATHY, UNSPECIFIED WHETHER LONG TERM INSULIN USE (HCC): ICD-10-CM

## 2018-10-12 PROCEDURE — 99214 OFFICE O/P EST MOD 30 MIN: CPT | Performed by: PHYSICIAN ASSISTANT

## 2018-10-12 NOTE — PATIENT INSTRUCTIONS
start on   1.  Victoza 1.2 at night  (stopped with nausea/diarrhea)  2.  Tresiba 18 units a night.  (INCREASE to 24)  3.  Ozempic (9 clicks) once a week  4.  Start Novolog 6 units at dinner

## 2018-10-12 NOTE — LETTER
Request for Medical Records    Patient Name: Aleena Diana    : 1975      Dear Doctor: Cruz Reilly in [place]    The above named patient receives primary care at the [facility] by OSKAR Alonso.  The patient informs us that you are her eye care Provider.    Please fax a copy of the most recent eye exam to [phone] or answer the  questions below and fax this sheet back to us at the above number.  Attached is a signed Release of Information.      Date of last eye exam: _____________    [facility] eye exam summary:        Please select the choice(s) that apply.    ____ No diabetic retinopathy    ____    Diabetic retinopathy present      Printed Name and Credentials: __________________________________    Signature of Eye Care Provider: _________________________________    We appreciate your assistance and collaboration in providing efficient patient care!    Kindest Regards,    [facility]  [address]  [place] [address] [phone]

## 2018-10-12 NOTE — LETTER
[facility]  OSKAR Alonso  [address] [place] [address]  Fax: [phone]   Authorization for Release/Disclosure of   Protected Health Information   Name: CHARISSE COWART : 1975 SSN: [id number]   Address: [address] [place] [address] [address] [place] [address] Phone:    [phone] (home)    I authorize the entity listed below to release/disclose the PHI below to:   [facility]/OSKAR Alonso and Josse Escalera P.A.-C.   Provider or Entity Name:  [facility]   Address   City, State, [facility]  [place] Phone:      Fax:  [phone]   Reason for request: continuity of care   Information to be released:    [  ] LAST COLONOSCOPY,  including any PATH REPORT and follow-up  [  ] LAST FIT/COLOGUARD RESULT [  ] LAST DEXA  [  ] LAST MAMMOGRAM  [  ] LAST PAP  [  ] LAST LABS [ X ] RETINA EXAM REPORT  [  ] IMMUNIZATION RECORDS  [  ] Release all info      [  ] Check here and initial the line next to each item to release ALL health information INCLUDING  _____ Care and treatment for drug and / or alcohol abuse  _____ HIV testing, infection status, or AIDS  _____ Genetic Testing    DATES OF SERVICE OR TIME PERIOD TO BE DISCLOSED: _____________  I understand and acknowledge that:  * This Authorization may be revoked at any time by you in writing, except if your health information has already been used or disclosed.  * Your health information that will be used or disclosed as a result of you signing this authorization could be re-disclosed by the recipient. If this occurs, your re-disclosed health information may no longer be protected by State or Federal laws.  * You may refuse to sign this Authorization. Your refusal will not affect your ability to obtain treatment.  * This Authorization becomes effective upon signing and will  on (date) __________.      If no date is indicated, this Authorization will  one (1) year from the signature date.    Name: Charisse Jewell  [place]    Signature:   Date:     10/12/2018       PLEASE FAX REQUESTED RECORDS BACK TO: [phone]

## 2018-10-12 NOTE — PROGRESS NOTES
Return to office Patient Consult Note  Referred by: OSKAR Alonso    Reason for consult: Diabetes Management Type 2    HPI:  Aleena Diana is a 43 y.o. old patient who is seeing us today for diabetes care.  This is a pleasant patient with diabetes and I appreciate the opportunity to participate in the care of this patient.    Labs of 1/15/18 TSH 2.1  Labs of 12/17/18 c-peptide 4.1, DONNA-65 >250.00, TPO 3044  Labs of 11/8/17 GFR >60, HbA1c is 7.1,     BG Diary:10/12/2018  In the AM: 180, 192, 156, 184, 163, 177, 156  Before Bed: 244, 231, 246, 212, 245, 262, 211     BG Diary:9/17/2018  In the AM: no log today     BG Diary:3/9/2018  In the AM: 127, 130, 126, 121, 139  Before Bed: 146, 99, 101, 142, 123, 173        Has been Diabetic since 2013 and gestational diabetes.       1. Controlled type 2 diabetes mellitus with diabetic nephropathy, unspecified whether long term insulin use (HCC)    This is a new patient with me on 11/17/17  She was on:  1.   Nothing  Failed Metformin, SGLT2 (yeast infection), Trulicity Victoza (nausea)      start on   1.  Victoza 1.2 at night  (stopped with nausea/diarrhea)  2.  Tresiba 18 units a night.  (INCREASE to 18)  3.  Ozempic (9 clicks) for two weeks then increase to 0.25     2.  Hypothyroidism:  1.  Synthroid 200mcg.         ROS:   Constitutional: No night sweats.  Eyes:  No visual changes.  Cardiac: No chest pain, No palpitations or racing heart rate.  Resp: No shortness of breath, No cough,   Gi: No Diarrhea    All other systems were reviewed and were/are negative.  The ROS was revised/revisited during this office visit from the patients first office visit with me on 11/17/17 Please review the full ROS during the first office visit.    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   • Hypothyroidism due to Hashimoto's thyroiditis 10/06/2016   • Obesity (BMI 30-39.9) 10/06/2016   • Seasonal allergic rhinitis 10/06/2016   • Erythema of lower extremity 10/06/2016   • Mild intermittent asthma without complication 10/06/2016   • Diabetes type 2, controlled (HCC)        Past Surgical History:  Past Surgical History:   Procedure Laterality Date   • APPENDECTOMY     • CHOLECYSTECTOMY     • PRIMARY C SECTION         Allergies:  Bee; Codeine; Influenza vaccines; Ivp [iodine]; Other food; Penicillins; and Tape    Social History:  Social History     Social History   • Marital status:      Spouse name: N/A   • Number of children: N/A   • Years of education: N/A     Occupational History   • Not on file.     Social History Main Topics   • Smoking status: Former Smoker     Quit date: 5/6/2013   • Smokeless tobacco: Never Used   • Alcohol use Yes      Comment: rare   • Drug use: No   • Sexual activity: Yes     Partners: Male     Birth control/ protection: IUD     Other Topics Concern   • Not on file     Social History Narrative   • No narrative on file       Family History:  Family History   Problem Relation Age of Onset   • Hypertension Mother    • Hypertension Father        Medications:    Current Outpatient Prescriptions:   •  glucose blood (FREESTYLE LITE) strip, 1 Strip by Other route 3 times a day., Disp: 100 Strip, Rfl: 11  •  Insulin Degludec (TRESIBA FLEXTOUCH) 200 UNIT/ML Solution Pen-injector, Inject 30-60 Units as instructed every bedtime., Disp: 3 PEN, Rfl: 11  •  ondansetron (ZOFRAN ODT) 4 MG TABLET DISPERSIBLE, Take 1 Tab by mouth every 6 hours as needed for Nausea., Disp: 10 Tab, Rfl: 0  •  levothyroxine (SYNTHROID) 200 MCG Tab, Take 1 Tab by mouth Every morning on an empty stomach., Disp: 30 Tab, Rfl: 11  •  Cholecalciferol  "(VITAMIN D3) 87990 UNITS Cap, Take 1 Cap by mouth every 7 days., Disp: 16 Cap, Rfl: 0        Physical Examination:   Vital signs: /80 (BP Location: Left arm, Patient Position: Sitting, BP Cuff Size: Large adult)   Pulse (!) 110   Resp 16   Ht 1.676 m (5' 6\")   Wt 90.3 kg (199 lb)   SpO2 96%   BMI 32.12 kg/m²   General: No distress, cooperative, well dressed and well nourished.   Eyes: No scleral icterus or discharge, No hyposphagma  ENMT: Normal on external inspection of nose, lips, No nasal drainage   Neck: No abnormal masses on inspection  Resp: Normal effort, Bilateral clear to auscultation, No wheezing, No rales  CVS: Regular rate and rhythm, S1 S2 normal, No murmur. No gallop  Extremities: No edema bilateral extremities  Neuro: Alert and oriented  Skin: No rash, No Ulcers  Psych: Normal mood and affect      Assessment and Plan:    1. Controlled type 2 diabetes mellitus with diabetic nephropathy, unspecified whether long term insulin use (HCC)    This is a new patient with me on 11/17/17  She was on:  1.   Nothing  Failed Metformin, SGLT2 (yeast infection), Trulicity Victoza (nausea)      start on   1.  Victoza 1.2 at night  (stopped with nausea/diarrhea)  2.  Tresiba 18 units a night.  (INCREASE to 24)  3.  Ozempic (9 clicks) once a week  4.  Start Novolog 6 units at dinner       2.  Hypothyroidism:  1.  Synthroid 200mcg.         Return in about 3 weeks (around 11/2/2018).    Blood glucose log: Check BG in the morning when wake up, before lunch or dinner and before bed.  So three times a day.  Always bring BG diary to the next office visit.       Thank you kindly for allowing me to participate in the diabetes care plan for this patient.    Josse Escalera PA-C, BC-ADM  Board Certified - [employer] Diabetes Management  10/12/18    CC:   OSKAR Alonso    "

## 2018-11-02 ENCOUNTER — APPOINTMENT (OUTPATIENT)
Dept: ENDOCRINOLOGY | Facility: MEDICAL CENTER | Age: 43
End: 2018-11-02
Payer: OTHER GOVERNMENT

## 2018-11-02 DIAGNOSIS — Z79.4 TYPE 2 DIABETES MELLITUS WITHOUT COMPLICATION, WITH LONG-TERM CURRENT USE OF INSULIN (HCC): ICD-10-CM

## 2018-11-02 DIAGNOSIS — E11.9 TYPE 2 DIABETES MELLITUS WITHOUT COMPLICATION, WITH LONG-TERM CURRENT USE OF INSULIN (HCC): ICD-10-CM

## 2018-11-06 ENCOUNTER — OFFICE VISIT (OUTPATIENT)
Dept: MEDICAL GROUP | Facility: PHYSICIAN GROUP | Age: 43
End: 2018-11-06
Payer: OTHER GOVERNMENT

## 2018-11-06 VITALS
BODY MASS INDEX: 32.3 KG/M2 | DIASTOLIC BLOOD PRESSURE: 68 MMHG | SYSTOLIC BLOOD PRESSURE: 118 MMHG | HEART RATE: 98 BPM | RESPIRATION RATE: 16 BRPM | OXYGEN SATURATION: 98 % | WEIGHT: 201 LBS | HEIGHT: 66 IN | TEMPERATURE: 97.5 F

## 2018-11-06 DIAGNOSIS — E11.21 CONTROLLED TYPE 2 DIABETES MELLITUS WITH DIABETIC NEPHROPATHY, UNSPECIFIED WHETHER LONG TERM INSULIN USE (HCC): ICD-10-CM

## 2018-11-06 DIAGNOSIS — L98.9 SKIN LESION: ICD-10-CM

## 2018-11-06 DIAGNOSIS — E03.8 HYPOTHYROIDISM DUE TO HASHIMOTO'S THYROIDITIS: ICD-10-CM

## 2018-11-06 DIAGNOSIS — E06.3 HYPOTHYROIDISM DUE TO HASHIMOTO'S THYROIDITIS: ICD-10-CM

## 2018-11-06 PROCEDURE — 99214 OFFICE O/P EST MOD 30 MIN: CPT | Performed by: NURSE PRACTITIONER

## 2018-11-06 ASSESSMENT — PATIENT HEALTH QUESTIONNAIRE - PHQ9: CLINICAL INTERPRETATION OF PHQ2 SCORE: 0

## 2018-11-06 NOTE — PROGRESS NOTES
Chief Complaint   Patient presents with   • Skin Lesion     on forehead         This is a 43 y.o.female patient that presents today with the following: skin lesion, T2DM    Hypothyroidism due to Hashimoto's thyroiditis  This is a chronic condition, stable and well controlled with current medications, she is followed by endocrinology and does have an upcoming appointment in a couple weeks.    Diabetes type 2, controlled (CMS-HCC)  This is a chronic condition, suboptimally controlled on current medication.  She tells me that she has been tried on several different medications, none of which she can tolerate due to adverse effects, mostly intestinal upset, abdominal pain and diarrhea.  She is currently on NovoLog and Tresiba, recently restarted on Victoza, but she stopped taking this due to adverse effects.  She was then put on ozempic weekly, she is not tolerating this due to gastrointestinal upset.  She was put on Trulicity and a number of months ago and had a slight reaction to this.  She does not feel that she is having adverse effects to the NovoLog or to the Tresiba.  I did discuss with her that it may be that she will only be able to take insulin if she cannot tolerate any of the newer medications.  She also does not tolerate metformin or sulfonylureas.    Skin lesion  When appointment was originally made, she has skin lesion on her forehead that she was told in the past was a seborrheic keratoses, interestingly this has fallen off over the past week.  However she still wants to see dermatology due to discoloration she has in her left lower extremity, she states that this appeared after having significant bilateral lower extremity edema.  They resemble a very very light port wine stain on the top of her foot as well as the inner aspect of her left lower extremity, which is almost difficult to differentiate from her regular skin.  She was told in the past that this could be removed by laser and would like to  pursue this, referral has been placed.      No visits with results within 1 Month(s) from this visit.   Latest known visit with results is:   Office Visit on 09/17/2018   Component Date Value   • Glycohemoglobin 09/17/2018 8.0    • Internal Control Negative 09/17/2018 Valid    • Internal Control Positive 09/17/2018 Valid          clinical course has been stable    Past Medical History:   Diagnosis Date   • Diabetes (HCC)    • Thyroid disease        Past Surgical History:   Procedure Laterality Date   • APPENDECTOMY     • CHOLECYSTECTOMY     • PRIMARY C SECTION         Family History   Problem Relation Age of Onset   • Hypertension Mother    • Hypertension Father        Bee; Codeine; Influenza vaccines; Ivp [iodine]; Other food; Penicillins; and Tape    Current Outpatient Prescriptions Ordered in [facility]   Medication Sig Dispense Refill   • Insulin Degludec (TRESIBA FLEXTOUCH) 200 UNIT/ML Solution Pen-injector Inject 30-60 Units as instructed every bedtime. 3 PEN 11   • NOVOLOG, insulin aspart, (NOVOLOG FLEXPEN) 100 UNIT/ML Solution Pen-injector injection Inject 5-20 Units as instructed 3 times a day before meals. 5 PEN 6   • glucose blood (FREESTYLE LITE) strip 1 Strip by Other route 3 times a day. 100 Strip 11   • ondansetron (ZOFRAN ODT) 4 MG TABLET DISPERSIBLE Take 1 Tab by mouth every 6 hours as needed for Nausea. 10 Tab 0   • levothyroxine (SYNTHROID) 200 MCG Tab Take 1 Tab by mouth Every morning on an empty stomach. 30 Tab 11   • Cholecalciferol (VITAMIN D3) 79035 UNITS Cap Take 1 Cap by mouth every 7 days. 16 Cap 0     No current Epic-ordered facility-administered medications on file.        Constitutional ROS: No unexpected change in weight, No fatigue, No unexplained fevers, sweats, or chills  Pulmonary ROS: No chronic cough, sputum, or hemoptysis, No shortness of breath, No recent change in breathing  Cardiovascular ROS: No chest pain, No edema, No palpitations  Musculoskeletal/Extremities ROS: No clubbing,  "No peripheral edema, No pain, redness or swelling on the joints  Skin/Integumentary ROS: Positive per HPI  Neurologic ROS: Normal development, No seizures, No weakness  Endocrine ROS: Positive per HPI    Physical exam:  /68 (BP Location: Left arm, Patient Position: Sitting, BP Cuff Size: Adult long)   Pulse 98   Temp 36.4 °C (97.5 °F) (Temporal)   Resp 16   Ht 1.676 m (5' 6\")   Wt 91.2 kg (201 lb)   SpO2 98%   BMI 32.44 kg/m²   General Appearance: Middle-aged female, alert, no distress, obese, well-groomed  Skin: Skin color, texture, turgor normal. No rashes or lesions.  Lungs: negative findings: normal respiratory rate and rhythm, lungs clear to auscultation  Abdomen: Abdomen soft, non-tender. BS normal. No masses,  No organomegaly  Musculoskeletal: negative findings: no evidence of joint instability, strength normal, no deformities present  Neurologic: intact, CN II through XII grossly intact    Medical decision making/discussion: Patient to continue care per specialist including endocrinology.  She was otherwise advised to eat healthy, exercise regularly and continue efforts towards weight loss.  Referral has been placed to dermatology for concerning skin discoloration in her left lower extremity.    Aleena was seen today for skin lesion.    Diagnoses and all orders for this visit:    Skin lesion    Controlled type 2 diabetes mellitus with diabetic nephropathy, unspecified whether long term insulin use (HCC)  -     REFERRAL TO DERMATOLOGY    Hypothyroidism due to Hashimoto's thyroiditis          Please note that this dictation was created using voice recognition software. I have made every reasonable attempt to correct obvious errors, but I expect that there are errors of grammar and possibly content that I did not discover before finalizing the note.        "

## 2018-11-07 NOTE — ASSESSMENT & PLAN NOTE
This is a chronic condition, stable and well controlled with current medications, she is followed by endocrinology and does have an upcoming appointment in a couple weeks.

## 2018-11-07 NOTE — ASSESSMENT & PLAN NOTE
When appointment was originally made, she has skin lesion on her forehead that she was told in the past was a seborrheic keratoses, interestingly this has fallen off over the past week.  However she still wants to see dermatology due to discoloration she has in her left lower extremity, she states that this appeared after having significant bilateral lower extremity edema.  They resemble a very very light port wine stain on the top of her foot as well as the inner aspect of her left lower extremity, which is almost difficult to differentiate from her regular skin.  She was told in the past that this could be removed by laser and would like to pursue this, referral has been placed.

## 2018-11-07 NOTE — ASSESSMENT & PLAN NOTE
This is a chronic condition, suboptimally controlled on current medication.  She tells me that she has been tried on several different medications, none of which she can tolerate due to adverse effects, mostly intestinal upset, abdominal pain and diarrhea.  She is currently on NovoLog and Tresiba, recently restarted on Victoza, but she stopped taking this due to adverse effects.  She was then put on ozempic weekly, she is not tolerating this due to gastrointestinal upset.  She was put on Trulicity and a number of months ago and had a slight reaction to this.  She does not feel that she is having adverse effects to the NovoLog or to the Tresiba.  I did discuss with her that it may be that she will only be able to take insulin if she cannot tolerate any of the newer medications.  She also does not tolerate metformin or sulfonylureas.

## 2018-11-16 ENCOUNTER — APPOINTMENT (OUTPATIENT)
Dept: ENDOCRINOLOGY | Facility: MEDICAL CENTER | Age: 43
End: 2018-11-16
Payer: OTHER GOVERNMENT

## 2018-12-03 RX ORDER — LEVOTHYROXINE SODIUM 200 MCG
TABLET ORAL
Qty: 90 TAB | Refills: 3 | Status: SHIPPED | OUTPATIENT
Start: 2018-12-03 | End: 2022-04-01 | Stop reason: SDUPTHER

## 2018-12-03 NOTE — TELEPHONE ENCOUNTER
Was the patient seen in the last year in this department? Yes    Does patient have an active prescription for medications requested? No     Received Request Via: Pharmacy   SYNTHROID 200 MCG Tab  TAKE 1 TABLET BY MOUTH ONCE DAILY IN THE MORNING ON AN EMPTY STOMACH

## 2018-12-28 DIAGNOSIS — Z11.1 SCREENING-PULMONARY TB: ICD-10-CM

## 2018-12-31 ENCOUNTER — HOSPITAL ENCOUNTER (OUTPATIENT)
Dept: LAB | Facility: MEDICAL CENTER | Age: 43
End: 2018-12-31
Attending: NURSE PRACTITIONER
Payer: OTHER GOVERNMENT

## 2018-12-31 DIAGNOSIS — Z11.1 SCREENING-PULMONARY TB: ICD-10-CM

## 2018-12-31 PROCEDURE — 36415 COLL VENOUS BLD VENIPUNCTURE: CPT

## 2018-12-31 PROCEDURE — 86480 TB TEST CELL IMMUN MEASURE: CPT

## 2019-01-02 LAB
GAMMA INTERFERON BACKGROUND BLD IA-ACNC: 0.04 IU/ML
M TB IFN-G BLD-IMP: NEGATIVE
M TB IFN-G CD4+ BCKGRND COR BLD-ACNC: -0.01 IU/ML
MITOGEN IGNF BCKGRD COR BLD-ACNC: >10 IU/ML
QFT TB2 - NIL TBQ2: -0.01 IU/ML

## 2019-02-23 ENCOUNTER — HOSPITAL ENCOUNTER (EMERGENCY)
Facility: MEDICAL CENTER | Age: 44
End: 2019-02-23
Attending: EMERGENCY MEDICINE
Payer: OTHER GOVERNMENT

## 2019-02-23 VITALS
HEIGHT: 68 IN | HEART RATE: 75 BPM | DIASTOLIC BLOOD PRESSURE: 78 MMHG | OXYGEN SATURATION: 97 % | BODY MASS INDEX: 31.24 KG/M2 | RESPIRATION RATE: 16 BRPM | WEIGHT: 206.13 LBS | SYSTOLIC BLOOD PRESSURE: 159 MMHG | TEMPERATURE: 98.8 F

## 2019-02-23 DIAGNOSIS — J32.9 CHRONIC SINUSITIS, UNSPECIFIED LOCATION: ICD-10-CM

## 2019-02-23 DIAGNOSIS — J34.89 RHINORRHEA: ICD-10-CM

## 2019-02-23 PROCEDURE — 99283 EMERGENCY DEPT VISIT LOW MDM: CPT

## 2019-02-23 ASSESSMENT — LIFESTYLE VARIABLES: DO YOU DRINK ALCOHOL: NO

## 2019-02-24 NOTE — ED PROVIDER NOTES
"ED Provider Note    CHIEF COMPLAINT  Chief Complaint   Patient presents with   • Runny Nose       HPI  Aleena Diana is a 43 y.o. female who presents for evaluation of an episode of rhinorrhea lasting a couple of minutes, the patient was laying down, when she sat up she just had acute onset of a yellow mucus dripping out of her nose and this lasted a couple minutes.  Has since resolved.  No fever, recent respiratory illness that she is just getting over.  The patient states that yesterday she had a massage as well as a visit her chiropractor and she is concerned that this rhinorrhea could represent CSF.  No head injury.    REVIEW OF SYSTEMS  Negative for fever, headache.    PAST MEDICAL HISTORY   has a past medical history of Diabetes (HCC) and Thyroid disease.    SOCIAL HISTORY  Social History     Social History Main Topics   • Smoking status: Former Smoker     Quit date: 5/6/2013   • Smokeless tobacco: Never Used   • Alcohol use Yes      Comment: rare   • Drug use: No   • Sexual activity: Yes     Partners: Male     Birth control/ protection: IUD       SURGICAL HISTORY   has a past surgical history that includes cholecystectomy; appendectomy; and primary c section.    CURRENT MEDICATIONS  I personally reviewed the medication list in the charting documentation.     ALLERGIES  Allergies   Allergen Reactions   • Bee    • Codeine    • Influenza Vaccines Swelling     Arm became swollen and red   • Ivp [Iodine]    • Other Food      Nuts   • Penicillins    • Tape        PHYSICAL EXAM  VITAL SIGNS: BP (!) 163/86   Pulse 81   Temp 37.2 °C (99 °F) (Temporal)   Resp 16   Ht 1.727 m (5' 8\")   Wt 93.5 kg (206 lb 2.1 oz)   SpO2 98%   BMI 31.34 kg/m²   Constitutional: Alert in no apparent distress.  HENT: No signs of trauma.   Eyes: Conjunctiva normal, Non-icteric.   Chest: Normal nonlabored respirations  Skin: No erythema, No rash.   Musculoskeletal: Good range of motion in all major joints.   Neurologic: Alert, " No focal deficits noted.   Psychiatric: Affect normal, Judgment normal.    COURSE & MEDICAL DECISION MAKING  Pertinent Labs & Imaging studies reviewed. (See chart for details)    Encounter Summary: This is a 43 y.o. female with a brief episode of rhinorrhea with yellow mucus, the patient specifically expresses concerns over a CSF rhinorrhea because she had a massage and chiropractic adjustment of her neck yesterday, no head injury, she appears exceedingly well on exam.  I suspect the position which she was laying produce drainage from a single sinus especially in the setting of a recent URI, at this point I do not feel the need for further evaluation here in the emergency department      DISPOSITION: Discharge Home      FINAL IMPRESSION  1. Chronic sinusitis, unspecified location    2. Rhinorrhea        This dictation was created using voice recognition software. The accuracy of the dictation is limited to the abilities of the software. I expect there may be some errors of grammar and possibly content. The nursing notes were reviewed and certain aspects of this information were incorporated into this note.    Electronically signed by: Turner Evans, 2/23/2019 5:24 PM

## 2019-05-30 ENCOUNTER — HOSPITAL ENCOUNTER (EMERGENCY)
Dept: HOSPITAL 8 - ED | Age: 44
Discharge: HOME | End: 2019-05-30
Payer: SELF-PAY

## 2019-05-30 VITALS — BODY MASS INDEX: 31.99 KG/M2 | HEIGHT: 66 IN | WEIGHT: 199.08 LBS

## 2019-05-30 VITALS — DIASTOLIC BLOOD PRESSURE: 69 MMHG | SYSTOLIC BLOOD PRESSURE: 127 MMHG

## 2019-05-30 DIAGNOSIS — Z87.891: ICD-10-CM

## 2019-05-30 DIAGNOSIS — F41.1: Primary | ICD-10-CM

## 2019-05-30 DIAGNOSIS — E11.9: ICD-10-CM

## 2019-05-30 PROCEDURE — 93005 ELECTROCARDIOGRAM TRACING: CPT

## 2019-05-30 PROCEDURE — 99284 EMERGENCY DEPT VISIT MOD MDM: CPT

## 2019-11-29 ENCOUNTER — APPOINTMENT (OUTPATIENT)
Dept: RADIOLOGY | Facility: MEDICAL CENTER | Age: 44
End: 2019-11-29
Attending: EMERGENCY MEDICINE
Payer: COMMERCIAL

## 2019-11-29 ENCOUNTER — HOSPITAL ENCOUNTER (EMERGENCY)
Facility: MEDICAL CENTER | Age: 44
End: 2019-11-29
Attending: EMERGENCY MEDICINE
Payer: COMMERCIAL

## 2019-11-29 VITALS
HEIGHT: 66 IN | BODY MASS INDEX: 30.53 KG/M2 | HEART RATE: 81 BPM | WEIGHT: 190 LBS | RESPIRATION RATE: 16 BRPM | SYSTOLIC BLOOD PRESSURE: 131 MMHG | DIASTOLIC BLOOD PRESSURE: 64 MMHG | TEMPERATURE: 97.5 F | OXYGEN SATURATION: 94 %

## 2019-11-29 DIAGNOSIS — R73.9 HYPERGLYCEMIA: ICD-10-CM

## 2019-11-29 DIAGNOSIS — Z86.39 HISTORY OF DIABETES MELLITUS: ICD-10-CM

## 2019-11-29 DIAGNOSIS — R07.9 CHEST PAIN, UNSPECIFIED TYPE: ICD-10-CM

## 2019-11-29 LAB
ALBUMIN SERPL BCP-MCNC: 4.5 G/DL (ref 3.2–4.9)
ALBUMIN/GLOB SERPL: 1.5 G/DL
ALP SERPL-CCNC: 70 U/L (ref 30–99)
ALT SERPL-CCNC: 44 U/L (ref 2–50)
ANION GAP SERPL CALC-SCNC: 11 MMOL/L (ref 0–11.9)
AST SERPL-CCNC: 30 U/L (ref 12–45)
BASOPHILS # BLD AUTO: 0.3 % (ref 0–1.8)
BASOPHILS # BLD: 0.02 K/UL (ref 0–0.12)
BILIRUB SERPL-MCNC: 0.4 MG/DL (ref 0.1–1.5)
BUN SERPL-MCNC: 12 MG/DL (ref 8–22)
CALCIUM SERPL-MCNC: 9.7 MG/DL (ref 8.5–10.5)
CHLORIDE SERPL-SCNC: 102 MMOL/L (ref 96–112)
CO2 SERPL-SCNC: 23 MMOL/L (ref 20–33)
CREAT SERPL-MCNC: 0.74 MG/DL (ref 0.5–1.4)
EKG IMPRESSION: NORMAL
EOSINOPHIL # BLD AUTO: 0.12 K/UL (ref 0–0.51)
EOSINOPHIL NFR BLD: 1.8 % (ref 0–6.9)
ERYTHROCYTE [DISTWIDTH] IN BLOOD BY AUTOMATED COUNT: 46.4 FL (ref 35.9–50)
GLOBULIN SER CALC-MCNC: 3 G/DL (ref 1.9–3.5)
GLUCOSE SERPL-MCNC: 206 MG/DL (ref 65–99)
HCT VFR BLD AUTO: 43.2 % (ref 37–47)
HGB BLD-MCNC: 14.1 G/DL (ref 12–16)
IMM GRANULOCYTES # BLD AUTO: 0.02 K/UL (ref 0–0.11)
IMM GRANULOCYTES NFR BLD AUTO: 0.3 % (ref 0–0.9)
LYMPHOCYTES # BLD AUTO: 2.63 K/UL (ref 1–4.8)
LYMPHOCYTES NFR BLD: 39.7 % (ref 22–41)
MCH RBC QN AUTO: 31.2 PG (ref 27–33)
MCHC RBC AUTO-ENTMCNC: 32.6 G/DL (ref 33.6–35)
MCV RBC AUTO: 95.6 FL (ref 81.4–97.8)
MONOCYTES # BLD AUTO: 0.63 K/UL (ref 0–0.85)
MONOCYTES NFR BLD AUTO: 9.5 % (ref 0–13.4)
NEUTROPHILS # BLD AUTO: 3.21 K/UL (ref 2–7.15)
NEUTROPHILS NFR BLD: 48.4 % (ref 44–72)
NRBC # BLD AUTO: 0 K/UL
NRBC BLD-RTO: 0 /100 WBC
PLATELET # BLD AUTO: 234 K/UL (ref 164–446)
PMV BLD AUTO: 8.5 FL (ref 9–12.9)
POTASSIUM SERPL-SCNC: 3.7 MMOL/L (ref 3.6–5.5)
PROT SERPL-MCNC: 7.5 G/DL (ref 6–8.2)
RBC # BLD AUTO: 4.52 M/UL (ref 4.2–5.4)
SODIUM SERPL-SCNC: 136 MMOL/L (ref 135–145)
TROPONIN T SERPL-MCNC: <6 NG/L (ref 6–19)
WBC # BLD AUTO: 6.6 K/UL (ref 4.8–10.8)

## 2019-11-29 PROCEDURE — 80053 COMPREHEN METABOLIC PANEL: CPT

## 2019-11-29 PROCEDURE — 85025 COMPLETE CBC W/AUTO DIFF WBC: CPT

## 2019-11-29 PROCEDURE — 93005 ELECTROCARDIOGRAM TRACING: CPT | Performed by: EMERGENCY MEDICINE

## 2019-11-29 PROCEDURE — 71045 X-RAY EXAM CHEST 1 VIEW: CPT

## 2019-11-29 PROCEDURE — 93005 ELECTROCARDIOGRAM TRACING: CPT

## 2019-11-29 PROCEDURE — 99285 EMERGENCY DEPT VISIT HI MDM: CPT

## 2019-11-29 PROCEDURE — 84484 ASSAY OF TROPONIN QUANT: CPT

## 2019-11-29 RX ORDER — CALCIUM CARBONATE 500 MG/1
500 TABLET, CHEWABLE ORAL DAILY
COMMUNITY
End: 2021-04-06

## 2019-11-29 ASSESSMENT — ENCOUNTER SYMPTOMS
SORE THROAT: 0
VOMITING: 0
COUGH: 0
CHILLS: 0
NECK PAIN: 0
ABDOMINAL PAIN: 0
BACK PAIN: 0
FEVER: 0
NAUSEA: 0
DIZZINESS: 1
HEADACHES: 0
DIARRHEA: 0

## 2019-11-29 ASSESSMENT — LIFESTYLE VARIABLES
DO YOU DRINK ALCOHOL: NO
CONSUMPTION TOTAL: INCOMPLETE
HAVE PEOPLE ANNOYED YOU BY CRITICIZING YOUR DRINKING: NO
EVER HAD A DRINK FIRST THING IN THE MORNING TO STEADY YOUR NERVES TO GET RID OF A HANGOVER: NO
EVER FELT BAD OR GUILTY ABOUT YOUR DRINKING: NO
TOTAL SCORE: 0
DOES PATIENT WANT TO STOP DRINKING: NO
TOTAL SCORE: 0
TOTAL SCORE: 0
HAVE YOU EVER FELT YOU SHOULD CUT DOWN ON YOUR DRINKING: NO

## 2019-11-29 NOTE — ED NOTES
Patient awake alert and oriented x 4, Glascow 15, bed in low position, call light within reach, on room air, attached to cardiac monitor, unlabored breathing noted, no cough noted, interacts with staff, interactions noted as appropriate.    Xray completed at this time.

## 2019-11-29 NOTE — ED NOTES
Patient verbalized understanding of discharge instructions, provided with discharge paperwork, gait steady, ambulated independently to ARNAV dixon.

## 2019-11-29 NOTE — ED TRIAGE NOTES
Aleena Diana  44 y.o.  Chief Complaint   Patient presents with   • Chest Pain     L-sided sharp squeezing pressure starting at 0030, concurrent dizziness and SOB, wakes patient from sleep, rates pain 6/10     EKG completed in triage per protocol.    Ambulatory to triage with steady gait for above. A & O x 4, GCS 15.    States that she originally thought pain was caused by heartburn, unrelieved with Tums taken at home. Reports headache earlier in the day relieved with OTC Ibuprofen.    Was seen at her PCP 2 weeks ago for heat palpitations - EKG completed at that time and patient referred to outpatient cardiology.    Triage process explained to patient, apologized for wait time, and returned to [facility].

## 2019-11-29 NOTE — ED PROVIDER NOTES
"ED Provider Note    ED Provider Note    Primary care provider: OSKAR Alonso  Means of arrival: POV  History obtained from: patient  History limited by: None    CHIEF COMPLAINT  Chief Complaint   Patient presents with   • Chest Pain     L-sided sharp squeezing pressure starting at 0030, concurrent dizziness and SOB, wakes patient from sleep, rates pain 6/10       HPI  Aleena Diana is a 44 y.o. female who presents to the Emergency Department chief complaint of left sided chest pain.  Patient states it started about 12:30.  She was weak.  She thought it was heartburn which she has had in the past and she tried to take Tums without relief.  She states that each time she tried to fall asleep the pain would awaken her.  Usually, it was associated with some dizziness and shortness of breath that has since resolved.  Pain comes and goes.  She states about every 30 minutes and can be as severe as 6 out of 10 but sometimes just 2 out of 10.  She had episodes of anxiety before with tightness in her chest as well as GERD symptoms but this felt different, prompting her ED visit.  She denies any diaphoresis.  No neck or shoulder pain.  No nausea or vomiting.  No cough or URI symptoms.  No recent fever.  She reports that recently she was seen by her PCP for palpitations and referred to cardiology.  She did have an EKG done in the office at that time and she was told it was \"normal\".  About 9 or 10 years ago, she underwent echocardiogram and Holter monitoring because she was having elevated heart rates.  It was determined, that this was due to overuse of Afrin.  Her work-up at the time was negative and she stopped using Afrin and her symptoms resolved.  She does not smoke.  Does not use drugs or alcohol.  She has a family history of hypertension but no other significant family history.  She herself does not have hypertension.  She does have diabetes and Hashimoto's thyroiditis.  Patient also voiced concern, " that the symptoms may be related to using ibuprofen which she has increased recently.  She states that she takes a dose a couple of times a week and has done that for about 4 weeks.  Patient is asymptomatic at this time    REVIEW OF SYSTEMS  Review of Systems   Constitutional: Negative for chills and fever.   HENT: Negative for congestion and sore throat.    Respiratory: Negative for cough.    Cardiovascular: Positive for chest pain.   Gastrointestinal: Negative for abdominal pain, diarrhea, nausea and vomiting.   Genitourinary: Negative for dysuria.   Musculoskeletal: Negative for back pain and neck pain.   Neurological: Positive for dizziness. Negative for headaches.   All other systems reviewed and are negative.      PAST MEDICAL HISTORY   has a past medical history of Diabetes (HCC), Palpitations, and Thyroid disease.    SURGICAL HISTORY   has a past surgical history that includes cholecystectomy; appendectomy; and primary c section.    SOCIAL HISTORY  Social History     Tobacco Use   • Smoking status: Former Smoker     Packs/day: 0.00     Last attempt to quit: 2013     Years since quittin.5   • Smokeless tobacco: Never Used   Substance Use Topics   • Alcohol use: Yes     Comment: occasional   • Drug use: No      Social History     Substance and Sexual Activity   Drug Use No       FAMILY HISTORY  Family History   Problem Relation Age of Onset   • Hypertension Mother    • Hypertension Father        CURRENT MEDICATIONS  Home Medications     Reviewed by Fifi Ferraro R.N. (Registered Nurse) on 19 at 0421  Med List Status: Complete   Medication Last Dose Status   calcium carbonate (TUMS) 500 MG Chew Tab  Active   glucose blood (FREESTYLE LITE) strip  Active   Insulin Degludec (TRESIBA FLEXTOUCH) 200 UNIT/ML Solution Pen-injector  Active   NOVOLOG, insulin aspart, (NOVOLOG FLEXPEN) 100 UNIT/ML Solution Pen-injector injection  Active   SYNTHROID 200 MCG Tab  Active                ALLERGIES  Allergies  "  Allergen Reactions   • Bee    • Codeine    • Influenza Vaccines Swelling     Arm became swollen and red   • Ivp [Iodine]    • Other Food      Nuts   • Penicillins    • Tape        PHYSICAL EXAM  VITAL SIGNS: /62   Pulse 76   Temp 36.4 °C (97.5 °F) (Temporal)   Resp 16   Ht 1.676 m (5' 6\")   Wt 86.2 kg (190 lb)   SpO2 94%   BMI 30.67 kg/m²   Vitals reviewed.  Constitutional: Patient is oriented to person, place, and time. Appears well-developed and well-nourished.  No distress.    Head: Normocephalic and atraumatic.   Ears: Normal external ears bilaterally.   Mouth/Throat: Oropharynx is clear and moist, no exudates.   Eyes: Conjunctivae are normal. Pupils are equal, round, and reactive to light.   Neck: Normal range of motion. Neck supple.  Cardiovascular: Normal rate, regular rhythm and normal heart sounds. Normal peripheral pulses.  Pulmonary/Chest: Effort normal and breath sounds normal. No respiratory distress, no wheezes, rhonchi, or rales.  Left-sided chest wall tenderness.  Abdominal: Soft. Bowel sounds are normal. There is no tenderness. No rebound or guarding, or peritoneal signs.  Musculoskeletal: No edema and no tenderness.   Neurological: No focal deficits.   Skin: Skin is warm and dry. No erythema. No pallor.   Psychiatric: Patient has a normal mood and affect.     LABS  Results for orders placed or performed during the hospital encounter of 11/29/19   CBC w/ Differential   Result Value Ref Range    WBC 6.6 4.8 - 10.8 K/uL    RBC 4.52 4.20 - 5.40 M/uL    Hemoglobin 14.1 12.0 - 16.0 g/dL    Hematocrit 43.2 37.0 - 47.0 %    MCV 95.6 81.4 - 97.8 fL    MCH 31.2 27.0 - 33.0 pg    MCHC 32.6 (L) 33.6 - 35.0 g/dL    RDW 46.4 35.9 - 50.0 fL    Platelet Count 234 164 - 446 K/uL    MPV 8.5 (L) 9.0 - 12.9 fL    Neutrophils-Polys 48.40 44.00 - 72.00 %    Lymphocytes 39.70 22.00 - 41.00 %    Monocytes 9.50 0.00 - 13.40 %    Eosinophils 1.80 0.00 - 6.90 %    Basophils 0.30 0.00 - 1.80 %    Immature " Granulocytes 0.30 0.00 - 0.90 %    Nucleated RBC 0.00 /100 WBC    Neutrophils (Absolute) 3.21 2.00 - 7.15 K/uL    Lymphs (Absolute) 2.63 1.00 - 4.80 K/uL    Monos (Absolute) 0.63 0.00 - 0.85 K/uL    Eos (Absolute) 0.12 0.00 - 0.51 K/uL    Baso (Absolute) 0.02 0.00 - 0.12 K/uL    Immature Granulocytes (abs) 0.02 0.00 - 0.11 K/uL    NRBC (Absolute) 0.00 K/uL   Complete Metabolic Panel (CMP)   Result Value Ref Range    Sodium 136 135 - 145 mmol/L    Potassium 3.7 3.6 - 5.5 mmol/L    Chloride 102 96 - 112 mmol/L    Co2 23 20 - 33 mmol/L    Anion Gap 11.0 0.0 - 11.9    Glucose 206 (H) 65 - 99 mg/dL    Bun 12 8 - 22 mg/dL    Creatinine 0.74 0.50 - 1.40 mg/dL    Calcium 9.7 8.5 - 10.5 mg/dL    AST(SGOT) 30 12 - 45 U/L    ALT(SGPT) 44 2 - 50 U/L    Alkaline Phosphatase 70 30 - 99 U/L    Total Bilirubin 0.4 0.1 - 1.5 mg/dL    Albumin 4.5 3.2 - 4.9 g/dL    Total Protein 7.5 6.0 - 8.2 g/dL    Globulin 3.0 1.9 - 3.5 g/dL    A-G Ratio 1.5 g/dL   Troponin in two (2) hours   Result Value Ref Range    Troponin T <6 6 - 19 ng/L   ESTIMATED GFR   Result Value Ref Range    GFR If African American >60 >60 mL/min/1.73 m 2    GFR If Non African American >60 >60 mL/min/1.73 m 2   EKG   Result Value Ref Range    Report       [facility] Emergency Dept.    Test Date:  2019  Pt Name:    CHARISSE COWART          Department: ER  MRN:        [id number]                      Room:  Gender:     Female                       Technician: [id number]  :        1975                   Requested By:ER TRIAGE PROTOCOL  Order #:    [id number]                    Reading MD:    Measurements  Intervals                                Axis  Rate:       82                           P:          56  CT:         182                          QRS:        30  QRSD:       87                           T:          33  QT:         366  QTc:        428    Interpretive Statements  Sinus rhythm  Low voltage, precordial leads  No previous ECG  available for comparison         All labs reviewed by me.    EKG Interpretation  Interpreted by me    Rhythm: normal sinus   Rate: normal  Axis: normal  Ectopy: none  Conduction: normal  ST Segments: no acute change  T Waves: no acute change  Q Waves: none    Clinical Impression: No old EKG for comparison.  No acute changes and normal EKG    RADIOLOGY  DX-CHEST-PORTABLE (1 VIEW)   Final Result      No acute cardiopulmonary abnormality.        The radiologist's interpretation of all radiological studies have been reviewed by me.    COURSE & MEDICAL DECISION MAKING  Pertinent Labs & Imaging studies reviewed. (See chart for details)    Obtained and reviewed past medical records.  Patient's last encounter was for a nurse, she was seen for this in February of this year.    4:37 AM - Patient seen and examined at bedside.  This is a pleasant, overall healthy 44-year-old female.  She does have a history of diabetes.  She is actually asymptomatic at this time but she presented to the ED for complaint of chest pain.  IV has been established.  EKG performed which is reassuring.  Her heart score is less than 3.  Other screening with chest x-ray and labs have been ordered.  Patient has normal vital signs.  She has no needs at this time.  I doubt this is related to her recent ibuprofen use.     6:37 AM patient's reevaluated the bedside.  She remains pain-free.  Her work-up was unrevealing including a negative troponin and a reassuring EKG.  CBC shows a normal white blood cell count within normal H&H and no neutrophilic shift.  Chemistry shows an elevated glucose of 206 but was otherwise quite normal.  Chest x-ray was normal as well.  At this time, I feel she can safely be discharged home.  She is agreeable.  She is given strict return precautions.      FINAL IMPRESSION  1. Chest pain, unspecified type    2. Hyperglycemia    3. History of diabetes mellitus

## 2020-08-25 ENCOUNTER — NON-PROVIDER VISIT (OUTPATIENT)
Dept: OCCUPATIONAL MEDICINE | Facility: CLINIC | Age: 45
End: 2020-08-25

## 2020-08-25 ENCOUNTER — HOSPITAL ENCOUNTER (OUTPATIENT)
Facility: MEDICAL CENTER | Age: 45
End: 2020-08-25
Attending: PREVENTIVE MEDICINE
Payer: COMMERCIAL

## 2020-08-25 DIAGNOSIS — Z02.89 ENCOUNTER FOR OCCUPATIONAL HEALTH ASSESSMENT: ICD-10-CM

## 2020-08-25 DIAGNOSIS — Z02.1 PRE-EMPLOYMENT DRUG SCREENING: ICD-10-CM

## 2020-08-25 PROCEDURE — 80305 DRUG TEST PRSMV DIR OPT OBS: CPT | Performed by: PREVENTIVE MEDICINE

## 2020-08-25 PROCEDURE — 86480 TB TEST CELL IMMUN MEASURE: CPT | Performed by: PREVENTIVE MEDICINE

## 2020-08-28 LAB
GAMMA INTERFERON BACKGROUND BLD IA-ACNC: 0.02 IU/ML
M TB IFN-G BLD-IMP: NEGATIVE
M TB IFN-G CD4+ BCKGRND COR BLD-ACNC: 0 IU/ML
MITOGEN IGNF BCKGRD COR BLD-ACNC: >10 IU/ML
QFT TB2 - NIL TBQ2: 0 IU/ML

## 2021-04-06 ENCOUNTER — OFFICE VISIT (OUTPATIENT)
Dept: MEDICAL GROUP | Facility: PHYSICIAN GROUP | Age: 46
End: 2021-04-06
Payer: COMMERCIAL

## 2021-04-06 VITALS
SYSTOLIC BLOOD PRESSURE: 112 MMHG | TEMPERATURE: 98.8 F | HEIGHT: 66 IN | OXYGEN SATURATION: 97 % | DIASTOLIC BLOOD PRESSURE: 70 MMHG | BODY MASS INDEX: 36.32 KG/M2 | WEIGHT: 226 LBS | HEART RATE: 84 BPM

## 2021-04-06 DIAGNOSIS — Z11.3 SCREEN FOR SEXUALLY TRANSMITTED DISEASES: ICD-10-CM

## 2021-04-06 DIAGNOSIS — E03.8 HYPOTHYROIDISM DUE TO HASHIMOTO'S THYROIDITIS: ICD-10-CM

## 2021-04-06 DIAGNOSIS — F52.8 EXCESSIVE SEXUAL DRIVE: ICD-10-CM

## 2021-04-06 DIAGNOSIS — E06.3 HYPOTHYROIDISM DUE TO HASHIMOTO'S THYROIDITIS: ICD-10-CM

## 2021-04-06 DIAGNOSIS — Z12.31 ENCOUNTER FOR SCREENING MAMMOGRAM FOR BREAST CANCER: ICD-10-CM

## 2021-04-06 DIAGNOSIS — L98.9 SKIN LESION: ICD-10-CM

## 2021-04-06 DIAGNOSIS — Z30.41 ENCOUNTER FOR SURVEILLANCE OF CONTRACEPTIVE PILLS: ICD-10-CM

## 2021-04-06 DIAGNOSIS — E55.9 VITAMIN D DEFICIENCY: ICD-10-CM

## 2021-04-06 DIAGNOSIS — B37.31 VAGINAL YEAST INFECTION: ICD-10-CM

## 2021-04-06 DIAGNOSIS — Z13.220 LIPID SCREENING: ICD-10-CM

## 2021-04-06 PROBLEM — Z79.4 ENCOUNTER FOR LONG-TERM (CURRENT) USE OF INSULIN (HCC): Status: ACTIVE | Noted: 2020-01-17

## 2021-04-06 PROBLEM — R73.9 HYPERGLYCEMIA: Status: ACTIVE | Noted: 2020-01-17

## 2021-04-06 PROBLEM — E03.9 HYPOTHYROIDISM: Status: ACTIVE | Noted: 2020-01-17

## 2021-04-06 PROCEDURE — 99214 OFFICE O/P EST MOD 30 MIN: CPT | Performed by: NURSE PRACTITIONER

## 2021-04-06 RX ORDER — DROSPIRENONE AND ETHINYL ESTRADIOL 0.02-3(28)
1 KIT ORAL DAILY
Qty: 84 TABLET | Refills: 3 | Status: SHIPPED | OUTPATIENT
Start: 2021-04-06 | End: 2021-05-27

## 2021-04-06 RX ORDER — INSULIN GLARGINE 100 [IU]/ML
INJECTION, SOLUTION SUBCUTANEOUS
COMMUNITY
Start: 2021-03-11 | End: 2021-05-27

## 2021-04-06 ASSESSMENT — FIBROSIS 4 INDEX: FIB4 SCORE: 0.85

## 2021-04-06 ASSESSMENT — PATIENT HEALTH QUESTIONNAIRE - PHQ9: CLINICAL INTERPRETATION OF PHQ2 SCORE: 0

## 2021-04-06 NOTE — LETTER
[employer]  OSKAR Kyle  [address]  [place] [address]  Fax: [phone]   Authorization for Release/Disclosure of   Protected Health Information   Name: CHARISSE COWART : 1975 SSN: [id number]   Address: [address] [place] [address] [place] [address] Phone:    [phone] (home)    I authorize the entity listed below to release/disclose the PHI below to:   [employer]/OSKAR Kyle and OSKAR Kyle   Provider or Entity Name:     Address   City, State, Zip   Phone:      Fax:     Reason for request: continuity of care   Information to be released:    [  ] LAST COLONOSCOPY,  including any PATH REPORT and follow-up  [  ] LAST FIT/COLOGUARD RESULT [  ] LAST DEXA  [  ] LAST MAMMOGRAM  [  ] LAST PAP  [  ] LAST LABS [  ] RETINA EXAM REPORT  [  ] IMMUNIZATION RECORDS  [  ] Release all info      [  ] Check here and initial the line next to each item to release ALL health information INCLUDING  _____ Care and treatment for drug and / or alcohol abuse  _____ HIV testing, infection status, or AIDS  _____ Genetic Testing    DATES OF SERVICE OR TIME PERIOD TO BE DISCLOSED: _____________  I understand and acknowledge that:  * This Authorization may be revoked at any time by you in writing, except if your health information has already been used or disclosed.  * Your health information that will be used or disclosed as a result of you signing this authorization could be re-disclosed by the recipient. If this occurs, your re-disclosed health information may no longer be protected by State or Federal laws.  * You may refuse to sign this Authorization. Your refusal will not affect your ability to obtain treatment.  * This Authorization becomes effective upon signing and will  on (date) __________.      If no date is indicated, this Authorization will  one (1) year from the signature date.    Name: Charisse Cowart    Signature:   Date:     2021            PLEASE FAX REQUESTED RECORDS BACK TO: [phone]

## 2021-04-06 NOTE — LETTER
[employer]  OSKAR Kyle  [address]  [place] [address]  Fax: [phone]   Authorization for Release/Disclosure of   Protected Health Information   Name: CHARISSE COWART : 1975 SSN: [id number]   Address: [address] [place] [address] [place] [address] Phone:    [phone] (home)    I authorize the entity listed below to release/disclose the PHI below to:   [employer]/OSKAR Kyle and OSKAR Kyle   Provider or Entity Name:     Address   City, State, Zip   Phone:      Fax:     Reason for request: continuity of care   Information to be released:    [  ] LAST COLONOSCOPY,  including any PATH REPORT and follow-up  [  ] LAST FIT/COLOGUARD RESULT [  ] LAST DEXA  [  ] LAST MAMMOGRAM  [  ] LAST PAP  [  ] LAST LABS [  ] RETINA EXAM REPORT  [  ] IMMUNIZATION RECORDS  [  ] Release all info      [  ] Check here and initial the line next to each item to release ALL health information INCLUDING  _____ Care and treatment for drug and / or alcohol abuse  _____ HIV testing, infection status, or AIDS  _____ Genetic Testing    DATES OF SERVICE OR TIME PERIOD TO BE DISCLOSED: _____________  I understand and acknowledge that:  * This Authorization may be revoked at any time by you in writing, except if your health information has already been used or disclosed.  * Your health information that will be used or disclosed as a result of you signing this authorization could be re-disclosed by the recipient. If this occurs, your re-disclosed health information may no longer be protected by State or Federal laws.  * You may refuse to sign this Authorization. Your refusal will not affect your ability to obtain treatment.  * This Authorization becomes effective upon signing and will  on (date) __________.      If no date is indicated, this Authorization will  one (1) year from the signature date.    Name: Charisse Cowart    Signature:   Date:     2021            PLEASE FAX REQUESTED RECORDS BACK TO: [phone]

## 2021-04-06 NOTE — ASSESSMENT & PLAN NOTE
This is a chronic condition.   Last TSH:  Due  Current medication:  Synthroid 200 mcg daily  She is taking medication as directed, and denies any side effects.  Patient is taking the medication on an empty stomach in the morning and waiting at least 30 minutes before eating.  She reports good energy level on the medication.  She denies fatigue, cold intolerance, weight gain, constipation, dry skin, insomnia, tremor, or change of appetite.    - She does follow closely with endocrinology at [facility] who medically manages this.

## 2021-04-06 NOTE — ASSESSMENT & PLAN NOTE
This is a recurrent condition.  She does use monistat OTC and has fluconazole at home for the concern.  She does endorse that she noticed an increase in the yeast infections when she started to exercise more.  She does wash after her workouts.  She does ensure that she keeps her vaginal/perineal area clean and dry.

## 2021-04-06 NOTE — ASSESSMENT & PLAN NOTE
This is a new condition.  She noticed it about one week ago.  She denies any redness, tenderness, or pain to the area.  It is not bothersome.

## 2021-04-06 NOTE — ASSESSMENT & PLAN NOTE
Patient is sexually active and interested in starting birth control. She does not desire pregnancy.  She is currently using none, and she has used IUD, OCPs in the past.  No family or personal history of clotting disorders or female cancers. No migraines w/aura. Patient does not smoke. She understands risks associated and lack of protection against STI with this method.

## 2021-04-06 NOTE — ASSESSMENT & PLAN NOTE
This is a new condition.  She reports an increase in her sex drive since October 2020.  She recently started working with a , and noticed that the increase in her sex drive correlates with the increase in her physical activity.  She is concern about hormone imbalance, and would like to have labs checked.

## 2021-04-06 NOTE — PROGRESS NOTES
Subjective  Chief Complaint  Establish care to manage her chronic conditions    History of Present Illness  Aleena Diana is a 46 y.o. female. This patient is here today to establish care.  Her prior PCP was Dr. Dereck Montiel ([facility]).    Diabetes type 2, controlled (CMS-[facility])  This is a chronic condition.  Current medications:  lantus 50 units at bedtime; novolog 46 units with every meal  Last A1c:  Due  Last Microalb/Cr ratio:  Due  Fasting sugars:  Due  Last diabetic foot exam:  Last completed 5/2020 - [facility]  Last retinal eye exam:  Last completed 2/2021 - unknown provider, Aleena will provide information  ACEi/ARB?  N/A  Statin?  N/A  Aspirin?  N/A  Concomitant HTN?  N/A  Nightly foot checks?  Yes  She does check sugars at home, average 160 to 220s.  She did try to have a low carbohydrate diet without any changes to her sugar checks.  She denies polyuria, polydipsia, weight loss, numbness or tingling, and blurred vision.    - She does follow closely with endocrinology at [facility] who provides medical management for this.     Hypothyroidism  This is a chronic condition.   Last TSH:  Due  Current medication:  Synthroid 200 mcg daily  She is taking medication as directed, and denies any side effects.  Patient is taking the medication on an empty stomach in the morning and waiting at least 30 minutes before eating.  She reports good energy level on the medication.  She denies fatigue, cold intolerance, weight gain, constipation, dry skin, insomnia, tremor, or change of appetite.    - She does follow closely with endocrinology at [facility] who medically manages this.     Excessive sexual drive  This is a new condition.  She reports an increase in her sex drive since October 2020.  She recently started working with a , and noticed that the increase in her sex drive correlates with the increase in her physical activity.  She is concern about hormone imbalance, and would like to have  labs checked.    Encounter for surveillance of contraceptive pills  Patient is sexually active and interested in starting birth control. She does not desire pregnancy.  She is currently using none, and she has used IUD, OCPs in the past.  No family or personal history of clotting disorders or female cancers. No migraines w/aura. Patient does not smoke. She understands risks associated and lack of protection against STI with this method.    Skin lesion  This is a new condition.  She noticed it about one week ago.  She denies any redness, tenderness, or pain to the area.  It is not bothersome.      Vaginal yeast infection  This is a recurrent condition.  She does use monistat OTC and has fluconazole at home for the concern.  She does endorse that she noticed an increase in the yeast infections when she started to exercise more.  She does wash after her workouts.  She does ensure that she keeps her vaginal/perineal area clean and dry.    Past Medical History    Allergies: Bee venom, Codeine, Penicillins, Tree nuts food allergy, Bee, Influenza vaccines, Ivp [iodine], Other food, and Tape  Past Medical History:   Diagnosis Date   • Diabetes (HCC)    • Palpitations    • Thyroid disease      Past Surgical History:   Procedure Laterality Date   • APPENDECTOMY     • CHOLECYSTECTOMY     • PRIMARY C SECTION       Current Outpatient Medications Ordered in Epic   Medication Sig Dispense Refill   • LANTUS 100 UNIT/ML Solution INJECT UP TO 50 UNITS EVERY DAY AT BEDTIME     • drospirenone-ethinyl estradiol (AUTUMN) 3-0.02 MG per tablet Take 1 tablet by mouth every day. 84 tablet 3   • SYNTHROID 200 MCG Tab TAKE 1 TABLET BY MOUTH ONCE DAILY IN THE MORNING ON AN EMPTY STOMACH 90 Tab 3   • NOVOLOG, insulin aspart, (NOVOLOG FLEXPEN) 100 UNIT/ML Solution Pen-injector injection Inject 5-20 Units as instructed 3 times a day before meals. 5 PEN 6   • glucose blood (FREESTYLE LITE) strip 1 Strip by Other route 3 times a day. 100 Strip 11  "    No current [facility]-ordered facility-administered medications on file.     Family History:    Family History   Problem Relation Age of Onset   • Hypertension Mother    • Hyperlipidemia Mother    • Hypertension Father    • Cancer Maternal Grandfather         lung   • Diabetes Neg Hx    • Heart Disease Neg Hx    • Stroke Neg Hx       Personal/Social History:    Social History     Tobacco Use   • Smoking status: Former Smoker     Packs/day: 0.00     Quit date: 2013     Years since quittin.9   • Smokeless tobacco: Never Used   Substance Use Topics   • Alcohol use: Yes     Comment: occasional   • Drug use: No     Social History     Social History Narrative   • Not on file      Review of Systems:     General: Negative for unexpected weight change.    Eyes:  Negative for vision changes.   Gastrointestinal:  Negative for nausea/vomiting, changes in bowel habits, and abdominal pain.    Genitourinary:  Positive for recurrent yeast infections.  Negative for polyuria.    Musculoskeletal:  Negative for myalgias.    Skin:  Positive for skin lesion.    Neurological:  Negative for headaches.     Objective  Physical Exam:   /70 (BP Location: Right arm, Patient Position: Sitting, BP Cuff Size: Large adult)   Pulse 84   Temp 37.1 °C (98.8 °F) (Temporal)   Ht 1.676 m (5' 6\")   Wt 103 kg (226 lb)   SpO2 97%  Body mass index is 36.48 kg/m².  General:  Alert and oriented.  Well appearing.  NAD.  Head:  Normocephalic.   Eyes:  Eyes conjunctiva clear lids without ptosis, pupils equal and reactive to light accommodation.    ENT: Ears normal shape and contour, canals are clear bilaterally, tympanic membranes are benign.  Oropharynx is without erythema, edema or exudates.   Neck: Supple without JVD. No lymphadenopathy.  Pulmonary:  Normal effort.  Clear to ausculation without rales, ronchi, or wheezing.  Cardiovascular:  Regular rate and rhythm without murmur, rubs or gallop.  Radial pulses are intact and equal " bilaterally.  Gastrointestinal: Abdomen soft, nontender, nondistended. Normal bowel sounds. Liver and spleen are not palpable.  Musculoskeletal:  No extremity cyanosis, clubbing, or edema.  Skin:  Warm and dry.  There is a flat, brown, non-discolored, oval shaped lesion to her left lateral back without any tenderness, erythema, or swelling.   Lymph: No cervical or supraclavicular lymph nodes are palpable.  Neurologic: Grossly intact.  DTRs 2+/3 bilaterally.  Sensation intact.   Psych: Normal mood and affect. Alert and oriented x3. Judgment and insight is normal.    Assessment/Plan   1. Hypothyroidism due to Hashimoto's thyroiditis  This is a chronic condition, stable.   Due for labs.    Continue with current treatment plan.    Continue close follow up with endocrinology.    - CBC WITH DIFFERENTIAL; Future  - Comp Metabolic Panel; Future  - TSH WITH REFLEX TO FT4; Future    2. Unontrolled type 2 diabetes mellitus with insulin therapy (HCC)  This is a chronic condition, not controlled.   Due for labs.   Continue with current treatment plan.   Continue close follow up with endocrinology.   - HEMOGLOBIN A1C; Future  - CBC WITH DIFFERENTIAL; Future  - Comp Metabolic Panel; Future  - Lipid Profile; Future  - MICROALBUMIN, URINE    3. Vitamin D deficiency  This is a chronic condition, unknown status.   Currently does not take supplementation.    Requesting to check vitamin D level.   - VITAMIN D,25 HYDROXY; Future    4. Excessive sexual drive  This is a new condition.  Obtain labs for further evaluation.  Increase exercise may be due to increase in exercise, changes in hormones.  - CBC WITH DIFFERENTIAL; Future  - Comp Metabolic Panel; Future  - ESTROGEN TOTAL; Future  - TESTOSTERONE F&T FEMALES/CHILD; Future    5. Encounter for surveillance of contraceptive pills  This is a new condition.  Has been on OCP/IUDs in the past.  Did not like IUD due to breakthrough bleeding of all discharge when she would like to go back on  OCP.  She has been on Autumn in the past.  Restart Autumn.  This may help with balancing her hormones.  - drospirenone-ethinyl estradiol (AUTUMN) 3-0.02 MG per tablet; Take 1 tablet by mouth every day.  Dispense: 84 tablet; Refill: 3    6. Lipid screening  History of diabetes.  Will completed lipid screening.   - Lipid Profile; Future    7. Vaginal yeast infection  This is a recurrent condition.   Advised patient to continue close monitoring and continue with current treatment for its recurrence.  Discussed weight loss and better control of diabetes to help decrease recurrence.  Should it worse, she has been advised to return back to the office.      8. Skin lesion  This is a new condition.   Advised patient to monitor the area for increase is size, diameter, change in color, or abnormal borders.     9. Encounter for screening mammogram for breast cancer  - MA-SCREENING MAMMO BILAT W/TOMOSYNTHESIS W/CAD; Future    10. Screen for sexually transmitted diseases  Aleena is requesting for STD testing.   - HIV AG/AB COMBO ASSAY SCREENING; Future  - RPR (SYPHILIS); Future  - HCV Scrn ( [id number] [employer]); Future  - CHLAMYDIA/GC PCR URINE OR SWAB; Future    Health Maintenance: Completed    Return in about 4 weeks (around 2021), or if symptoms worsen or fail to improve, for well woman with pap.    Patient verbalized understanding and agreed to plan of care.  We have discussed to contact me with needs via [employer] or by phone if needed.      I have placed the above orders and discussed them with an approved delegating provider.  The MA is performing the below orders under the direction of Dr. Ez DO.     Please note that this dictation was created using voice recognition software. I have made every reasonable attempt to correct obvious errors, but I expect that there are errors of grammar and possibly content that I did not discover before finalizing the note.    EMMANUEL Kyle  [facility]

## 2021-04-06 NOTE — ASSESSMENT & PLAN NOTE
This is a chronic condition.  Current medications:  lantus 50 units at bedtime; novolog 46 units with every meal  Last A1c:  Due  Last Microalb/Cr ratio:  Due  Fasting sugars:  Due  Last diabetic foot exam:  Last completed 5/2020 - [facility] endocrinology  Last retinal eye exam:  Last completed 2/2021 - unknown provider, Aleena will provide information  ACEi/ARB?  N/A  Statin?  N/A  Aspirin?  N/A  Concomitant HTN?  N/A  Nightly foot checks?  Yes  She does check sugars at home, average 160 to 220s.  She did try to have a low carbohydrate diet without any changes to her sugar checks.  She denies polyuria, polydipsia, weight loss, numbness or tingling, and blurred vision.    - She does follow closely with endocrinology at [facility] who provides medical management for this.

## 2021-04-29 ENCOUNTER — PATIENT MESSAGE (OUTPATIENT)
Dept: MEDICAL GROUP | Facility: PHYSICIAN GROUP | Age: 46
End: 2021-04-29

## 2021-04-29 DIAGNOSIS — B37.31 VAGINAL YEAST INFECTION: ICD-10-CM

## 2021-04-29 RX ORDER — FLUCONAZOLE 150 MG/1
150 TABLET ORAL
Qty: 4 TABLET | Refills: 0 | Status: SHIPPED | OUTPATIENT
Start: 2021-04-29 | End: 2021-05-27

## 2021-05-10 ENCOUNTER — HOSPITAL ENCOUNTER (OUTPATIENT)
Dept: RADIOLOGY | Facility: MEDICAL CENTER | Age: 46
End: 2021-05-10
Attending: NURSE PRACTITIONER
Payer: COMMERCIAL

## 2021-05-10 DIAGNOSIS — Z12.31 ENCOUNTER FOR SCREENING MAMMOGRAM FOR BREAST CANCER: ICD-10-CM

## 2021-05-10 PROCEDURE — 77063 BREAST TOMOSYNTHESIS BI: CPT

## 2021-05-12 ENCOUNTER — HOSPITAL ENCOUNTER (OUTPATIENT)
Facility: MEDICAL CENTER | Age: 46
End: 2021-05-12
Attending: NURSE PRACTITIONER
Payer: COMMERCIAL

## 2021-05-12 ENCOUNTER — OFFICE VISIT (OUTPATIENT)
Dept: MEDICAL GROUP | Facility: PHYSICIAN GROUP | Age: 46
End: 2021-05-12
Payer: COMMERCIAL

## 2021-05-12 VITALS
HEART RATE: 80 BPM | TEMPERATURE: 97.9 F | OXYGEN SATURATION: 98 % | BODY MASS INDEX: 36.32 KG/M2 | WEIGHT: 226 LBS | SYSTOLIC BLOOD PRESSURE: 124 MMHG | HEIGHT: 66 IN | DIASTOLIC BLOOD PRESSURE: 78 MMHG

## 2021-05-12 DIAGNOSIS — Z01.419 ENCOUNTER FOR GYNECOLOGICAL EXAMINATION: ICD-10-CM

## 2021-05-12 DIAGNOSIS — Z11.51 SCREENING FOR HUMAN PAPILLOMAVIRUS: ICD-10-CM

## 2021-05-12 DIAGNOSIS — Z11.59 NEED FOR HEPATITIS C SCREENING TEST: ICD-10-CM

## 2021-05-12 DIAGNOSIS — Z11.3 SCREEN FOR SEXUALLY TRANSMITTED DISEASES: ICD-10-CM

## 2021-05-12 DIAGNOSIS — Z12.4 SCREENING FOR CERVICAL CANCER: ICD-10-CM

## 2021-05-12 DIAGNOSIS — M25.541 ARTHRALGIA OF RIGHT HAND: ICD-10-CM

## 2021-05-12 LAB
CANDIDA DNA VAG QL PROBE+SIG AMP: NEGATIVE
G VAGINALIS DNA VAG QL PROBE+SIG AMP: NEGATIVE
T VAGINALIS DNA VAG QL PROBE+SIG AMP: NEGATIVE

## 2021-05-12 PROCEDURE — 87491 CHLMYD TRACH DNA AMP PROBE: CPT

## 2021-05-12 PROCEDURE — 87660 TRICHOMONAS VAGIN DIR PROBE: CPT

## 2021-05-12 PROCEDURE — 87480 CANDIDA DNA DIR PROBE: CPT

## 2021-05-12 PROCEDURE — 87510 GARDNER VAG DNA DIR PROBE: CPT

## 2021-05-12 PROCEDURE — 88175 CYTOPATH C/V AUTO FLUID REDO: CPT

## 2021-05-12 PROCEDURE — 87591 N.GONORRHOEAE DNA AMP PROB: CPT

## 2021-05-12 PROCEDURE — 87624 HPV HI-RISK TYP POOLED RSLT: CPT

## 2021-05-12 PROCEDURE — 99396 PREV VISIT EST AGE 40-64: CPT | Performed by: NURSE PRACTITIONER

## 2021-05-12 ASSESSMENT — FIBROSIS 4 INDEX: FIB4 SCORE: 0.85

## 2021-05-12 NOTE — PROGRESS NOTES
Subjective  Chief Complaint  Chief Complaint   Patient presents with   • Gynecologic Exam     Well Woman Exam      History of Present Illness  Aleena Diana is a 46 y.o. female who presents for annual exam. She is feeling well and denies any complaints.    Ob-Gyn/ History:    Patient has GYN provider: no  /Para:  2/2  Last Pap Smear:  2016. Yes - history of abnormal pap smears ( with colposcopy completed and negative).  Gyn Surgery:  C-sections.  Current Contraceptive Method:  OCPs. Yes currently sexually active.  Last menstrual period:  2021.  Periods regular. Light to heavy bleeding. Cramping is none.   She does not take OTC analgesics for cramps.  No significant bloating/fluid retention, pelvic pain, or dyspareunia. No vaginal discharge.  Post-menopausal bleeding: N/A.  Urinary incontinence:  None.   Folate intake: N/A.     Health Maintenance  Advanced directive: N/A.   Osteoporosis Screen/ DEXA: N/A.     PT/vit D for falls prevention: N/A.  Cholesterol Screening: Ordered.   Diabetes Screening: Diagnosed diabetes type 2.    Aspirin Use: No.  Diet: Healthy eating.    Exercise: Does go to the gym 1x week, yoga 2x week, walk other days of the week, attends personal training 2x week.   Substance Abuse: N/A.   Safe in relationship.  Seat belts, bike helmet, gun safety discussed.  Sun protection used.    Cancer screening  Colorectal Cancer Screening: N/A.     Lung Cancer Screening: N/A.     Cervical Cancer Screening: Complete today.   Breast Cancer Screenin/10/2021.     Infectious disease screening/Immunizations  --STI Screening: Complete today.   --Practices safe sex.  --HIV Screening: Ordered at last visit.     --Hepatitis C Screening: Complete today.     --Immunizations:    Influenza: Declines.    HPV:  Aged out.    Tetanus: Declines.     Shingles: n/a.   Pneumococcal : Declines.      Other immunizations: N/A.      Past Medical History    Allergies: Bee venom, Codeine,  Penicillins, Tree nuts food allergy, Bee, Influenza vaccines, Ivp [iodine], Other food, and Tape  Past Medical History:   Diagnosis Date   • Diabetes (HCC)    • Palpitations    • Thyroid disease      Past Surgical History:   Procedure Laterality Date   • APPENDECTOMY     • CHOLECYSTECTOMY     • PRIMARY C SECTION       Current Outpatient Medications Ordered in Epic   Medication Sig Dispense Refill   • LANTUS 100 UNIT/ML Solution INJECT UP TO 50 UNITS EVERY DAY AT BEDTIME     • drospirenone-ethinyl estradiol (AUTUMN) 3-0.02 MG per tablet Take 1 tablet by mouth every day. 84 tablet 3   • SYNTHROID 200 MCG Tab TAKE 1 TABLET BY MOUTH ONCE DAILY IN THE MORNING ON AN EMPTY STOMACH 90 Tab 3   • NOVOLOG, insulin aspart, (NOVOLOG FLEXPEN) 100 UNIT/ML Solution Pen-injector injection Inject 5-20 Units as instructed 3 times a day before meals. 5 PEN 6   • glucose blood (FREESTYLE LITE) strip 1 Strip by Other route 3 times a day. 100 Strip 11   • fluconazole (DIFLUCAN) 150 MG tablet Take 1 tablet by mouth one time as needed (yeast infection) for up to 1 dose. 4 tablet 0     No current Epic-ordered facility-administered medications on file.     Family History:    Family History   Problem Relation Age of Onset   • Hypertension Mother    • Hyperlipidemia Mother    • Hypertension Father    • Cancer Maternal Grandfather         lung   • Diabetes Neg Hx    • Heart Disease Neg Hx    • Stroke Neg Hx       Personal/Social History:    Social History     Tobacco Use   • Smoking status: Former Smoker     Packs/day: 0.00     Quit date: 2013     Years since quittin.0   • Smokeless tobacco: Never Used   Vaping Use   • Vaping Use: Never used   Substance Use Topics   • Alcohol use: Yes     Comment: occasional   • Drug use: No     Social History     Social History Narrative   • Not on file     Review of Systems  General: Negative for fever/chills.  Eyes:  Negative for vision changes.  ENT:  Negative for hearing changes.   Respiratory:   "Negative for cough and dyspnea.    Cardiovascular:  Negative for chest pain and palpitations.  Gastrointestinal:  Negative for nausea/vomiting.   Genitourinary:  Negative for dysuria.   Musculoskeletal:  Negative for myalgias.   Skin:  Negative for rash.   Heme/Lymph:  Does not bruise/bleed easily.   Neurological:  Negative for numbness/tingling.   Psychiatric/Behavioral:  Negative for depression.  The patient is not nervous/anxious.      Objective  Physical Exam:   /78 (BP Location: Right arm, Patient Position: Sitting, BP Cuff Size: Large adult)   Pulse 80   Temp 36.6 °C (97.9 °F) (Temporal)   Ht 1.676 m (5' 6\")   Wt 103 kg (226 lb)   SpO2 98%  Body mass index is 36.48 kg/m².  Wt Readings from Last 4 Encounters:   05/12/21 103 kg (226 lb)   04/06/21 103 kg (226 lb)   11/29/19 86.2 kg (190 lb)   02/23/19 93.5 kg (206 lb 2.1 oz)     Constitutional: Well-developed and well-nourished. Not diaphoretic. No distress.   Skin: Skin is warm and dry. No rash noted.  Head: Atraumatic without lesions.  Eyes: Conjunctivae and extraocular motions are normal. Pupils are equal, round, and reactive to light. No scleral icterus.   Ears:  External ears unremarkable. Tympanic membranes clear and intact.  Nose: Nares patent. Septum midline. Turbinates without erythema nor edema. No discharge.   Mouth/Throat: Dentition is good. Tongue normal. Oropharynx is clear and moist. Posterior pharynx without erythema or exudates.  Neck: Supple, trachea midline. Normal range of motion. No thyromegaly present. No lymphadenopathy--cervical or supraclavicular.  Cardiovascular: Regular rate and rhythm, S1 and S2 without murmur, rubs, or gallops.  Lungs: Normal inspiratory effort, CTA bilaterally, no wheezes/rhonchi/rales  Abdomen: Soft, non tender, and without distention. Active bowel sounds in all four quadrants. No rebound, guarding, masses or HSM.  :Perineum and external genitalia normal without rash. Vagina with normal and " physiologic, white, odorless discharge. Cervix without visible lesions or discharge. Bimanual exam without adnexal masses or cervical motion tenderness.  Extremities: No cyanosis, clubbing, erythema, nor edema. Distal pulses intact and symmetric.   Musculoskeletal: All major joints AROM full in all directions without pain.  Neurologic: Grossly intact.  DTRs 2+/3 and symmetric.  No cranial nerve deficit.  Sensation intact.   Psychiatric:  Behavior, mood, and affect are appropriate.    A chaperone was offered to the patient during today's exam. Chaperone name: Sera (MA) was present.    Assessment/Plan  1. Encounter for gynecological examination  VAGINAL PATHOGENS DNA PANEL    THINPREP PAP W/HPV AND CTNG   2. Screening for cervical cancer  VAGINAL PATHOGENS DNA PANEL    THINPREP PAP W/HPV AND CTNG   3. Screening for human papillomavirus  VAGINAL PATHOGENS DNA PANEL    THINPREP PAP W/HPV AND CTNG   4. Need for hepatitis C screening test  HEP C VIRUS ANTIBODY   5. Screen for sexually transmitted diseases  HEP C VIRUS ANTIBODY     Health Maintenance: Completed  Labs per orders.  Immunizations per orders.  Patient counseled about skin care, diet, supplements, prenatal vitamins, safe sex and exercise.    Follow-up: Return if symptoms worsen or fail to improve.    I have placed the above orders and discussed them with an approved delegating provider.  The MA is performing the below orders under the direction of Dr. Ez DO.     Please note that this dictation was created using voice recognition software. I have made every reasonable attempt to correct obvious errors, but I expect that there are errors of grammar and possibly content that I did not discover before finalizing the note.    EMMANUEL Kyle  [facility]

## 2021-05-16 LAB
C TRACH DNA GENITAL QL NAA+PROBE: NEGATIVE
CYTOLOGY REG CYTOL: NORMAL
HPV HR 12 DNA CVX QL NAA+PROBE: NEGATIVE
HPV16 DNA SPEC QL NAA+PROBE: NEGATIVE
HPV18 DNA SPEC QL NAA+PROBE: NEGATIVE
N GONORRHOEA DNA GENITAL QL NAA+PROBE: NEGATIVE
SPECIMEN SOURCE: NORMAL
SPECIMEN SOURCE: NORMAL

## 2021-05-19 ENCOUNTER — HOSPITAL ENCOUNTER (OUTPATIENT)
Dept: LAB | Facility: MEDICAL CENTER | Age: 46
End: 2021-05-19
Attending: NURSE PRACTITIONER
Payer: COMMERCIAL

## 2021-05-19 DIAGNOSIS — Z11.59 NEED FOR HEPATITIS C SCREENING TEST: ICD-10-CM

## 2021-05-19 DIAGNOSIS — Z11.3 SCREEN FOR SEXUALLY TRANSMITTED DISEASES: ICD-10-CM

## 2021-05-19 DIAGNOSIS — M25.541 ARTHRALGIA OF RIGHT HAND: ICD-10-CM

## 2021-05-19 DIAGNOSIS — F52.8 EXCESSIVE SEXUAL DRIVE: ICD-10-CM

## 2021-05-19 DIAGNOSIS — E55.9 VITAMIN D DEFICIENCY: ICD-10-CM

## 2021-05-19 DIAGNOSIS — E06.3 HYPOTHYROIDISM DUE TO HASHIMOTO'S THYROIDITIS: ICD-10-CM

## 2021-05-19 DIAGNOSIS — E03.8 HYPOTHYROIDISM DUE TO HASHIMOTO'S THYROIDITIS: ICD-10-CM

## 2021-05-19 DIAGNOSIS — Z13.220 LIPID SCREENING: ICD-10-CM

## 2021-05-19 LAB
BASOPHILS # BLD AUTO: 0.3 % (ref 0–1.8)
BASOPHILS # BLD: 0.02 K/UL (ref 0–0.12)
EOSINOPHIL # BLD AUTO: 0.06 K/UL (ref 0–0.51)
EOSINOPHIL NFR BLD: 0.9 % (ref 0–6.9)
ERYTHROCYTE [DISTWIDTH] IN BLOOD BY AUTOMATED COUNT: 42.5 FL (ref 35.9–50)
HCT VFR BLD AUTO: 43.8 % (ref 37–47)
HGB BLD-MCNC: 14.6 G/DL (ref 12–16)
IMM GRANULOCYTES # BLD AUTO: 0.01 K/UL (ref 0–0.11)
IMM GRANULOCYTES NFR BLD AUTO: 0.2 % (ref 0–0.9)
LYMPHOCYTES # BLD AUTO: 2.28 K/UL (ref 1–4.8)
LYMPHOCYTES NFR BLD: 35.9 % (ref 22–41)
MCH RBC QN AUTO: 31.5 PG (ref 27–33)
MCHC RBC AUTO-ENTMCNC: 33.3 G/DL (ref 33.6–35)
MCV RBC AUTO: 94.6 FL (ref 81.4–97.8)
MONOCYTES # BLD AUTO: 0.51 K/UL (ref 0–0.85)
MONOCYTES NFR BLD AUTO: 8 % (ref 0–13.4)
NEUTROPHILS # BLD AUTO: 3.47 K/UL (ref 2–7.15)
NEUTROPHILS NFR BLD: 54.7 % (ref 44–72)
NRBC # BLD AUTO: 0 K/UL
NRBC BLD-RTO: 0 /100 WBC
PLATELET # BLD AUTO: 254 K/UL (ref 164–446)
PMV BLD AUTO: 9.3 FL (ref 9–12.9)
RBC # BLD AUTO: 4.63 M/UL (ref 4.2–5.4)
WBC # BLD AUTO: 6.4 K/UL (ref 4.8–10.8)

## 2021-05-19 PROCEDURE — 82306 VITAMIN D 25 HYDROXY: CPT

## 2021-05-19 PROCEDURE — 80053 COMPREHEN METABOLIC PANEL: CPT

## 2021-05-19 PROCEDURE — 87491 CHLMYD TRACH DNA AMP PROBE: CPT

## 2021-05-19 PROCEDURE — 36415 COLL VENOUS BLD VENIPUNCTURE: CPT

## 2021-05-19 PROCEDURE — 84402 ASSAY OF FREE TESTOSTERONE: CPT

## 2021-05-19 PROCEDURE — 87591 N.GONORRHOEAE DNA AMP PROB: CPT

## 2021-05-19 PROCEDURE — 84270 ASSAY OF SEX HORMONE GLOBUL: CPT

## 2021-05-19 PROCEDURE — 84439 ASSAY OF FREE THYROXINE: CPT

## 2021-05-19 PROCEDURE — 85025 COMPLETE CBC W/AUTO DIFF WBC: CPT

## 2021-05-19 PROCEDURE — 84550 ASSAY OF BLOOD/URIC ACID: CPT

## 2021-05-19 PROCEDURE — 83036 HEMOGLOBIN GLYCOSYLATED A1C: CPT

## 2021-05-19 PROCEDURE — 84403 ASSAY OF TOTAL TESTOSTERONE: CPT

## 2021-05-19 PROCEDURE — 86780 TREPONEMA PALLIDUM: CPT

## 2021-05-19 PROCEDURE — 84443 ASSAY THYROID STIM HORMONE: CPT

## 2021-05-19 PROCEDURE — G0472 HEP C SCREEN HIGH RISK/OTHER: HCPCS

## 2021-05-19 PROCEDURE — 87389 HIV-1 AG W/HIV-1&-2 AB AG IA: CPT

## 2021-05-19 PROCEDURE — 80061 LIPID PANEL: CPT

## 2021-05-19 PROCEDURE — 82671 ASSAY OF ESTROGENS: CPT

## 2021-05-20 LAB
ALBUMIN SERPL BCP-MCNC: 4 G/DL (ref 3.2–4.9)
ALBUMIN/GLOB SERPL: 1.2 G/DL
ALP SERPL-CCNC: 64 U/L (ref 30–99)
ALT SERPL-CCNC: 22 U/L (ref 2–50)
ANION GAP SERPL CALC-SCNC: 11 MMOL/L (ref 7–16)
AST SERPL-CCNC: 22 U/L (ref 12–45)
BILIRUB SERPL-MCNC: 0.3 MG/DL (ref 0.1–1.5)
BUN SERPL-MCNC: 10 MG/DL (ref 8–22)
CALCIUM SERPL-MCNC: 9.7 MG/DL (ref 8.5–10.5)
CHLORIDE SERPL-SCNC: 105 MMOL/L (ref 96–112)
CHOLEST SERPL-MCNC: 155 MG/DL (ref 100–199)
CO2 SERPL-SCNC: 20 MMOL/L (ref 20–33)
CREAT SERPL-MCNC: 0.67 MG/DL (ref 0.5–1.4)
EST. AVERAGE GLUCOSE BLD GHB EST-MCNC: 154 MG/DL
FASTING STATUS PATIENT QL REPORTED: NORMAL
GLOBULIN SER CALC-MCNC: 3.3 G/DL (ref 1.9–3.5)
GLUCOSE SERPL-MCNC: 139 MG/DL (ref 65–99)
HBA1C MFR BLD: 7 % (ref 4–5.6)
HCV AB SER QL: NORMAL
HDLC SERPL-MCNC: 41 MG/DL
HIV 1+2 AB+HIV1 P24 AG SERPL QL IA: NORMAL
LDLC SERPL CALC-MCNC: 78 MG/DL
POTASSIUM SERPL-SCNC: 4.2 MMOL/L (ref 3.6–5.5)
PROT SERPL-MCNC: 7.3 G/DL (ref 6–8.2)
SODIUM SERPL-SCNC: 136 MMOL/L (ref 135–145)
T4 FREE SERPL-MCNC: 1.71 NG/DL (ref 0.93–1.7)
TREPONEMA PALLIDUM IGG+IGM AB [PRESENCE] IN SERUM OR PLASMA BY IMMUNOASSAY: NORMAL
TRIGL SERPL-MCNC: 182 MG/DL (ref 0–149)
TSH SERPL DL<=0.005 MIU/L-ACNC: 0.17 UIU/ML (ref 0.38–5.33)
URATE SERPL-MCNC: 5.3 MG/DL (ref 1.9–8.2)

## 2021-05-21 LAB
25(OH)D3 SERPL-MCNC: 21 NG/ML (ref 30–80)
C TRACH DNA SPEC QL NAA+PROBE: NEGATIVE
N GONORRHOEA DNA SPEC QL NAA+PROBE: NEGATIVE
SPECIMEN SOURCE: NORMAL

## 2021-05-23 LAB
ESTRADIOL SERPL HS-MCNC: 10.8 PG/ML
ESTROGEN SERPL CALC-MCNC: 35.6 PG/ML
ESTRONE SERPL-MCNC: 24.8 PG/ML

## 2021-05-25 LAB
SHBG SERPL-SCNC: 146 NMOL/L (ref 30–135)
TESTOST FREE SERPL-MCNC: 1.6 PG/ML (ref 1.1–5.8)
TESTOST SERPL-MCNC: 27 NG/DL (ref 9–55)

## 2021-05-27 ENCOUNTER — OFFICE VISIT (OUTPATIENT)
Dept: MEDICAL GROUP | Facility: PHYSICIAN GROUP | Age: 46
End: 2021-05-27
Payer: COMMERCIAL

## 2021-05-27 VITALS
TEMPERATURE: 99.1 F | DIASTOLIC BLOOD PRESSURE: 68 MMHG | SYSTOLIC BLOOD PRESSURE: 116 MMHG | HEART RATE: 77 BPM | WEIGHT: 220 LBS | HEIGHT: 66 IN | BODY MASS INDEX: 35.36 KG/M2 | OXYGEN SATURATION: 96 %

## 2021-05-27 DIAGNOSIS — L91.8 SKIN TAG: ICD-10-CM

## 2021-05-27 PROCEDURE — 11200 RMVL SKIN TAGS UP TO&INC 15: CPT | Performed by: NURSE PRACTITIONER

## 2021-05-27 ASSESSMENT — FIBROSIS 4 INDEX: FIB4 SCORE: 0.81

## 2021-05-27 NOTE — PROGRESS NOTES
"Subjective  Chief Complaint  Chief Complaint   Patient presents with   • Follow-Up     skin tag removal      History of Present Illness  Aleena Diana is a 46 y.o. female. This established patient is here today discuss the following:    Skin tag  This is a chronic condition.  She has one skin tag under her left armpit and another one in a similar location under her right armpit that is bothersome.  She reports that she does cut them when she shaves her hair in those areas.  She is concern about irritation and infection, and would like to have these removed.     Past Medical History    Allergies: Bee venom, Codeine, Penicillins, Tree nuts food allergy, Bee, Influenza vaccines, Ivp [iodine], Other food, and Tape  Past Medical History:   Diagnosis Date   • Diabetes (HCC)    • Palpitations    • Thyroid disease      Current Outpatient Medications Ordered in Epic   Medication Sig Dispense Refill   • SYNTHROID 200 MCG Tab TAKE 1 TABLET BY MOUTH ONCE DAILY IN THE MORNING ON AN EMPTY STOMACH 90 Tab 3   • NOVOLOG, insulin aspart, (NOVOLOG FLEXPEN) 100 UNIT/ML Solution Pen-injector injection Inject 5-20 Units as instructed 3 times a day before meals. 5 PEN 6   • glucose blood (FREESTYLE LITE) strip 1 Strip by Other route 3 times a day. 100 Strip 11     No current [facility]-ordered facility-administered medications on file.     Review of Systems:   See HPI.      Objective  Physical Exam:   /68 (BP Location: Left arm, Patient Position: Sitting, BP Cuff Size: Large adult)   Pulse 77   Temp 37.3 °C (99.1 °F) (Temporal)   Ht 1.676 m (5' 6\")   Wt 99.8 kg (220 lb)   SpO2 96%  Body mass index is 35.51 kg/m².  General:  Alert and oriented.  Well appearing.  NAD  Neck: Supple without JVD. No lymphadenopathy.  Pulmonary:  Normal effort.  Clear to ausculation without rales, ronchi, or wheezing.  Cardiovascular:  Regular rate and rhythm without murmur, rubs or gallop.   Skin:  Warm and dry.  No obvious lesions.  Two skin " tags that are soft, not reddened or tender - one under right armpit and another under left armpit.    Musculoskeletal:  No extremity cyanosis, clubbing, or edema.    Assessment/Plan  1. Skin tag  This is a chronic condition, stable.   She would like to have these removed today.   See procedure note.     Health Maintenance: Completed    Return if symptoms worsen or fail to improve.    Patient verbalized understanding and agreed to plan of care.  We have discussed to contact me with needs via [employer] or by phone if needed.      I have placed the above orders and discussed them with an approved delegating provider.  The MA is performing the above orders under the direction of Dr. Varsha MD.     Please note that this dictation was created using voice recognition software. I have made every reasonable attempt to correct obvious errors, but I expect that there are errors of grammar and possibly content that I did not discover before finalizing the note.    EMMANUEL Kyle  [facility]

## 2021-05-27 NOTE — PROCEDURES
CRYOTHERAPY:  Discussed the benign nature of these lesions.  After discussing risks and benefits, verbal consent was obtained and cryotherapy performed using liquid nitrogen, freeze-thaw-freeze technique x 3.  Patient tolerated the procedure well.  Aftercare as well as potential for blistering was discussed.  I explained that the procedure may need to be repeated if there is not complete resolution.

## 2021-05-27 NOTE — ASSESSMENT & PLAN NOTE
This is a chronic condition.  She has one skin tag under her left armpit and another one in a similar location under her right armpit that is bothersome.  She reports that she does cut them when she shaves her hair in those areas.  She is concern about irritation and infection, and would like to have these removed.

## 2021-06-02 DIAGNOSIS — E11.65 UNCONTROLLED TYPE 2 DIABETES MELLITUS WITH HYPERGLYCEMIA (HCC): ICD-10-CM

## 2021-06-02 RX ORDER — INSULIN ASPART 100 [IU]/ML
30-36 INJECTION, SOLUTION INTRAVENOUS; SUBCUTANEOUS
Qty: 30 ML | Refills: 2 | Status: SHIPPED | OUTPATIENT
Start: 2021-06-02 | End: 2023-09-05 | Stop reason: SDUPTHER

## 2021-07-28 ENCOUNTER — PATIENT MESSAGE (OUTPATIENT)
Dept: MEDICAL GROUP | Facility: PHYSICIAN GROUP | Age: 46
End: 2021-07-28

## 2021-07-28 DIAGNOSIS — B37.31 VAGINAL YEAST INFECTION: ICD-10-CM

## 2021-07-28 RX ORDER — FLUCONAZOLE 150 MG/1
150 TABLET ORAL
Qty: 4 TABLET | Refills: 0 | Status: SHIPPED | OUTPATIENT
Start: 2021-07-28 | End: 2021-08-03 | Stop reason: SDUPTHER

## 2021-07-28 NOTE — PROGRESS NOTES
1. Vaginal yeast infection  Recurrent.  - fluconazole (DIFLUCAN) 150 MG tablet; Take 1 tablet by mouth one time as needed (yeast infection) for up to 1 dose.  Dispense: 4 tablet; Refill: 0

## 2021-08-03 ENCOUNTER — OFFICE VISIT (OUTPATIENT)
Dept: MEDICAL GROUP | Facility: PHYSICIAN GROUP | Age: 46
End: 2021-08-03
Payer: COMMERCIAL

## 2021-08-03 VITALS
SYSTOLIC BLOOD PRESSURE: 112 MMHG | DIASTOLIC BLOOD PRESSURE: 76 MMHG | HEIGHT: 66 IN | RESPIRATION RATE: 12 BRPM | HEART RATE: 68 BPM | TEMPERATURE: 99.3 F | WEIGHT: 224 LBS | BODY MASS INDEX: 36 KG/M2 | OXYGEN SATURATION: 98 %

## 2021-08-03 DIAGNOSIS — B37.31 VAGINAL YEAST INFECTION: ICD-10-CM

## 2021-08-03 DIAGNOSIS — L91.8 SKIN TAG: ICD-10-CM

## 2021-08-03 PROCEDURE — 11200 RMVL SKIN TAGS UP TO&INC 15: CPT | Performed by: NURSE PRACTITIONER

## 2021-08-03 PROCEDURE — 99214 OFFICE O/P EST MOD 30 MIN: CPT | Mod: 25 | Performed by: NURSE PRACTITIONER

## 2021-08-03 RX ORDER — FLUCONAZOLE 150 MG/1
150 TABLET ORAL
Qty: 24 TABLET | Refills: 0 | Status: SHIPPED | OUTPATIENT
Start: 2021-08-03 | End: 2022-05-26

## 2021-08-03 RX ORDER — INSULIN DETEMIR 100 [IU]/ML
INJECTION, SOLUTION SUBCUTANEOUS
COMMUNITY
Start: 2021-06-29 | End: 2021-08-03

## 2021-08-03 ASSESSMENT — FIBROSIS 4 INDEX: FIB4 SCORE: 0.81

## 2021-08-03 NOTE — PROCEDURES
CRYOTHERAPY:  Discussed the benign nature of the lesion.  After discussing risks and benefits, verbal consent was obtained and cryotherapy performed using liquid nitrogen, freeze-thaw-freeze technique x 3.  Patient tolerated the procedure well.  Aftercare as well as potential for blistering was discussed.  I explained that the procedure may need to be repeated if there is not complete resolution.

## 2021-08-03 NOTE — PROGRESS NOTES
Subjective  Chief Complaint  Chief Complaint   Patient presents with   • Follow-Up     skin tag      History of Present Illness  Aleena Diana is a 46 y.o. female. This established patient is here today discuss the following:    Vaginal yeast infections:  She reports that she gets vaginal yeast infections every two weeks; with reports of vaginal itching.  She denies discharge.  She reports that she usually takes the fluconazole and it is effective for a about 5 days, then she will start to have symptoms.     Skin tag:  She is present today for skin tag removal to the right armpit.  She has had cryotherapy done to this area in May; however, the skin tag remains.  She continues to have irritation to the area; and she has valid concerns for infection.  She would like to have cryotherapy for removal.      Past Medical History    Allergies: Bee venom, Codeine, Penicillins, Tree nuts food allergy, Bee, Influenza vaccines, Ivp [iodine], Other food, and Tape  Past Medical History:   Diagnosis Date   • Diabetes (HCC)    • Palpitations    • Thyroid disease      Current Outpatient Medications Ordered in Epic   Medication Sig Dispense Refill   • Continuous Blood Gluc  (FREESTYLE BAYRON 2 READER) Device USE AS DIRECTED     • insulin detemir (LEVEMIR) 100 UNIT/ML Solution LEVEMIR 100 UNIT/ML SOLN     • fluconazole (DIFLUCAN) 150 MG tablet Take 1 tablet by mouth every 7 days. 24 tablet 0   • insulin aspart (NOVOLOG FLEXPEN) 100 UNIT/ML injection PEN Inject 30-36 Units under the skin 3 times a day before meals. 30 mL 2   • SYNTHROID 200 MCG Tab TAKE 1 TABLET BY MOUTH ONCE DAILY IN THE MORNING ON AN EMPTY STOMACH 90 Tab 3   • glucose blood (FREESTYLE LITE) strip 1 Strip by Other route 3 times a day. 100 Strip 11     No current [facility]-ordered facility-administered medications on file.     Review of Systems:   See HPI.      Objective  Physical Exam:   /76 (BP Location: Left arm, Patient Position: Sitting, BP Cuff  "Size: Large adult)   Pulse 68   Temp 37.4 °C (99.3 °F) (Temporal)   Resp 12   Ht 1.676 m (5' 6\")   Wt 102 kg (224 lb)   SpO2 98%  Body mass index is 36.15 kg/m².  General: Alert, no distress, well-groomed.  Skin: Warm, dry, good turgor, no rashes in visible areas.  There is a flesh colored skin tag to the left axilla area that is soft without erythema, tenderness.    Eye: Equal, round and reactive, conjunctiva clear, lids normal.  ENMT: Lips without lesions, good dentition, moist mucous membranes.  Neck: Trachea midline, no masses, no thyromegaly.  Respiratory: Unlabored respiratory effort, no cough.  Musculoskeletal: Normal gait, moves all extremities.  Neuro: Grossly non-focal.   Psych: Alert and oriented x3, normal affect and mood.    Assessment/Plan  1. Vaginal yeast infection  This is a chronic condition, not controlled.   Will provide fluconazole for weekly dose due to the frequency of recurrence.   - fluconazole (DIFLUCAN) 150 MG tablet; Take 1 tablet by mouth every 7 days.  Dispense: 24 tablet; Refill: 0    2. Skin tag  See procedure note.     Health Maintenance: Completed    Return if symptoms worsen or fail to improve.    Patient verbalized understanding and agreed to plan of care.  We have discussed to contact me with needs via [employer] or by phone if needed.      I have placed the above orders and discussed them with an approved delegating provider.  The MA is performing the above orders under the direction of Dr. Hui.    Please note that this dictation was created using voice recognition software. I have made every reasonable attempt to correct obvious errors, but I expect that there are errors of grammar and possibly content that I did not discover before finalizing the note.    EMMANUEL Kyle  [facility]  "

## 2021-08-03 NOTE — PROCEDURES
Skin Tag Removal    Date/Time: 8/3/2021 8:54 AM  Performed by: OSKAR Kyle  Authorized by: OSKAR Kyle     Number of Lesions: 1  Lesion 1:     Body area: upper extremity    Upper extremity location: R upper arm (right axilla)    Malignancy: benign lesion      Destruction method: cryotherapy      Cryo cycles: 3

## 2021-10-21 ENCOUNTER — HOSPITAL ENCOUNTER (OUTPATIENT)
Facility: MEDICAL CENTER | Age: 46
End: 2021-10-21
Attending: STUDENT IN AN ORGANIZED HEALTH CARE EDUCATION/TRAINING PROGRAM
Payer: COMMERCIAL

## 2021-10-21 ENCOUNTER — OFFICE VISIT (OUTPATIENT)
Dept: URGENT CARE | Facility: PHYSICIAN GROUP | Age: 46
End: 2021-10-21
Payer: COMMERCIAL

## 2021-10-21 VITALS
SYSTOLIC BLOOD PRESSURE: 112 MMHG | TEMPERATURE: 97.5 F | WEIGHT: 220 LBS | BODY MASS INDEX: 35.36 KG/M2 | OXYGEN SATURATION: 95 % | DIASTOLIC BLOOD PRESSURE: 68 MMHG | RESPIRATION RATE: 16 BRPM | HEART RATE: 96 BPM | HEIGHT: 66 IN

## 2021-10-21 DIAGNOSIS — Z79.4 CONTROLLED TYPE 2 DIABETES MELLITUS WITH HYPERGLYCEMIA, WITH LONG-TERM CURRENT USE OF INSULIN (HCC): ICD-10-CM

## 2021-10-21 DIAGNOSIS — R11.0 NAUSEA: ICD-10-CM

## 2021-10-21 DIAGNOSIS — R19.7 DIARRHEA, UNSPECIFIED TYPE: ICD-10-CM

## 2021-10-21 DIAGNOSIS — E11.65 CONTROLLED TYPE 2 DIABETES MELLITUS WITH HYPERGLYCEMIA, WITH LONG-TERM CURRENT USE OF INSULIN (HCC): ICD-10-CM

## 2021-10-21 LAB
COVID ORDER STATUS COVID19: NORMAL
EXTERNAL QUALITY CONTROL: NORMAL
INT CON NEG: NORMAL
INT CON POS: NORMAL
POC URINE PREGNANCY TEST: NEGATIVE
SARS-COV+SARS-COV-2 AG RESP QL IA.RAPID: NEGATIVE
SARS-COV-2 RNA RESP QL NAA+PROBE: NOTDETECTED
SPECIMEN SOURCE: NORMAL

## 2021-10-21 PROCEDURE — U0003 INFECTIOUS AGENT DETECTION BY NUCLEIC ACID (DNA OR RNA); SEVERE ACUTE RESPIRATORY SYNDROME CORONAVIRUS 2 (SARS-COV-2) (CORONAVIRUS DISEASE [COVID-19]), AMPLIFIED PROBE TECHNIQUE, MAKING USE OF HIGH THROUGHPUT TECHNOLOGIES AS DESCRIBED BY CMS-2020-01-R: HCPCS

## 2021-10-21 PROCEDURE — U0005 INFEC AGEN DETEC AMPLI PROBE: HCPCS

## 2021-10-21 PROCEDURE — 87426 SARSCOV CORONAVIRUS AG IA: CPT | Performed by: STUDENT IN AN ORGANIZED HEALTH CARE EDUCATION/TRAINING PROGRAM

## 2021-10-21 PROCEDURE — 81025 URINE PREGNANCY TEST: CPT | Performed by: STUDENT IN AN ORGANIZED HEALTH CARE EDUCATION/TRAINING PROGRAM

## 2021-10-21 PROCEDURE — 99214 OFFICE O/P EST MOD 30 MIN: CPT | Performed by: STUDENT IN AN ORGANIZED HEALTH CARE EDUCATION/TRAINING PROGRAM

## 2021-10-21 RX ORDER — ONDANSETRON 4 MG/1
4 TABLET, ORALLY DISINTEGRATING ORAL EVERY 6 HOURS PRN
Qty: 30 TABLET | Refills: 0 | Status: SHIPPED | OUTPATIENT
Start: 2021-10-21 | End: 2022-01-24

## 2021-10-21 ASSESSMENT — FIBROSIS 4 INDEX: FIB4 SCORE: 0.81

## 2021-10-21 NOTE — PROGRESS NOTES
Subjective:   CHIEF COMPLAINT  Chief Complaint   Patient presents with   • Nausea     started monday nausea, diarrhea       HPI  Aleena Diana is a 46 y.o. female who presents c/o nausea and diarrhea x 4 days.  Says she has 1 loose stool today.  No blood in stools. No vomiting or abd pain. She has not tried any medications.  She has been tolerating p.o. intake but has had a diminished appetite.  Admits associated sx of body aches and fatigue. She is vaccinated against covid.    Patient also reports her sugars have been labile.  Says they are running high, in the 190s.  She is on both long and short acting insulin.    REVIEW OF SYSTEMS  General: no fever or chills  GI: no nausea or vomiting  See HPI for further details.    PAST MEDICAL HISTORY  Patient Active Problem List    Diagnosis Date Noted   • Skin tag 05/27/2021   • Excessive sexual drive 04/06/2021   • Encounter for surveillance of contraceptive pills 04/06/2021   • Vaginal yeast infection 04/06/2021   • Encounter for long-term (current) use of insulin ([facility]) 01/17/2020   • Hyperglycemia 01/17/2020   • Hypothyroidism 01/17/2020   • Uncontrolled type 2 diabetes mellitus ([facility]) 01/17/2020   • Skin lesion 11/06/2018   • Right hand pain 04/09/2018   • Plantar fasciitis 04/09/2018   • Fatty liver 04/21/2017   • Elevated LFTs 04/20/2017   • Tonsillith 04/19/2017   • Vitamin D deficiency 01/20/2017   • IUD (intrauterine device) in place 01/17/2017   • Chronic fatigue 01/17/2017   • 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    ALLERGIES  Allergies   Allergen Reactions   • Bee Venom    • Codeine    • Penicillins    • Tree Nuts Food Allergy    • Bee    • Influenza Vaccines Swelling     Arm became swollen and red   • Ivp [Iodine]    • Other Food      Nuts   • Tape        CURRENT MEDICATIONS  Home Medications     Reviewed by Katja Chong on 10/21/21 at 0918  Med List Status: <None>   Medication Last Dose Status   Continuous Blood Gluc  (FREESTYLE BAYRON 2 READER) Device Taking Active   Continuous Blood Gluc Sensor (FREESTYLE BAYRON 2 SENSOR) Misc Taking Active   fluconazole (DIFLUCAN) 150 MG tablet Taking Active   glucose blood (FREESTYLE LITE) strip Taking Active   insulin aspart (NOVOLOG FLEXPEN) 100 UNIT/ML injection PEN Taking Active   insulin detemir (LEVEMIR) 100 UNIT/ML Solution Taking Active   SYNTHROID 200 MCG Tab Taking Active                SOCIAL HISTORY  Social History     Tobacco Use   • Smoking status: Former Smoker     Packs/day: 0.00     Quit date: 2013     Years since quittin.4   • Smokeless tobacco: Never Used   Vaping Use   • Vaping Use: Never used   Substance and Sexual Activity   • Alcohol use: Yes     Comment: occasional   • Drug use: No   • Sexual activity: Yes     Partners: Male       FAMILY HISTORY  Family History   Problem Relation Age of Onset   • Hypertension Mother    • Hyperlipidemia Mother    • Hypertension Father    • Cancer Maternal Grandfather         lung   • Diabetes Neg Hx    • Heart Disease Neg Hx    • Stroke Neg Hx           Objective:   PHYSICAL EXAM  VITAL SIGNS: /68 (BP Location: Left arm, Patient Position: Sitting, BP Cuff Size: Large adult)   Pulse 96   Temp 36.4 °C (97.5 °F) (Temporal)   Resp 16   Ht 1.676 m (5' 6\")   Wt 99.8 kg (220 lb)   SpO2 95%   BMI 35.51 kg/m²     Gen: no acute distress, normal voice  Skin: dry, intact, moist mucosal membranes  Lungs: CTAB w/ symmetric expansion  CV: RRR w/o murmurs or clicks  Abdomen: Bowel sounds " normal, soft, mild right RLQ TTP without rebound or guarding.  No tenderness with straight leg test.  Psych: normal affect, normal judgement, alert, awake      Rapid Covid: Negative  hCG: Negative    Assessment/Plan:     1. Nausea  COVID/SARS CoV-2 PCR    POCT SARS-COV Antigen RAYMUNDO Manual Result    POCT PREGNANCY    ondansetron (ZOFRAN ODT) 4 MG TABLET DISPERSIBLE   2. Diarrhea, unspecified type  COVID/SARS CoV-2 PCR    POCT SARS-COV Antigen RAYMUNDO Manual Result    POCT PREGNANCY    ondansetron (ZOFRAN ODT) 4 MG TABLET DISPERSIBLE   3. uncontrolled type 2 diabetes mellitus with hyperglycemia, with long-term current use of insulin (HCC)     1-2) signs symptoms consistent with acute viral syndrome.  Patient demonstrated mild RLQ TTP without any rebound or guarding.  She does have a history of an appendectomy.  Rapid Covid was negative.  She is immunized against Covid.  -Ordered Covid PCR test   -Rx sent for Zofran to aid and encourage fluid intake  -Encouraged plenty of water  -Encouraged liquids and and then advance diet as tolerated  -Discussed red flags and ED precautions.  Patient verbalized understanding.  All questions were answered.    3) sugars have been running high since onset of symptoms approximately 4 days ago.  We checked her sugars in the clinic with her personal glucometer which demonstrated 191.  Patient says she has not eaten any food today.  I explained to her that this is a stress response from high cortisol levels.  I instructed her to continue using Levemir 20 units nightly and refrain from using sliding scale until her symptoms resolve and she resumes her normal oral intake of food.      Differential diagnosis, natural history, supportive care, and indications for immediate follow-up discussed. All questions answered. Patient agrees with the plan of care.    Follow-up as needed if symptoms worsen or fail to improve to PCP, Urgent care or Emergency Room.    Please note that this dictation was created  using voice recognition software. I have made a reasonable attempt to correct obvious errors, but I expect that there are errors of grammar and possibly content that I did not discover before finalizing the note.

## 2021-10-23 ENCOUNTER — HOSPITAL ENCOUNTER (OUTPATIENT)
Facility: MEDICAL CENTER | Age: 46
End: 2021-10-23
Attending: FAMILY MEDICINE
Payer: COMMERCIAL

## 2021-10-23 ENCOUNTER — OFFICE VISIT (OUTPATIENT)
Dept: URGENT CARE | Facility: PHYSICIAN GROUP | Age: 46
End: 2021-10-23
Payer: COMMERCIAL

## 2021-10-23 ENCOUNTER — HOSPITAL ENCOUNTER (OUTPATIENT)
Dept: RADIOLOGY | Facility: MEDICAL CENTER | Age: 46
End: 2021-10-23
Attending: FAMILY MEDICINE
Payer: COMMERCIAL

## 2021-10-23 VITALS
SYSTOLIC BLOOD PRESSURE: 122 MMHG | OXYGEN SATURATION: 94 % | TEMPERATURE: 98.3 F | RESPIRATION RATE: 16 BRPM | DIASTOLIC BLOOD PRESSURE: 72 MMHG | WEIGHT: 220 LBS | HEART RATE: 103 BPM | BODY MASS INDEX: 35.36 KG/M2 | HEIGHT: 66 IN

## 2021-10-23 DIAGNOSIS — N89.8 VAGINAL DISCHARGE: ICD-10-CM

## 2021-10-23 DIAGNOSIS — R10.30 LOWER ABDOMINAL PAIN: ICD-10-CM

## 2021-10-23 DIAGNOSIS — K52.9 COLITIS: ICD-10-CM

## 2021-10-23 DIAGNOSIS — R19.7 DIARRHEA OF PRESUMED INFECTIOUS ORIGIN: ICD-10-CM

## 2021-10-23 LAB
ALBUMIN SERPL BCP-MCNC: 4.2 G/DL (ref 3.2–4.9)
ALBUMIN/GLOB SERPL: 1.3 G/DL
ALP SERPL-CCNC: 67 U/L (ref 30–99)
ALT SERPL-CCNC: 29 U/L (ref 2–50)
ANION GAP SERPL CALC-SCNC: 13 MMOL/L (ref 7–16)
APPEARANCE UR: NORMAL
AST SERPL-CCNC: 16 U/L (ref 12–45)
BASOPHILS # BLD AUTO: 0.2 % (ref 0–1.8)
BASOPHILS # BLD: 0.03 K/UL (ref 0–0.12)
BILIRUB SERPL-MCNC: 1.1 MG/DL (ref 0.1–1.5)
BILIRUB UR STRIP-MCNC: NORMAL MG/DL
BUN SERPL-MCNC: 7 MG/DL (ref 8–22)
CALCIUM SERPL-MCNC: 8.2 MG/DL (ref 8.5–10.5)
CHLORIDE SERPL-SCNC: 101 MMOL/L (ref 96–112)
CO2 SERPL-SCNC: 23 MMOL/L (ref 20–33)
COLOR UR AUTO: NORMAL
CREAT SERPL-MCNC: 0.88 MG/DL (ref 0.5–1.4)
EOSINOPHIL # BLD AUTO: 0.05 K/UL (ref 0–0.51)
EOSINOPHIL NFR BLD: 0.4 % (ref 0–6.9)
ERYTHROCYTE [DISTWIDTH] IN BLOOD BY AUTOMATED COUNT: 45 FL (ref 35.9–50)
GLOBULIN SER CALC-MCNC: 3.3 G/DL (ref 1.9–3.5)
GLUCOSE SERPL-MCNC: 169 MG/DL (ref 65–99)
GLUCOSE UR STRIP.AUTO-MCNC: NEGATIVE MG/DL
HCT VFR BLD AUTO: 43.3 % (ref 37–47)
HGB BLD-MCNC: 14.6 G/DL (ref 12–16)
IMM GRANULOCYTES # BLD AUTO: 0.04 K/UL (ref 0–0.11)
IMM GRANULOCYTES NFR BLD AUTO: 0.3 % (ref 0–0.9)
KETONES UR STRIP.AUTO-MCNC: 160 MG/DL
LEUKOCYTE ESTERASE UR QL STRIP.AUTO: NORMAL
LYMPHOCYTES # BLD AUTO: 2.38 K/UL (ref 1–4.8)
LYMPHOCYTES NFR BLD: 19 % (ref 22–41)
MCH RBC QN AUTO: 32.3 PG (ref 27–33)
MCHC RBC AUTO-ENTMCNC: 33.7 G/DL (ref 33.6–35)
MCV RBC AUTO: 95.8 FL (ref 81.4–97.8)
MONOCYTES # BLD AUTO: 1.03 K/UL (ref 0–0.85)
MONOCYTES NFR BLD AUTO: 8.2 % (ref 0–13.4)
NEUTROPHILS # BLD AUTO: 8.99 K/UL (ref 2–7.15)
NEUTROPHILS NFR BLD: 71.9 % (ref 44–72)
NITRITE UR QL STRIP.AUTO: NEGATIVE
NRBC # BLD AUTO: 0 K/UL
NRBC BLD-RTO: 0 /100 WBC
PH UR STRIP.AUTO: 5.5 [PH] (ref 5–8)
PLATELET # BLD AUTO: 243 K/UL (ref 164–446)
PMV BLD AUTO: 9.1 FL (ref 9–12.9)
POTASSIUM SERPL-SCNC: 3.8 MMOL/L (ref 3.6–5.5)
PROT SERPL-MCNC: 7.5 G/DL (ref 6–8.2)
PROT UR QL STRIP: 30 MG/DL
RBC # BLD AUTO: 4.52 M/UL (ref 4.2–5.4)
RBC UR QL AUTO: NEGATIVE
SODIUM SERPL-SCNC: 137 MMOL/L (ref 135–145)
SP GR UR STRIP.AUTO: 1.03
UROBILINOGEN UR STRIP-MCNC: 0.2 MG/DL
WBC # BLD AUTO: 12.5 K/UL (ref 4.8–10.8)

## 2021-10-23 PROCEDURE — 87660 TRICHOMONAS VAGIN DIR PROBE: CPT

## 2021-10-23 PROCEDURE — 87899 AGENT NOS ASSAY W/OPTIC: CPT

## 2021-10-23 PROCEDURE — 87491 CHLMYD TRACH DNA AMP PROBE: CPT

## 2021-10-23 PROCEDURE — 87480 CANDIDA DNA DIR PROBE: CPT

## 2021-10-23 PROCEDURE — 87591 N.GONORRHOEAE DNA AMP PROB: CPT

## 2021-10-23 PROCEDURE — 80053 COMPREHEN METABOLIC PANEL: CPT

## 2021-10-23 PROCEDURE — 87510 GARDNER VAG DNA DIR PROBE: CPT

## 2021-10-23 PROCEDURE — 74176 CT ABD & PELVIS W/O CONTRAST: CPT

## 2021-10-23 PROCEDURE — 85025 COMPLETE CBC W/AUTO DIFF WBC: CPT

## 2021-10-23 PROCEDURE — 87045 FECES CULTURE AEROBIC BACT: CPT

## 2021-10-23 PROCEDURE — 81002 URINALYSIS NONAUTO W/O SCOPE: CPT | Performed by: FAMILY MEDICINE

## 2021-10-23 PROCEDURE — 87493 C DIFF AMPLIFIED PROBE: CPT

## 2021-10-23 PROCEDURE — 99214 OFFICE O/P EST MOD 30 MIN: CPT | Performed by: FAMILY MEDICINE

## 2021-10-23 RX ORDER — CIPROFLOXACIN 500 MG/1
500 TABLET, FILM COATED ORAL 2 TIMES DAILY
Qty: 14 TABLET | Refills: 0 | Status: SHIPPED | OUTPATIENT
Start: 2021-10-23 | End: 2021-10-30

## 2021-10-23 RX ORDER — INSULIN DETEMIR 100 [IU]/ML
INJECTION, SOLUTION SUBCUTANEOUS
COMMUNITY
Start: 2021-10-21 | End: 2022-04-01

## 2021-10-23 RX ORDER — METRONIDAZOLE 500 MG/1
500 TABLET ORAL 3 TIMES DAILY
Qty: 21 TABLET | Refills: 0 | Status: SHIPPED | OUTPATIENT
Start: 2021-10-23 | End: 2021-10-30

## 2021-10-23 ASSESSMENT — FIBROSIS 4 INDEX: FIB4 SCORE: 0.81

## 2021-10-23 ASSESSMENT — ENCOUNTER SYMPTOMS
MYALGIAS: 0
EYE DISCHARGE: 0
EYE REDNESS: 0
WEIGHT LOSS: 0

## 2021-10-23 NOTE — PROGRESS NOTES
Discussed case with Dr. Barker.  Subtle findings on CT of possible bowel wall thickening.  White blood cell count is 12.5 neutrophil predominant.  We will try to get stool studies before initiating antibiotics however will send Cipro Flagyl for contingent if symptoms worsen.

## 2021-10-23 NOTE — PROGRESS NOTES
"Subjective     Aleena Diana is a 46 y.o. female who presents with Abdominal Pain (started monday nausea, abdominal pain, diarrhea)            Onset 10/18 diarrhea/multiple episodes without blood. Fever yesterday for a few hours resolved. Nausea. No emesis. Lower abdominal pain is constant. Mild at rest and worse with movement. No PMH IBD. PSxH appendectomy and cholecystectomy. No clear food trigger. No known exposures. No recent travel/camping/abx. No other aggravating or alleviating factors.        Review of Systems   Constitutional: Negative for malaise/fatigue and weight loss.   Eyes: Negative for discharge and redness.   Genitourinary: Negative for dysuria, frequency, hematuria and urgency.   Musculoskeletal: Negative for joint pain and myalgias.   Skin: Negative for itching and rash.              Objective     /72 (BP Location: Right arm, Patient Position: Sitting, BP Cuff Size: Adult)   Pulse (!) 103   Temp 36.8 °C (98.3 °F) (Temporal)   Resp 16   Ht 1.676 m (5' 6\")   Wt 99.8 kg (220 lb)   SpO2 94%   BMI 35.51 kg/m²      Physical Exam  Constitutional:       General: She is not in acute distress.     Appearance: She is well-developed.   HENT:      Head: Normocephalic and atraumatic.   Eyes:      Conjunctiva/sclera: Conjunctivae normal.   Cardiovascular:      Rate and Rhythm: Normal rate and regular rhythm.      Heart sounds: Normal heart sounds. No murmur heard.     Pulmonary:      Effort: Pulmonary effort is normal.      Breath sounds: Normal breath sounds. No wheezing.   Abdominal:      General: Bowel sounds are normal.      Palpations: Abdomen is soft.      Tenderness: There is abdominal tenderness (bilateral lower quadrant, voluntary guarding).   Genitourinary:     Comments: Deferred    Skin:     General: Skin is warm and dry.      Findings: No rash.   Neurological:      Mental Status: She is alert and oriented to person, place, and time.                             Assessment & Plan "      UA reviewed    Pregnancy from 10/21 reviewed    1. Lower abdominal pain  CBC WITH DIFFERENTIAL    Comp Metabolic Panel    CT-ABDOMEN-PELVIS WITH    POCT Urinalysis    CT-ABDOMEN-PELVIS W/O   2. Vaginal discharge  Chlamydia/GC PCR Urine Or Swab    VAGINAL PATHOGENS DNA PANEL     Differential diagnosis, natural history, supportive care, and indications for immediate follow-up discussed at length.     CT without due to iodine allergy    F/u studies.

## 2021-10-24 LAB
C TRACH DNA SPEC QL NAA+PROBE: NEGATIVE
CANDIDA DNA VAG QL PROBE+SIG AMP: NEGATIVE
G VAGINALIS DNA VAG QL PROBE+SIG AMP: NEGATIVE
N GONORRHOEA DNA SPEC QL NAA+PROBE: NEGATIVE
SPECIMEN SOURCE: NORMAL
T VAGINALIS DNA VAG QL PROBE+SIG AMP: NEGATIVE

## 2021-10-25 DIAGNOSIS — R19.7 DIARRHEA OF PRESUMED INFECTIOUS ORIGIN: ICD-10-CM

## 2021-10-26 LAB
C DIFF DNA SPEC QL NAA+PROBE: NEGATIVE
C DIFF TOX GENS STL QL NAA+PROBE: NEGATIVE

## 2021-10-28 LAB
BACTERIA STL CULT: NORMAL
C JEJUNI+C COLI AG STL QL: NORMAL
SIGNIFICANT IND 70042: NORMAL
SITE SITE: NORMAL
SOURCE SOURCE: NORMAL

## 2021-11-15 ENCOUNTER — PATIENT MESSAGE (OUTPATIENT)
Dept: MEDICAL GROUP | Facility: PHYSICIAN GROUP | Age: 46
End: 2021-11-15

## 2021-11-15 DIAGNOSIS — E11.65 UNCONTROLLED TYPE 2 DIABETES MELLITUS WITH HYPERGLYCEMIA (HCC): ICD-10-CM

## 2021-11-15 NOTE — TELEPHONE ENCOUNTER
From: Aleena Diana  To: Nurse Practitioner Emily Hartley  Sent: 11/15/2021 12:47 PM PST  Subject: refill    Good afternoon,    I have been informed that my endocrinologist is no longer seeing patients, so I will need to fine a new one. I have a good supply of insulin and synthroid, but I need the needles for the insulin pens. I am not sure what the brand is. I am taking insulin 5 times a day. Thank you so much.    Karen

## 2021-11-29 ENCOUNTER — PATIENT MESSAGE (OUTPATIENT)
Dept: MEDICAL GROUP | Facility: PHYSICIAN GROUP | Age: 46
End: 2021-11-29

## 2021-11-29 NOTE — TELEPHONE ENCOUNTER
From: Aleena Diana  To: Nurse Practitioner Emily Hartley  Sent: 11/29/2021 1:27 PM PST  Subject: med refill     Good day. I have change insurance and need a refill, please. My endocrinologist started me using the free style salena 2, and I am in need of the sensors. If I could get a 90 day supply sent to [facility] I would really appreciate that. [facility] on [address] ([phone])    ThanksKaren

## 2022-01-24 ENCOUNTER — OFFICE VISIT (OUTPATIENT)
Dept: URGENT CARE | Facility: CLINIC | Age: 47
End: 2022-01-24
Payer: OTHER GOVERNMENT

## 2022-01-24 VITALS
RESPIRATION RATE: 14 BRPM | HEIGHT: 66 IN | TEMPERATURE: 98 F | WEIGHT: 206.6 LBS | BODY MASS INDEX: 33.2 KG/M2 | DIASTOLIC BLOOD PRESSURE: 76 MMHG | OXYGEN SATURATION: 98 % | HEART RATE: 68 BPM | SYSTOLIC BLOOD PRESSURE: 110 MMHG

## 2022-01-24 DIAGNOSIS — R51.9 RIGHT TEMPORAL HEADACHE: ICD-10-CM

## 2022-01-24 PROCEDURE — 99213 OFFICE O/P EST LOW 20 MIN: CPT | Performed by: FAMILY MEDICINE

## 2022-01-24 ASSESSMENT — ENCOUNTER SYMPTOMS
FEVER: 0
NAUSEA: 0
VOMITING: 0
SORE THROAT: 0
BLURRED VISION: 0
DIZZINESS: 0
MYALGIAS: 0
VISUAL CHANGE: 0
NECK PAIN: 0
SHORTNESS OF BREATH: 0
CHILLS: 0
COUGH: 0
HEADACHES: 1

## 2022-01-24 ASSESSMENT — FIBROSIS 4 INDEX: FIB4 SCORE: 0.54

## 2022-01-24 NOTE — PROGRESS NOTES
Subjective:   Aleena Diana is a 46 y.o. female who presents for Headache (head pain above (R) ear  started 2 days ago)        Headache   This is a new problem. Episode onset: 2 days. The problem occurs intermittently. The pain is located in the right unilateral and temporal region. The quality of the pain is described as aching. The pain is moderate. Pertinent negatives include no blurred vision, coughing, dizziness, fever, nausea, neck pain, sore throat, visual change or vomiting. Nothing aggravates the symptoms. She has tried NSAIDs for the symptoms. The treatment provided mild relief.     PMH:  has a past medical history of Diabetes (HCC), Palpitations, and Thyroid disease.  MEDS:   Current Outpatient Medications:   •  insulin detemir (LEVEMIR) 100 UNIT/ML Solution, LEVEMIR 100 UNIT/ML SOLN, Disp: , Rfl:   •  Continuous Blood Gluc Sensor (FREESTYLE BAYRON 2 SENSOR) Misc, Inject 1 Each under the skin every 14 days., Disp: 6 Each, Rfl: 3  •  Insulin Pen Needle 33G X 6 MM Misc, 1 Each 5 Times a Day., Disp: 200 Each, Rfl: 12  •  LEVEMIR FLEXTOUCH 100 UNIT/ML injection PEN, , Disp: , Rfl:   •  Continuous Blood Gluc  (FREESTYLE BAYRON 2 READER) Device, USE AS DIRECTED, Disp: , Rfl:   •  insulin detemir (LEVEMIR) 100 UNIT/ML Solution, LEVEMIR 100 UNIT/ML SOLN, Disp: , Rfl:   •  fluconazole (DIFLUCAN) 150 MG tablet, Take 1 tablet by mouth every 7 days., Disp: 24 tablet, Rfl: 0  •  insulin aspart (NOVOLOG FLEXPEN) 100 UNIT/ML injection PEN, Inject 30-36 Units under the skin 3 times a day before meals., Disp: 30 mL, Rfl: 2  •  SYNTHROID 200 MCG Tab, TAKE 1 TABLET BY MOUTH ONCE DAILY IN THE MORNING ON AN EMPTY STOMACH, Disp: 90 Tab, Rfl: 3  •  glucose blood (FREESTYLE LITE) strip, 1 Strip by Other route 3 times a day., Disp: 100 Strip, Rfl: 11  ALLERGIES:   Allergies   Allergen Reactions   • Bee Venom    • Codeine    • Other Drug    • Penicillins    • Tree Nuts Food Allergy    • Bee    • Influenza Vaccines  "Swelling     Arm became swollen and red   • Ivp [Iodine]    • Other Food      Nuts   • Tape      SURGHX:   Past Surgical History:   Procedure Laterality Date   • APPENDECTOMY     • CHOLECYSTECTOMY     • PRIMARY C SECTION       SOCHX:  reports that she quit smoking about 8 years ago. She smoked 0.00 packs per day. She has never used smokeless tobacco. She reports current alcohol use. She reports that she does not use drugs.  FH:   Family History   Problem Relation Age of Onset   • Hypertension Mother    • Hyperlipidemia Mother    • Hypertension Father    • Cancer Maternal Grandfather         lung   • Diabetes Neg Hx    • Heart Disease Neg Hx    • Stroke Neg Hx      Review of Systems   Constitutional: Negative for chills and fever.   HENT: Negative for sore throat.    Eyes: Negative for blurred vision.   Respiratory: Negative for cough and shortness of breath.    Gastrointestinal: Negative for nausea and vomiting.   Musculoskeletal: Negative for myalgias and neck pain.   Skin: Negative for rash.   Neurological: Positive for headaches. Negative for dizziness.        Objective:   /76 (BP Location: Left arm, Patient Position: Sitting)   Pulse 68   Temp 36.7 °C (98 °F) (Temporal)   Resp 14   Ht 1.676 m (5' 6\")   Wt 93.7 kg (206 lb 9.6 oz)   SpO2 98%   BMI 33.35 kg/m²   Physical Exam  Vitals and nursing note reviewed.   Constitutional:       General: She is not in acute distress.     Appearance: She is well-developed.   HENT:      Head: Normocephalic and atraumatic.      Right Ear: External ear normal.      Left Ear: External ear normal.      Nose: Nose normal.      Mouth/Throat:      Mouth: Mucous membranes are moist.   Eyes:      Extraocular Movements: Extraocular movements intact.      Conjunctiva/sclera: Conjunctivae normal.      Pupils: Pupils are equal, round, and reactive to light.      Funduscopic exam:     Right eye: No hemorrhage or papilledema.         Left eye: No hemorrhage or papilledema. "   Cardiovascular:      Rate and Rhythm: Normal rate.   Pulmonary:      Effort: Pulmonary effort is normal. No respiratory distress.      Breath sounds: Normal breath sounds.   Abdominal:      General: There is no distension.   Musculoskeletal:         General: Normal range of motion.   Skin:     General: Skin is warm and dry.   Neurological:      General: No focal deficit present.      Mental Status: She is alert and oriented to person, place, and time. Mental status is at baseline.      Coordination: Coordination is intact.      Gait: Gait is intact. Gait (gait at baseline) normal.   Psychiatric:         Judgment: Judgment normal.           Assessment/Plan:   1. Right temporal headache    Other orders  - insulin detemir (LEVEMIR) 100 UNIT/ML Solution; LEVEMIR 100 UNIT/ML SOLN    Medical decision-making/course: The patient remained afebrile, hemodynamically and neurologically stable with no evidence of respiratory compromise throughout the urgent care course.  There was no immediate clinical indication for the necessity of emergency department evaluation or inpatient admission and the patient was amendable to a trial of outpatient management.            In the course of preparing for this visit with review of the pertinent past medical history, recent and past clinic visits, current medications, and performing chart, immunization, medical history and medication reconciliation, and in the further course of obtaining the current history pertinent to the clinic visit today, performing an exam and evaluation, ordering and independently evaluating labs, tests  , and/or procedures, prescribing any recommended new medications as noted above, providing any pertinent counseling and education and recommending further coordination of care including recommendations for symptomatic and supportive measures, at least  15 minutes of total time were spent during this encounter.      Discussed close monitoring, return precautions,  and supportive measures of maintaining adequate fluid hydration and caloric intake, relative rest and symptom management as needed for pain and/or fever.    Differential diagnosis, natural history, supportive care, and indications for immediate follow-up discussed.     Advised the patient to follow-up with the primary care physician for recheck, reevaluation, and consideration of further management.    Please note that this dictation was created using voice recognition software. I have worked with consultants from the vendor as well as technical experts from [employer] to optimize the interface. I have made every reasonable attempt to correct obvious errors, but I expect that there are errors of grammar and possibly content that I did not discover before finalizing the note.

## 2022-01-24 NOTE — PATIENT INSTRUCTIONS
"Headaches, Frequently Asked Questions  MIGRAINE HEADACHES  Q: What is migraine? What causes it? How can I treat it?  A: Generally, migraine headaches begin as a dull ache. Then they develop into a constant, throbbing, and pulsating pain. You may experience pain at the temples. You may experience pain at the front or back of one or both sides of the head. The pain is usually accompanied by a combination of:  · Nausea.   · Vomiting.   · Sensitivity to light and noise.   Some people (about 15%) experience an aura (see below) before an attack. The cause of migraine is believed to be chemical reactions in the brain. Treatment for migraine may include over-the-counter or prescription medications. It may also include self-help techniques. These include relaxation training and biofeedback.   Q: What is an aura?  A: About 15% of people with migraine get an \"aura\". This is a sign of neurological symptoms that occur before a migraine headache. You may see wavy or jagged lines, dots, or flashing lights. You might experience tunnel vision or blind spots in one or both eyes. The aura can include visual or auditory hallucinations (something imagined). It may include disruptions in smell (such as strange odors), taste or touch. Other symptoms include:  · Numbness.   · A \"pins and needles\" sensation.   · Difficulty in recalling or speaking the correct word.   These neurological events may last as long as 60 minutes. These symptoms will fade as the headache begins.  Q: What is a trigger?  A: Certain physical or environmental factors can lead to or \"trigger\" a migraine. These include:  · Foods.   · Hormonal changes.   · Weather.   · Stress.   It is important to remember that triggers are different for everyone. To help prevent migraine attacks, you need to figure out which triggers affect you. Keep a headache diary. This is a good way to track triggers. The diary will help you talk to your healthcare professional about your " "condition.  Q: Does weather affect migraines?  A: Bright sunshine, hot, humid conditions, and drastic changes in barometric pressure may lead to, or \"trigger,\" a migraine attack in some people. But studies have shown that weather does not act as a trigger for everyone with migraines.  Q: What is the link between migraine and hormones?  A: Hormones start and regulate many of your body's functions. Hormones keep your body in balance within a constantly changing environment. The levels of hormones in your body are unbalanced at times. Examples are during menstruation, pregnancy, or menopause. That can lead to a migraine attack. In fact, about three quarters of all women with migraine report that their attacks are related to the menstrual cycle.   Q: Is there an increased risk of stroke for migraine sufferers?  A: The likelihood of a migraine attack causing a stroke is very remote. That is not to say that migraine sufferers cannot have a stroke associated with their migraines. In persons under age 40, the most common associated factor for stroke is migraine headache. But over the course of a person's normal life span, the occurrence of migraine headache may actually be associated with a reduced risk of dying from cerebrovascular disease due to stroke.   Q: What are acute medications for migraine?  A: Acute medications are used to treat the pain of the headache after it has started. Examples over-the-counter medications, NSAIDs, ergots, and triptans.   Q: What are the triptans?  A: Triptans are the newest class of abortive medications. They are specifically targeted to treat migraine. Triptans are vasoconstrictors. They moderate some chemical reactions in the brain. The triptans work on receptors in your brain. Triptans help to restore the balance of a neurotransmitter called serotonin. Fluctuations in levels of serotonin are thought to be a main cause of migraine.   Q: Are over-the-counter medications for migraine " "effective?  A: Over-the-counter, or \"OTC,\" medications may be effective in relieving mild to moderate pain and associated symptoms of migraine. But you should see your caregiver before beginning any treatment regimen for migraine.   Q: What are preventive medications for migraine?  A: Preventive medications for migraine are sometimes referred to as \"prophylactic\" treatments. They are used to reduce the frequency, severity, and length of migraine attacks. Examples of preventive medications include antiepileptic medications, antidepressants, beta-blockers, calcium channel blockers, and NSAIDs (nonsteroidal anti-inflammatory drugs).  Q: Why are anticonvulsants used to treat migraine?  A: During the past few years, there has been an increased interest in antiepileptic drugs for the prevention of migraine. They are sometimes referred to as \"anticonvulsants\". Both epilepsy and migraine may be caused by similar reactions in the brain.   Q: Why are antidepressants used to treat migraine?  A: Antidepressants are typically used to treat people with depression. They may reduce migraine frequency by regulating chemical levels, such as serotonin, in the brain.   Q: What alternative therapies are used to treat migraine?  A: The term \"alternative therapies\" is often used to describe treatments considered outside the scope of conventional Western medicine. Examples of alternative therapy include acupuncture, acupressure, and yoga. Another common alternative treatment is herbal therapy. Some herbs are believed to relieve headache pain. Always discuss alternative therapies with your caregiver before proceeding. Some herbal products contain arsenic and other toxins.  TENSION HEADACHES  Q: What is a tension-type headache? What causes it? How can I treat it?  A: Tension-type headaches occur randomly. They are often the result of temporary stress, anxiety, fatigue, or anger. Symptoms include soreness in your temples, a tightening " "band-like sensation around your head (a \"vice-like\" ache). Symptoms can also include a pulling feeling, pressure sensations, and prashanth head and neck muscles. The headache begins in your forehead, temples, or the back of your head and neck. Treatment for tension-type headache may include over-the-counter or prescription medications. Treatment may also include self-help techniques such as relaxation training and biofeedback.  CLUSTER HEADACHES  Q: What is a cluster headache? What causes it? How can I treat it?  A: Cluster headache gets its name because the attacks come in groups. The pain arrives with little, if any, warning. It is usually on one side of the head. A tearing or bloodshot eye and a runny nose on the same side of the headache may also accompany the pain. Cluster headaches are believed to be caused by chemical reactions in the brain. They have been described as the most severe and intense of any headache type. Treatment for cluster headache includes prescription medication and oxygen.  SINUS HEADACHES  Q: What is a sinus headache? What causes it? How can I treat it?  A: When a cavity in the bones of the face and skull (a sinus) becomes inflamed, the inflammation will cause localized pain. This condition is usually the result of an allergic reaction, a tumor, or an infection. If your headache is caused by a sinus blockage, such as an infection, you will probably have a fever. An x-ray will confirm a sinus blockage. Your caregiver's treatment might include antibiotics for the infection, as well as antihistamines or decongestants.   REBOUND HEADACHES  Q: What is a rebound headache? What causes it? How can I treat it?  A: A pattern of taking acute headache medications too often can lead to a condition known as \"rebound headache.\" A pattern of taking too much headache medication includes taking it more than 2 days per week or in excessive amounts. That means more than the label or a caregiver advises. " With rebound headaches, your medications not only stop relieving pain, they actually begin to cause headaches. Doctors treat rebound headache by tapering the medication that is being overused. Sometimes your caregiver will gradually substitute a different type of treatment or medication. Stopping may be a challenge. Regularly overusing a medication increases the potential for serious side effects. Consult a caregiver if you regularly use headache medications more than 2 days per week or more than the label advises.  ADDITIONAL QUESTIONS AND ANSWERS  Q: What is biofeedback?  A: Biofeedback is a self-help treatment. Biofeedback uses special equipment to monitor your body's involuntary physical responses. Biofeedback monitors:  · Breathing.   · Pulse.   · Heart rate.   · Temperature.   · Muscle tension.   · Brain activity.   Biofeedback helps you refine and perfect your relaxation exercises. You learn to control the physical responses that are related to stress. Once the technique has been mastered, you do not need the equipment any more.  Q: Are headaches hereditary?  A: Four out of five (80%) of people that suffer report a family history of migraine. Scientists are not sure if this is genetic or a family predisposition. Despite the uncertainty, a child has a 50% chance of having migraine if one parent suffers. The child has a 75% chance if both parents suffer.   Q: Can children get headaches?  A: By the time they reach high school, most young people have experienced some type of headache. Many safe and effective approaches or medications can prevent a headache from occurring or stop it after it has begun.   Q: What type of doctor should I see to diagnose and treat my headache?  A: Start with your primary caregiver. Discuss his or her experience and approach to headaches. Discuss methods of classification, diagnosis, and treatment. Your caregiver may decide to recommend you to a headache specialist, depending upon  your symptoms or other physical conditions. Having diabetes, allergies, etc., may require a more comprehensive and inclusive approach to your headache. The [employer] will provide, upon request, a list of NHF physician members in your state.  Document Released: 03/09/2005 Document Revised: 03/11/2013 Document Reviewed: 08/17/2009  [employer]® Patient Information ©2013 [employer], [employer].

## 2022-03-31 SDOH — ECONOMIC STABILITY: INCOME INSECURITY: IN THE LAST 12 MONTHS, WAS THERE A TIME WHEN YOU WERE NOT ABLE TO PAY THE MORTGAGE OR RENT ON TIME?: NO

## 2022-03-31 SDOH — ECONOMIC STABILITY: HOUSING INSECURITY
IN THE LAST 12 MONTHS, WAS THERE A TIME WHEN YOU DID NOT HAVE A STEADY PLACE TO SLEEP OR SLEPT IN A SHELTER (INCLUDING NOW)?: NO

## 2022-03-31 SDOH — ECONOMIC STABILITY: TRANSPORTATION INSECURITY
IN THE PAST 12 MONTHS, HAS THE LACK OF TRANSPORTATION KEPT YOU FROM MEDICAL APPOINTMENTS OR FROM GETTING MEDICATIONS?: NO

## 2022-03-31 SDOH — ECONOMIC STABILITY: TRANSPORTATION INSECURITY
IN THE PAST 12 MONTHS, HAS LACK OF RELIABLE TRANSPORTATION KEPT YOU FROM MEDICAL APPOINTMENTS, MEETINGS, WORK OR FROM GETTING THINGS NEEDED FOR DAILY LIVING?: NO

## 2022-03-31 SDOH — ECONOMIC STABILITY: FOOD INSECURITY: WITHIN THE PAST 12 MONTHS, YOU WORRIED THAT YOUR FOOD WOULD RUN OUT BEFORE YOU GOT MONEY TO BUY MORE.: NEVER TRUE

## 2022-03-31 SDOH — HEALTH STABILITY: PHYSICAL HEALTH: ON AVERAGE, HOW MANY MINUTES DO YOU ENGAGE IN EXERCISE AT THIS LEVEL?: 30 MIN

## 2022-03-31 SDOH — ECONOMIC STABILITY: HOUSING INSECURITY: IN THE LAST 12 MONTHS, HOW MANY PLACES HAVE YOU LIVED?: 1

## 2022-03-31 SDOH — ECONOMIC STABILITY: TRANSPORTATION INSECURITY
IN THE PAST 12 MONTHS, HAS LACK OF TRANSPORTATION KEPT YOU FROM MEETINGS, WORK, OR FROM GETTING THINGS NEEDED FOR DAILY LIVING?: NO

## 2022-03-31 SDOH — HEALTH STABILITY: MENTAL HEALTH
STRESS IS WHEN SOMEONE FEELS TENSE, NERVOUS, ANXIOUS, OR CAN'T SLEEP AT NIGHT BECAUSE THEIR MIND IS TROUBLED. HOW STRESSED ARE YOU?: NOT AT ALL

## 2022-03-31 SDOH — ECONOMIC STABILITY: FOOD INSECURITY: WITHIN THE PAST 12 MONTHS, THE FOOD YOU BOUGHT JUST DIDN'T LAST AND YOU DIDN'T HAVE MONEY TO GET MORE.: NEVER TRUE

## 2022-03-31 SDOH — HEALTH STABILITY: PHYSICAL HEALTH: ON AVERAGE, HOW MANY DAYS PER WEEK DO YOU ENGAGE IN MODERATE TO STRENUOUS EXERCISE (LIKE A BRISK WALK)?: 5 DAYS

## 2022-03-31 SDOH — ECONOMIC STABILITY: INCOME INSECURITY: HOW HARD IS IT FOR YOU TO PAY FOR THE VERY BASICS LIKE FOOD, HOUSING, MEDICAL CARE, AND HEATING?: NOT HARD AT ALL

## 2022-03-31 ASSESSMENT — SOCIAL DETERMINANTS OF HEALTH (SDOH)
HOW OFTEN DO YOU GET TOGETHER WITH FRIENDS OR RELATIVES?: TWICE A WEEK
HOW OFTEN DO YOU HAVE A DRINK CONTAINING ALCOHOL: MONTHLY OR LESS
IN A TYPICAL WEEK, HOW MANY TIMES DO YOU TALK ON THE PHONE WITH FAMILY, FRIENDS, OR NEIGHBORS?: TWICE A WEEK
HOW OFTEN DO YOU GET TOGETHER WITH FRIENDS OR RELATIVES?: TWICE A WEEK
HOW OFTEN DO YOU ATTENT MEETINGS OF THE CLUB OR ORGANIZATION YOU BELONG TO?: 1 TO 4 TIMES PER YEAR
HOW OFTEN DO YOU ATTENT MEETINGS OF THE CLUB OR ORGANIZATION YOU BELONG TO?: 1 TO 4 TIMES PER YEAR
HOW OFTEN DO YOU ATTEND CHURCH OR RELIGIOUS SERVICES?: NEVER
DO YOU BELONG TO ANY CLUBS OR ORGANIZATIONS SUCH AS CHURCH GROUPS UNIONS, FRATERNAL OR ATHLETIC GROUPS, OR SCHOOL GROUPS?: YES
HOW OFTEN DO YOU HAVE SIX OR MORE DRINKS ON ONE OCCASION: NEVER
IN A TYPICAL WEEK, HOW MANY TIMES DO YOU TALK ON THE PHONE WITH FAMILY, FRIENDS, OR NEIGHBORS?: TWICE A WEEK
DO YOU BELONG TO ANY CLUBS OR ORGANIZATIONS SUCH AS CHURCH GROUPS UNIONS, FRATERNAL OR ATHLETIC GROUPS, OR SCHOOL GROUPS?: YES
HOW OFTEN DO YOU ATTEND CHURCH OR RELIGIOUS SERVICES?: NEVER
WITHIN THE PAST 12 MONTHS, YOU WORRIED THAT YOUR FOOD WOULD RUN OUT BEFORE YOU GOT THE MONEY TO BUY MORE: NEVER TRUE
HOW HARD IS IT FOR YOU TO PAY FOR THE VERY BASICS LIKE FOOD, HOUSING, MEDICAL CARE, AND HEATING?: NOT HARD AT ALL
HOW MANY DRINKS CONTAINING ALCOHOL DO YOU HAVE ON A TYPICAL DAY WHEN YOU ARE DRINKING: 1 OR 2

## 2022-03-31 ASSESSMENT — LIFESTYLE VARIABLES
HOW OFTEN DO YOU HAVE SIX OR MORE DRINKS ON ONE OCCASION: NEVER
HOW MANY STANDARD DRINKS CONTAINING ALCOHOL DO YOU HAVE ON A TYPICAL DAY: 1 OR 2
HOW OFTEN DO YOU HAVE A DRINK CONTAINING ALCOHOL: MONTHLY OR LESS

## 2022-04-01 ENCOUNTER — OFFICE VISIT (OUTPATIENT)
Dept: MEDICAL GROUP | Facility: PHYSICIAN GROUP | Age: 47
End: 2022-04-01
Payer: OTHER GOVERNMENT

## 2022-04-01 VITALS
TEMPERATURE: 97.1 F | DIASTOLIC BLOOD PRESSURE: 60 MMHG | BODY MASS INDEX: 32.14 KG/M2 | RESPIRATION RATE: 16 BRPM | HEIGHT: 66 IN | SYSTOLIC BLOOD PRESSURE: 124 MMHG | WEIGHT: 200 LBS | OXYGEN SATURATION: 96 % | HEART RATE: 80 BPM

## 2022-04-01 DIAGNOSIS — E10.9 TYPE 1 DIABETES MELLITUS WITHOUT COMPLICATION (HCC): ICD-10-CM

## 2022-04-01 DIAGNOSIS — Z00.00 ANNUAL PHYSICAL EXAM: ICD-10-CM

## 2022-04-01 DIAGNOSIS — Z12.31 ENCOUNTER FOR SCREENING MAMMOGRAM FOR MALIGNANT NEOPLASM OF BREAST: ICD-10-CM

## 2022-04-01 DIAGNOSIS — E03.8 HYPOTHYROIDISM DUE TO HASHIMOTO'S THYROIDITIS: ICD-10-CM

## 2022-04-01 DIAGNOSIS — E06.3 HYPOTHYROIDISM DUE TO HASHIMOTO'S THYROIDITIS: ICD-10-CM

## 2022-04-01 PROBLEM — Z79.891 ENCOUNTER FOR LONG-TERM METHADONE USE: Status: ACTIVE | Noted: 2020-04-24

## 2022-04-01 PROCEDURE — 99214 OFFICE O/P EST MOD 30 MIN: CPT | Performed by: NURSE PRACTITIONER

## 2022-04-01 RX ORDER — LEVOTHYROXINE SODIUM 200 MCG
200 TABLET ORAL
Qty: 30 TABLET | Refills: 0 | Status: SHIPPED | OUTPATIENT
Start: 2022-04-01 | End: 2022-06-07 | Stop reason: SDUPTHER

## 2022-04-01 ASSESSMENT — PATIENT HEALTH QUESTIONNAIRE - PHQ9: CLINICAL INTERPRETATION OF PHQ2 SCORE: 0

## 2022-04-01 ASSESSMENT — FIBROSIS 4 INDEX: FIB4 SCORE: 0.55

## 2022-04-01 NOTE — PROGRESS NOTES
Chief Complaint   Patient presents with   • Establish Care   • Referral Needed       HISTORY OF PRESENT ILLNESS: Patient is a 47 y.o. female, established patient who presents today to discuss medical problems as listed below:    Health Maintenance:  COMPLETED     Type 1 diabetes mellitus without complication (HCC)  Chronic problem. Initially dx'd with type 1 7 yrs ago. Not sure if type 2, however has autoimmune conditions. PO DM meds did not work. Was followed by endocrinology. Needing a new referral today. BGs at home around 170s, leading a healthy lifestyle healthy nutrition and exercise.   Now on Novolog daily with parameter, -200 take 2units, 200 and above 4 units. Carb counting. No hypoglycemic episode.   Last retina check 8 mos and normal per pt. Denies neuropathy.      Patient Active Problem List    Diagnosis Date Noted   • Skin tag 05/27/2021   • Excessive sexual drive 04/06/2021   • Encounter for surveillance of contraceptive pills 04/06/2021   • Vaginal yeast infection 04/06/2021   • Encounter for long-term methadone use 04/24/2020   • Encounter for long-term (current) use of insulin (HCC) 01/17/2020   • Hyperglycemia 01/17/2020   • Hypothyroidism 01/17/2020   • Type 1 diabetes mellitus without complication (HCC) 01/17/2020   • Skin lesion 11/06/2018   • Right hand pain 04/09/2018   • Plantar fasciitis 04/09/2018   • Fatty liver 04/21/2017   • Elevated LFTs 04/20/2017   • Tonsillith 04/19/2017   • Vitamin D deficiency 01/20/2017   • IUD (intrauterine device) in place 01/17/2017   • Chronic fatigue 01/17/2017   • 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   • Hypothyroidism due to Hashimoto's thyroiditis 10/06/2016   • Obesity with body mass index 30 or greater 10/06/2016   • Seasonal allergic rhinitis 10/06/2016   • Erythema of lower extremity 10/06/2016   • Mild intermittent asthma without complication 10/06/2016   • Diabetes type 2, controlled (HCC)         Allergies: Bee venom, Codeine, Other  drug, Penicillins, Tree nuts food allergy, Bee, Influenza vaccines, Ivp [iodine], Other food, and Tape    Current Outpatient Medications   Medication Sig Dispense Refill   • SYNTHROID 200 MCG Tab Take 1 Tablet by mouth every morning on an empty stomach. 30 Tablet 0   • fluconazole (DIFLUCAN) 150 MG tablet Take 1 tablet by mouth every 7 days. 24 tablet 0   • insulin aspart (NOVOLOG FLEXPEN) 100 UNIT/ML injection PEN Inject 30-36 Units under the skin 3 times a day before meals. 30 mL 2     No current facility-administered medications for this visit.       Social History     Tobacco Use   • Smoking status: Former Smoker     Packs/day: 1.00     Years: 20.00     Pack years: 20.00     Types: Cigarettes     Quit date: 2013     Years since quittin.9   • Smokeless tobacco: Never Used   Vaping Use   • Vaping Use: Never used   Substance Use Topics   • Alcohol use: Yes     Alcohol/week: 0.0 oz     Comment: occasional   • Drug use: Yes     Types: Marijuana, Oral     Social History     Social History Narrative   • Not on file       Family History   Problem Relation Age of Onset   • Hypertension Mother    • Hyperlipidemia Mother    • Hypertension Father    • Cancer Maternal Grandfather         lung   • Diabetes Neg Hx    • Heart Disease Neg Hx    • Stroke Neg Hx        Allergies, past medical history, past surgical history, family history, social history reviewed and updated.    Review of Systems:     - Constitutional: Negative for fever, chills, unexpected weight change, and fatigue/generalized weakness.     - HEENT: Negative for headaches, vision changes, hearing changes, ear pain, ear discharge, rhinorrhea, sinus congestion, sore throat, and neck pain.      - Respiratory: Negative for cough, sputum production, chest congestion, dyspnea, wheezing, and crackles.      - Cardiovascular: Negative for chest pain, palpitations, orthopnea, and bilateral lower extremity edema.     - Gastrointestinal: Negative for heartburn,  "nausea, vomiting, abdominal pain, hematochezia, melena, diarrhea, constipation, and greasy/foul-smelling stools.     - Genitourinary: Negative for dysuria, polyuria, hematuria, pyuria, urinary urgency, and urinary incontinence.    - Musculoskeletal: Negative for myalgias, back pain, and joint pain.     - Skin: Negative for rash, itching, cyanotic skin color change.     - Neurological: Negative for dizziness, tingling, tremors, focal sensory deficit, focal weakness and headaches.     - Endo/Heme/Allergies: Does not bruise/bleed easily.     - Psychiatric/Behavioral: Negative for depression, suicidal/homicidal ideation and memory loss.      All other systems reviewed and are negative    Exam:    /60   Pulse 80   Temp 36.2 °C (97.1 °F) (Temporal)   Resp 16   Ht 1.676 m (5' 6\")   Wt 90.7 kg (200 lb)   SpO2 96%   BMI 32.28 kg/m²  Body mass index is 32.28 kg/m².    Physical Exam:  Constitutional: Well-developed and well-nourished. Not diaphoretic. No distress.   Skin: Skin is warm and dry. No rash noted.  Head: Atraumatic without lesions.  Eyes: Conjunctivae and extraocular motions are normal. Pupils are equal, round, and reactive to light. No scleral icterus.   Ears:  External ears unremarkable. Tympanic membranes clear and intact.  Nose: Nares patent. Septum midline. Turbinates without erythema nor edema. No discharge.   Mouth/Throat: Dentition is normal. Tongue normal. Oropharynx is clear and moist. Posterior pharynx without erythema or exudates.  Neck: Supple, trachea midline. Normal range of motion. No thyromegaly present. No lymphadenopathy--cervical or supraclavicular.  Cardiovascular: Regular rate and rhythm, S1 and S2 without murmur, rubs, or gallops.    Chest: Effort normal. Clear to auscultation throughout. No adventitious sounds. No CVA tenderness.  Abdomen: Soft, non tender, and without distention. Active bowel sounds in all four quadrants. No rebound, guarding, masses or HSM.  : Negative for " dysuria, polyuria, hematuria, pyuria, urinary urgency, and urinary incontinence.  Extremities: No cyanosis, clubbing, erythema, nor edema. Distal pulses intact and symmetric.   Musculoskeletal: All major joints AROM full in all directions without pain.  Neurological: Alert and oriented x 3. DTRs 2+/3 and symmetric. No cranial nerve deficit. 5/5 myotomes. Sensation intact. Negative Rhomberg.  Psychiatric:  Behavior, mood, and affect are appropriate.  MA/nursing note and vitals reviewed.    LABS: 2021  results reviewed and discussed with the patient, questions answered.     Assessment/Plan:  1. Type 1 diabetes mellitus without complication (HCC)  - Diabetic Monofilament LE Exam  - Hemoglobin A1C; Future  - Referral for Retinal Screening Exam; Future  - VITAMIN D,25 HYDROXY; Future  - HEMOGLOBIN A1C; Future  - Referral to Endocrinology    2. Annual physical exam  - CBC WITHOUT DIFFERENTIAL; Future  - Comp Metabolic Panel; Future  - TSH; Future  - FREE THYROXINE; Future  - VITAMIN D,25 HYDROXY; Future  - Lipid Profile; Future  - HEMOGLOBIN A1C; Future    3. Hypothyroidism due to Hashimoto's thyroiditis  - Referral to Endocrinology  - SYNTHROID 200 MCG Tab; Take 1 Tablet by mouth every morning on an empty stomach.  Dispense: 30 Tablet; Refill: 0    4. Encounter for screening mammogram for malignant neoplasm of breast  - MA-SCREENING MAMMO BILAT W/TOMOSYNTHESIS W/CAD; Future       Discussed with patient possible alternative diagnoses, pt is to take all medications as prescribed. If symptoms persist FU w/PCP, if symptoms worsen go to emergency room. If experiencing any side effects from prescribed medications reports to the office immediately or go to emergency room.  Reviewed indication, dosage, usage and potential adverse effects of prescribed medications. Reviewed risks and benefits of treatment plan. Patient verbalizes understanding of all instruction and verbally agrees to plan.    No follow-ups on file. 2-3 wks for  lab review and med refills

## 2022-04-01 NOTE — ASSESSMENT & PLAN NOTE
Chronic problem. Initially dx'd with type 1 7 yrs ago. Not sure if type 2, however has autoimmune conditions. PO DM meds did not work. Was followed by endocrinology. Needing a new referral today. BGs at home around 170s, leading a healthy lifestyle healthy nutrition and exercise.   Now on Novolog daily with parameter, -200 take 2units, 200 and above 4 units. Carb counting. No hypoglycemic episode.   Last retina check 8 mos and normal per pt. Denies neuropathy.

## 2022-05-23 LAB
ALBUMIN SERPL BCP-MCNC: 4.5 G/DL (ref 3.2–4.9)
ALBUMIN/GLOB SERPL: 1.6 G/DL
ALP SERPL-CCNC: 72 U/L (ref 30–99)
ALT SERPL-CCNC: 14 U/L (ref 2–50)
ANION GAP SERPL CALC-SCNC: 13 MMOL/L (ref 7–16)
AST SERPL-CCNC: 17 U/L (ref 12–45)
BASOPHILS # BLD AUTO: 0.3 % (ref 0–1.8)
BASOPHILS # BLD: 0.03 K/UL (ref 0–0.12)
BILIRUB SERPL-MCNC: 0.5 MG/DL (ref 0.1–1.5)
BUN SERPL-MCNC: 11 MG/DL (ref 8–22)
CALCIUM SERPL-MCNC: 9.5 MG/DL (ref 8.5–10.5)
CHLORIDE SERPL-SCNC: 102 MMOL/L (ref 96–112)
CO2 SERPL-SCNC: 20 MMOL/L (ref 20–33)
CREAT SERPL-MCNC: 0.74 MG/DL (ref 0.5–1.4)
EKG IMPRESSION: NORMAL
EOSINOPHIL # BLD AUTO: 0.16 K/UL (ref 0–0.51)
EOSINOPHIL NFR BLD: 1.7 % (ref 0–6.9)
ERYTHROCYTE [DISTWIDTH] IN BLOOD BY AUTOMATED COUNT: 44.7 FL (ref 35.9–50)
GFR SERPLBLD CREATININE-BSD FMLA CKD-EPI: 102 ML/MIN/1.73 M 2
GLOBULIN SER CALC-MCNC: 2.9 G/DL (ref 1.9–3.5)
GLUCOSE SERPL-MCNC: 144 MG/DL (ref 65–99)
HCT VFR BLD AUTO: 43.7 % (ref 37–47)
HGB BLD-MCNC: 15.2 G/DL (ref 12–16)
IMM GRANULOCYTES # BLD AUTO: 0.04 K/UL (ref 0–0.11)
IMM GRANULOCYTES NFR BLD AUTO: 0.4 % (ref 0–0.9)
LYMPHOCYTES # BLD AUTO: 2.91 K/UL (ref 1–4.8)
LYMPHOCYTES NFR BLD: 30.8 % (ref 22–41)
MCH RBC QN AUTO: 32.3 PG (ref 27–33)
MCHC RBC AUTO-ENTMCNC: 34.8 G/DL (ref 33.6–35)
MCV RBC AUTO: 93 FL (ref 81.4–97.8)
MONOCYTES # BLD AUTO: 0.75 K/UL (ref 0–0.85)
MONOCYTES NFR BLD AUTO: 7.9 % (ref 0–13.4)
NEUTROPHILS # BLD AUTO: 5.56 K/UL (ref 2–7.15)
NEUTROPHILS NFR BLD: 58.9 % (ref 44–72)
NRBC # BLD AUTO: 0 K/UL
NRBC BLD-RTO: 0 /100 WBC
PLATELET # BLD AUTO: 246 K/UL (ref 164–446)
PMV BLD AUTO: 8.6 FL (ref 9–12.9)
POTASSIUM SERPL-SCNC: 4 MMOL/L (ref 3.6–5.5)
PROT SERPL-MCNC: 7.4 G/DL (ref 6–8.2)
RBC # BLD AUTO: 4.7 M/UL (ref 4.2–5.4)
SODIUM SERPL-SCNC: 135 MMOL/L (ref 135–145)
TROPONIN T SERPL-MCNC: <6 NG/L (ref 6–19)
WBC # BLD AUTO: 9.5 K/UL (ref 4.8–10.8)

## 2022-05-23 PROCEDURE — 80053 COMPREHEN METABOLIC PANEL: CPT

## 2022-05-23 PROCEDURE — 93005 ELECTROCARDIOGRAM TRACING: CPT

## 2022-05-23 PROCEDURE — 85025 COMPLETE CBC W/AUTO DIFF WBC: CPT

## 2022-05-23 PROCEDURE — 302449 STATCHG TRIAGE ONLY (STATISTIC)

## 2022-05-23 PROCEDURE — 84484 ASSAY OF TROPONIN QUANT: CPT

## 2022-05-23 ASSESSMENT — FIBROSIS 4 INDEX: FIB4 SCORE: 0.55

## 2022-05-24 ENCOUNTER — HOSPITAL ENCOUNTER (EMERGENCY)
Facility: MEDICAL CENTER | Age: 47
End: 2022-05-24
Payer: COMMERCIAL

## 2022-05-24 VITALS
BODY MASS INDEX: 31.39 KG/M2 | DIASTOLIC BLOOD PRESSURE: 73 MMHG | WEIGHT: 195.33 LBS | TEMPERATURE: 97.8 F | SYSTOLIC BLOOD PRESSURE: 130 MMHG | OXYGEN SATURATION: 98 % | HEIGHT: 66 IN | RESPIRATION RATE: 18 BRPM | HEART RATE: 68 BPM

## 2022-05-24 NOTE — ED TRIAGE NOTES
"Chief Complaint   Patient presents with   • Abdominal Pain   • Diarrhea   • Nausea     Pt arrives with above complaints that started at 1230 today. Pt took BP which was slightly elevated. Pt laid down to rest. Upon getting up she still felt unwell. She again took her BP which resulted at 200/134. Pt had an onset of left sided chest pain, which has since subsided. Denies current pain.    is at home with covid, pt has done well avoiding him. Took home test which resulted negative.     BP (!) 158/85   Pulse 80   Temp 36.7 °C (98 °F) (Temporal)   Resp 16   Ht 1.676 m (5' 6\")   Wt 88.6 kg (195 lb 5.2 oz)   SpO2 98%   BMI 31.53 kg/m²     "

## 2022-05-24 NOTE — ED TRIAGE NOTES
"Patient vital signs rechecked and documented per [facility]. Patient denies any new needs at this time.  Patient updated on wait times, thanked for patience. Pt informed to alert triage tech or triage RN with any needs and/or changes in condition; patient verbalized understanding. Patient stated \"no pain in my chest now. I just feel crappy.\"   "

## 2022-05-26 ENCOUNTER — HOSPITAL ENCOUNTER (OUTPATIENT)
Dept: LAB | Facility: MEDICAL CENTER | Age: 47
End: 2022-05-26
Attending: NURSE PRACTITIONER
Payer: OTHER GOVERNMENT

## 2022-05-26 ENCOUNTER — OFFICE VISIT (OUTPATIENT)
Dept: MEDICAL GROUP | Facility: PHYSICIAN GROUP | Age: 47
End: 2022-05-26

## 2022-05-26 VITALS
BODY MASS INDEX: 31.6 KG/M2 | HEART RATE: 67 BPM | TEMPERATURE: 98.4 F | WEIGHT: 196.6 LBS | HEIGHT: 66 IN | OXYGEN SATURATION: 96 % | DIASTOLIC BLOOD PRESSURE: 90 MMHG | SYSTOLIC BLOOD PRESSURE: 150 MMHG | RESPIRATION RATE: 16 BRPM

## 2022-05-26 DIAGNOSIS — E10.9 TYPE 1 DIABETES MELLITUS WITHOUT COMPLICATION (HCC): ICD-10-CM

## 2022-05-26 DIAGNOSIS — Z00.00 ANNUAL PHYSICAL EXAM: ICD-10-CM

## 2022-05-26 DIAGNOSIS — I16.9 HYPERTENSIVE CRISIS: ICD-10-CM

## 2022-05-26 DIAGNOSIS — E03.8 HYPOTHYROIDISM DUE TO HASHIMOTO'S THYROIDITIS: ICD-10-CM

## 2022-05-26 DIAGNOSIS — E06.3 HYPOTHYROIDISM DUE TO HASHIMOTO'S THYROIDITIS: ICD-10-CM

## 2022-05-26 LAB
ALBUMIN SERPL BCP-MCNC: 4.3 G/DL (ref 3.2–4.9)
ALBUMIN/GLOB SERPL: 1.7 G/DL
ALP SERPL-CCNC: 64 U/L (ref 30–99)
ALT SERPL-CCNC: 17 U/L (ref 2–50)
ANION GAP SERPL CALC-SCNC: 11 MMOL/L (ref 7–16)
AST SERPL-CCNC: 17 U/L (ref 12–45)
BILIRUB SERPL-MCNC: 0.7 MG/DL (ref 0.1–1.5)
BUN SERPL-MCNC: 13 MG/DL (ref 8–22)
CALCIUM SERPL-MCNC: 9 MG/DL (ref 8.5–10.5)
CHLORIDE SERPL-SCNC: 103 MMOL/L (ref 96–112)
CHOLEST SERPL-MCNC: 172 MG/DL (ref 100–199)
CO2 SERPL-SCNC: 22 MMOL/L (ref 20–33)
CREAT SERPL-MCNC: 0.75 MG/DL (ref 0.5–1.4)
ERYTHROCYTE [DISTWIDTH] IN BLOOD BY AUTOMATED COUNT: 45.9 FL (ref 35.9–50)
EST. AVERAGE GLUCOSE BLD GHB EST-MCNC: 154 MG/DL
GFR SERPLBLD CREATININE-BSD FMLA CKD-EPI: 100 ML/MIN/1.73 M 2
GLOBULIN SER CALC-MCNC: 2.6 G/DL (ref 1.9–3.5)
GLUCOSE SERPL-MCNC: 180 MG/DL (ref 65–99)
HBA1C MFR BLD: 7 % (ref 4–5.6)
HCT VFR BLD AUTO: 42.9 % (ref 37–47)
HDLC SERPL-MCNC: 42 MG/DL
HGB BLD-MCNC: 14.5 G/DL (ref 12–16)
LDLC SERPL CALC-MCNC: 105 MG/DL
MCH RBC QN AUTO: 32 PG (ref 27–33)
MCHC RBC AUTO-ENTMCNC: 33.8 G/DL (ref 33.6–35)
MCV RBC AUTO: 94.7 FL (ref 81.4–97.8)
PLATELET # BLD AUTO: 217 K/UL (ref 164–446)
PMV BLD AUTO: 9.2 FL (ref 9–12.9)
POTASSIUM SERPL-SCNC: 4.3 MMOL/L (ref 3.6–5.5)
PROT SERPL-MCNC: 6.9 G/DL (ref 6–8.2)
RBC # BLD AUTO: 4.53 M/UL (ref 4.2–5.4)
SODIUM SERPL-SCNC: 136 MMOL/L (ref 135–145)
T4 FREE SERPL-MCNC: 1.41 NG/DL (ref 0.93–1.7)
TRIGL SERPL-MCNC: 124 MG/DL (ref 0–149)
TSH SERPL DL<=0.005 MIU/L-ACNC: 10.6 UIU/ML (ref 0.38–5.33)
WBC # BLD AUTO: 6.6 K/UL (ref 4.8–10.8)

## 2022-05-26 PROCEDURE — 84439 ASSAY OF FREE THYROXINE: CPT

## 2022-05-26 PROCEDURE — 85027 COMPLETE CBC AUTOMATED: CPT

## 2022-05-26 PROCEDURE — 82306 VITAMIN D 25 HYDROXY: CPT

## 2022-05-26 PROCEDURE — 84443 ASSAY THYROID STIM HORMONE: CPT

## 2022-05-26 PROCEDURE — 80061 LIPID PANEL: CPT

## 2022-05-26 PROCEDURE — 36415 COLL VENOUS BLD VENIPUNCTURE: CPT

## 2022-05-26 PROCEDURE — 80053 COMPREHEN METABOLIC PANEL: CPT

## 2022-05-26 PROCEDURE — 83036 HEMOGLOBIN GLYCOSYLATED A1C: CPT

## 2022-05-26 PROCEDURE — 99214 OFFICE O/P EST MOD 30 MIN: CPT | Performed by: NURSE PRACTITIONER

## 2022-05-26 RX ORDER — LISINOPRIL 10 MG/1
5 TABLET ORAL DAILY
Qty: 60 TABLET | Refills: 0 | Status: SHIPPED | OUTPATIENT
Start: 2022-05-26 | End: 2022-09-07

## 2022-05-26 ASSESSMENT — FIBROSIS 4 INDEX: FIB4 SCORE: 0.83

## 2022-05-26 NOTE — PROGRESS NOTES
Chief Complaint   Patient presents with   • Follow-Up     Pt was feeling ill, pt had high blood pressure 200/134 went to the hospital        HISTORY OF PRESENT ILLNESS: Patient is a 47 y.o. female, established patient who presents today to discuss medical problems as listed below:    Health Maintenance:  COMPLETED     Hypertensive crisis  New problem.  Patient reports new onset high blood pressure.  She did not feel well on Monday when she was at work, nonspecific.  She went to [facility] on 5/24/22 for an assessment.  Initial blood pressure was 160/90.  During triage her blood pressure 180/90.  200/84 right before she went to ER.  Thyroid has not been checked over a year.  Currently on Synthroid 200 mcg daily.  Type I diabetic, has not been checking for yr. she had her labs drawn today.  BP today is 150/90.  Tuesday morning blood pressure was 96/50 when patient woke up.  On Tuesday was 140/76.  Denies CP, dyspnea, dizziness, HA or peripheral edema.    Type 1 diabetes mellitus without complication (HCC)  Completed labs yesterday, no results yet.  Patient to return to review results.  She was referral to endocrinology and scan scheduled to see Dr. Zhu beginning of June.      Patient Active Problem List    Diagnosis Date Noted   • Hypertensive crisis 05/26/2022   • Skin tag 05/27/2021   • Excessive sexual drive 04/06/2021   • Encounter for surveillance of contraceptive pills 04/06/2021   • Vaginal yeast infection 04/06/2021   • Encounter for long-term methadone use 04/24/2020   • Encounter for long-term (current) use of insulin (HCC) 01/17/2020   • Hyperglycemia 01/17/2020   • Hypothyroidism 01/17/2020   • Type 1 diabetes mellitus without complication (HCC) 01/17/2020   • Skin lesion 11/06/2018   • Right hand pain 04/09/2018   • Plantar fasciitis 04/09/2018   • Fatty liver 04/21/2017   • Elevated LFTs 04/20/2017   • Tonsillith 04/19/2017   • Vitamin D deficiency 01/20/2017   • IUD (intrauterine device) in  place 2017   • Chronic fatigue 2017   • 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 60 Tablet 0   • SYNTHROID 200 MCG Tab Take 1 Tablet by mouth every morning on an empty stomach. 30 Tablet 0   • insulin aspart (NOVOLOG FLEXPEN) 100 UNIT/ML injection PEN Inject 30-36 Units under the skin 3 times a day before meals. 30 mL 2     No current facility-administered medications for this visit.       Social History     Tobacco Use   • Smoking status: Former Smoker     Packs/day: 1.00     Years: 20.00     Pack years: 20.00     Types: Cigarettes     Quit date: 2013     Years since quittin.0   • Smokeless tobacco: Never Used   Vaping Use   • Vaping Use: Never used   Substance Use Topics   • Alcohol use: Yes     Alcohol/week: 0.0 oz     Comment: occasional   • Drug use: Yes     Types: Marijuana, Oral     Social History     Social History Narrative   • Not on file       Family History   Problem Relation Age of Onset   • Hypertension Mother    • Hyperlipidemia Mother    • Hypertension Father    • Cancer Maternal Grandfather         lung   • Diabetes Neg Hx    • Heart Disease Neg Hx    • Stroke Neg Hx        Allergies, past medical history, past surgical history, family history, social history reviewed and updated.    Review of Systems:     - Constitutional: Negative for fever, chills, unexpected weight change, and fatigue/generalized weakness.    - Respiratory:  "Negative for cough, sputum production, chest congestion, dyspnea, wheezing, and crackles.      - Cardiovascular: Negative for chest pain, palpitations, orthopnea, and bilateral lower extremity edema.     - Psychiatric/Behavioral: Negative for depression, suicidal/homicidal ideation and memory loss.      All other systems reviewed and are negative    Exam:    BP (!) 150/90   Pulse 67   Temp 36.9 °C (98.4 °F) (Temporal)   Resp 16   Ht 1.676 m (5' 6\")   Wt 89.2 kg (196 lb 9.6 oz)   SpO2 96%   BMI 31.73 kg/m²  Body mass index is 31.73 kg/m².    Physical Exam:  Constitutional: Well-developed and well-nourished. Not diaphoretic. No distress.   Cardiovascular: Regular rate and rhythm, S1 and S2 without murmur, rubs, or gallops.    Chest: Effort normal. Clear to auscultation throughout. No adventitious sounds.   Neurological: Alert and oriented x 3.   Psychiatric:  Behavior, mood, and affect are appropriate.  MA/nursing note and vitals reviewed.    LABS: Drawn yesterday, no results yet.    Assessment/Plan:  1. Hypertensive crisis  Uncontrolled, stable.  Suspecting fluctuations secondary to Hashimoto thyroiditis.  TSH drawn yesterday, no access to results yet.  We will follow-up with patient soon.  Due to fluctuation in blood pressure, will start lisinopril back to 10 mg as needed.  Patient to start home BP log.  Measure BP prior to taking medication, parameters given, if below 120/80 hold the dose.  - lisinopril (PRINIVIL) 10 MG Tab; Take 0.5 Tablets by mouth every day.  Dispense: 60 Tablet; Refill: 0    2. Hypothyroidism due to Hashimoto's thyroiditis  Patient had labs drawn yesterday, we will follow-up next week.  - US-THYROID; Future    3. Type 1 diabetes mellitus without complication (HCC)  Patient scheduled to see endocrinology, Dr. Zhu in June.  Patient completed labs yesterday, no data yet.  We will follow-up next week.       Discussed with patient possible alternative diagnoses, pt is to take all " medications as prescribed. If symptoms persist FU w/PCP, if symptoms worsen go to emergency room. If experiencing any side effects from prescribed medications reports to the office immediately or go to emergency room.  Reviewed indication, dosage, usage and potential adverse effects of prescribed medications. Reviewed risks and benefits of treatment plan. Patient verbalizes understanding of all instruction and verbally agrees to plan.    No follow-ups on file.

## 2022-05-26 NOTE — ASSESSMENT & PLAN NOTE
New problem.  Patient reports new onset high blood pressure.  She did not feel well on Monday when she was at work, nonspecific.  She went to [facility] on 5/24/22 for an assessment.  Initial blood pressure was 160/90.  During triage her blood pressure 180/90.  200/84 right before she went to ER.  Thyroid has not been checked over a year.  Currently on Synthroid 200 mcg daily.  Type I diabetic, has not been checking for yr. she had her labs drawn today.  BP today is 150/90.  Tuesday morning blood pressure was 96/50 when patient woke up.  On Tuesday was 140/76.  Denies CP, dyspnea, dizziness, HA or peripheral edema.

## 2022-05-26 NOTE — ASSESSMENT & PLAN NOTE
Completed labs yesterday, no results yet.  Patient to return to review results.  She was referral to endocrinology and scan scheduled to see Dr. Zhu beginning of June.

## 2022-05-29 LAB — 25(OH)D3 SERPL-MCNC: 19 NG/ML (ref 30–80)

## 2022-05-31 ENCOUNTER — OFFICE VISIT (OUTPATIENT)
Dept: MEDICAL GROUP | Facility: PHYSICIAN GROUP | Age: 47
End: 2022-05-31
Payer: OTHER GOVERNMENT

## 2022-05-31 VITALS
DIASTOLIC BLOOD PRESSURE: 70 MMHG | HEART RATE: 64 BPM | HEIGHT: 66 IN | OXYGEN SATURATION: 96 % | SYSTOLIC BLOOD PRESSURE: 130 MMHG | BODY MASS INDEX: 32.27 KG/M2 | TEMPERATURE: 97.9 F | RESPIRATION RATE: 16 BRPM | WEIGHT: 200.8 LBS

## 2022-05-31 DIAGNOSIS — I16.9 HYPERTENSIVE CRISIS: ICD-10-CM

## 2022-05-31 DIAGNOSIS — E03.8 HYPOTHYROIDISM DUE TO HASHIMOTO'S THYROIDITIS: ICD-10-CM

## 2022-05-31 DIAGNOSIS — E06.3 HYPOTHYROIDISM DUE TO HASHIMOTO'S THYROIDITIS: ICD-10-CM

## 2022-05-31 DIAGNOSIS — E10.9 TYPE 1 DIABETES MELLITUS WITHOUT COMPLICATION (HCC): ICD-10-CM

## 2022-05-31 PROCEDURE — 99214 OFFICE O/P EST MOD 30 MIN: CPT | Performed by: NURSE PRACTITIONER

## 2022-05-31 RX ORDER — LEVOTHYROXINE SODIUM 0.03 MG/1
25 TABLET ORAL
Qty: 90 TABLET | Refills: 1 | Status: CANCELLED | OUTPATIENT
Start: 2022-05-31

## 2022-05-31 ASSESSMENT — FIBROSIS 4 INDEX: FIB4 SCORE: 0.86

## 2022-06-01 NOTE — PROGRESS NOTES
Chief Complaint   Patient presents with   • Follow-Up     BP   • Lab Results       HISTORY OF PRESENT ILLNESS: Patient is a 47 y.o. female, established patient who presents today to discuss medical problems as listed below:    Health Maintenance:  COMPLETED     Hypothyroidism due to Hashimoto's thyroiditis   Latest Reference Range & Units 05/26/22 07:25   TSH 0.380 - 5.330 uIU/mL 10.600 (H) [1]   (H): Data is abnormally high  [1] Reference Range:      Pregnant Females, 1st Trimester  0.050-3.700   Pregnant Females, 2nd Trimester  0.310-4.350   Pregnant Females, 3rd Trimester  0.410-5.180     On Synthroid 200 mcg.  Was previously told to continue with brand only as generic was not effective for her.  She reports compliance.  She has an appointment scheduled with endocrinology, Dr. Zhu    Type 1 diabetes mellitus without complication (HCC)   Latest Reference Range & Units 05/26/22 07:25   Glycohemoglobin 4.0 - 5.6 % 7.0 (H) [1]   (H): Data is abnormally high  [1] Increased risk for diabetes:  5.7 -6.4%   Diabetes:  >6.4%   Glycemic control for adults with diabetes:  <7.0%      The above interpretations are per ADA guidelines.  Diagnosis   of diabetes mellitus on the basis of elevated Hemoglobin A1c   should be confirmed by repeating the Hb A1c test.     On NovoLog FlexPen 38 to 36 units 3 times daily before meals.    Hypertensive crisis  Patient is following up at her blood pressure today.  Last visit her blood pressure was 150/90 with a history of hypertensive crisis BP at 200/84.  She was started on lisinopril 5 mg daily, did not take.  BP log from home is from 110s to 130 over 60s and 70s.  Today BP is 130/70.  Denies CP, dyspnea, dizziness or ankle swelling.      Patient Active Problem List    Diagnosis Date Noted   • Hypertensive crisis 05/26/2022   • Skin tag 05/27/2021   • Excessive sexual drive 04/06/2021   • Encounter for surveillance of contraceptive pills 04/06/2021   • Vaginal yeast infection  2021   • Encounter for long-term methadone use 2020   • Encounter for long-term (current) use of insulin (HCC) 2020   • Hyperglycemia 2020   • Hypothyroidism 2020   • Type 1 diabetes mellitus without complication (HCC) 2020   • Skin lesion 2018   • Right hand pain 2018   • Plantar fasciitis 2018   • Fatty liver 2017   • Elevated LFTs 2017   • Tonsillith 2017   • Vitamin D deficiency 2017   • IUD (intrauterine device) in place 2017   • Chronic fatigue 2017   • 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 60 Tablet 0   • SYNTHROID 200 MCG Tab Take 1 Tablet by mouth every morning on an empty stomach. 30 Tablet 0   • insulin aspart (NOVOLOG FLEXPEN) 100 UNIT/ML injection PEN Inject 30-36 Units under the skin 3 times a day before meals. 30 mL 2     No current facility-administered medications for this visit.       Social History     Tobacco Use   • Smoking status: Former Smoker     Packs/day: 1.00     Years: 20.00     Pack years: 20.00     Types: Cigarettes     Quit date: 2013     Years since quittin.0   • Smokeless tobacco: Never Used   Vaping Use   • Vaping Use: Never used   Substance Use Topics   • Alcohol use: Yes     Alcohol/week: 0.0 oz     Comment: occasional   • Drug use: Yes     Types: Marijuana, Oral     Social History     Social History Narrative   • Not on file  "      Family History   Problem Relation Age of Onset   • Hypertension Mother    • Hyperlipidemia Mother    • Hypertension Father    • Cancer Maternal Grandfather         lung   • Diabetes Neg Hx    • Heart Disease Neg Hx    • Stroke Neg Hx        Allergies, past medical history, past surgical history, family history, social history reviewed and updated.    Review of Systems:     - Constitutional: Negative for fever, chills, unexpected weight change, and fatigue/generalized weakness.     - Respiratory: Negative for cough, sputum production, chest congestion, dyspnea, wheezing, and crackles.      - Cardiovascular: Negative for chest pain, palpitations, orthopnea, and bilateral lower extremity edema.    - Psychiatric/Behavioral: Negative for depression, suicidal/homicidal ideation and memory loss.      All other systems reviewed and are negative    Exam:    /70   Pulse 64   Temp 36.6 °C (97.9 °F) (Temporal)   Resp 16   Ht 1.676 m (5' 6\")   Wt 91.1 kg (200 lb 12.8 oz)   SpO2 96%   BMI 32.41 kg/m²  Body mass index is 32.41 kg/m².    Physical Exam:  Constitutional: Well-developed and well-nourished. Not diaphoretic. No distress.   Cardiovascular: Regular rate and rhythm, S1 and S2 without murmur, rubs, or gallops.    Chest: Effort normal. Clear to auscultation throughout. No adventitious sounds.   Neurological: Alert and oriented x 3.   Psychiatric:  Behavior, mood, and affect are appropriate.  MA/nursing note and vitals reviewed.    LABS: 5/2022 results reviewed and discussed with the patient, questions answered.    Assessment/Plan:  1. Hypothyroidism due to Hashimoto's thyroiditis  Uncontrolled, stable.  Will increase Synthroid to 225 mcg in the morning.  Patient has an appointment with endocrinology in 2 weeks.  We will follow-up with endocrinology    2. Type 1 diabetes mellitus without complication (HCC)  Stable with current regimen.  Patient has a follow-up with endocrinology in 2 weeks.    3. " Hypertensive crisis  Resolved.  Continue to take random blood pressure checks at home.       Discussed with patient possible alternative diagnoses, pt is to take all medications as prescribed. If symptoms persist FU w/PCP, if symptoms worsen go to emergency room. If experiencing any side effects from prescribed medications reports to the office immediately or go to emergency room.  Reviewed indication, dosage, usage and potential adverse effects of prescribed medications. Reviewed risks and benefits of treatment plan. Patient verbalizes understanding of all instruction and verbally agrees to plan.    No follow-ups on file. annual, PRN

## 2022-06-01 NOTE — ASSESSMENT & PLAN NOTE
Patient is following up at her blood pressure today.  Last visit her blood pressure was 150/90 with a history of hypertensive crisis BP at 200/84.  She was started on lisinopril 5 mg daily, did not take.  BP log from home is from 110s to 130 over 60s and 70s.  Today BP is 130/70.  Denies CP, dyspnea, dizziness or ankle swelling.

## 2022-06-01 NOTE — ASSESSMENT & PLAN NOTE
Latest Reference Range & Units 05/26/22 07:25   Glycohemoglobin 4.0 - 5.6 % 7.0 (H) [1]   (H): Data is abnormally high  [1] Increased risk for diabetes:  5.7 -6.4%  Diabetes:  >6.4%  Glycemic control for adults with diabetes:  <7.0%    The above interpretations are per ADA guidelines.  Diagnosis  of diabetes mellitus on the basis of elevated Hemoglobin A1c  should be confirmed by repeating the Hb A1c test.    On NovoLog FlexPen 38 to 36 units 3 times daily before meals.

## 2022-06-01 NOTE — ASSESSMENT & PLAN NOTE
Latest Reference Range & Units 05/26/22 07:25   TSH 0.380 - 5.330 uIU/mL 10.600 (H) [1]   (H): Data is abnormally high  [1] Reference Range:    Pregnant Females, 1st Trimester  0.050-3.700  Pregnant Females, 2nd Trimester  0.310-4.350  Pregnant Females, 3rd Trimester  0.410-5.180    On Synthroid 200 mcg.  Was previously told to continue with brand only as generic was not effective for her.  She reports compliance.  She has an appointment scheduled with endocrinology, Dr. Zhu

## 2022-07-07 ENCOUNTER — HOSPITAL ENCOUNTER (OUTPATIENT)
Dept: RADIOLOGY | Facility: MEDICAL CENTER | Age: 47
End: 2022-07-07
Attending: NURSE PRACTITIONER
Payer: OTHER GOVERNMENT

## 2022-07-07 DIAGNOSIS — E03.8 HYPOTHYROIDISM DUE TO HASHIMOTO'S THYROIDITIS: ICD-10-CM

## 2022-07-07 DIAGNOSIS — E06.3 HYPOTHYROIDISM DUE TO HASHIMOTO'S THYROIDITIS: ICD-10-CM

## 2022-07-07 PROCEDURE — 76536 US EXAM OF HEAD AND NECK: CPT

## 2022-09-07 ENCOUNTER — HOSPITAL ENCOUNTER (OUTPATIENT)
Facility: MEDICAL CENTER | Age: 47
End: 2022-09-07
Attending: NURSE PRACTITIONER
Payer: COMMERCIAL

## 2022-09-07 ENCOUNTER — OFFICE VISIT (OUTPATIENT)
Dept: MEDICAL GROUP | Facility: PHYSICIAN GROUP | Age: 47
End: 2022-09-07
Payer: COMMERCIAL

## 2022-09-07 VITALS
BODY MASS INDEX: 32.43 KG/M2 | HEART RATE: 94 BPM | WEIGHT: 201.8 LBS | TEMPERATURE: 98.1 F | HEIGHT: 66 IN | OXYGEN SATURATION: 98 % | RESPIRATION RATE: 16 BRPM

## 2022-09-07 DIAGNOSIS — R30.0 DYSURIA: ICD-10-CM

## 2022-09-07 DIAGNOSIS — N89.8 VAGINAL ITCHING: ICD-10-CM

## 2022-09-07 DIAGNOSIS — Z11.3 ROUTINE SCREENING FOR STI (SEXUALLY TRANSMITTED INFECTION): ICD-10-CM

## 2022-09-07 DIAGNOSIS — E10.9 TYPE 1 DIABETES MELLITUS WITHOUT COMPLICATION (HCC): ICD-10-CM

## 2022-09-07 DIAGNOSIS — M25.562 ACUTE PAIN OF LEFT KNEE: ICD-10-CM

## 2022-09-07 LAB
APPEARANCE UR: CLEAR
BILIRUB UR STRIP-MCNC: NEGATIVE MG/DL
COLOR UR AUTO: YELLOW
GLUCOSE UR STRIP.AUTO-MCNC: NEGATIVE MG/DL
KETONES UR STRIP.AUTO-MCNC: NEGATIVE MG/DL
LEUKOCYTE ESTERASE UR QL STRIP.AUTO: NEGATIVE
NITRITE UR QL STRIP.AUTO: NEGATIVE
PH UR STRIP.AUTO: 6 [PH] (ref 5–8)
PROT UR QL STRIP: NEGATIVE MG/DL
RBC UR QL AUTO: NEGATIVE
SP GR UR STRIP.AUTO: 1.02
UROBILINOGEN UR STRIP-MCNC: 0.2 MG/DL

## 2022-09-07 PROCEDURE — 87480 CANDIDA DNA DIR PROBE: CPT

## 2022-09-07 PROCEDURE — 87591 N.GONORRHOEAE DNA AMP PROB: CPT

## 2022-09-07 PROCEDURE — 99214 OFFICE O/P EST MOD 30 MIN: CPT | Performed by: NURSE PRACTITIONER

## 2022-09-07 PROCEDURE — 82570 ASSAY OF URINE CREATININE: CPT

## 2022-09-07 PROCEDURE — 81002 URINALYSIS NONAUTO W/O SCOPE: CPT | Performed by: NURSE PRACTITIONER

## 2022-09-07 PROCEDURE — 87510 GARDNER VAG DNA DIR PROBE: CPT

## 2022-09-07 PROCEDURE — 82043 UR ALBUMIN QUANTITATIVE: CPT

## 2022-09-07 PROCEDURE — 87491 CHLMYD TRACH DNA AMP PROBE: CPT

## 2022-09-07 PROCEDURE — 87660 TRICHOMONAS VAGIN DIR PROBE: CPT

## 2022-09-07 RX ORDER — FLUCONAZOLE 150 MG/1
150 TABLET ORAL DAILY
Qty: 1 TABLET | Refills: 1 | Status: SHIPPED | OUTPATIENT
Start: 2022-09-07 | End: 2022-09-08

## 2022-09-07 ASSESSMENT — FIBROSIS 4 INDEX: FIB4 SCORE: 0.86

## 2022-09-07 NOTE — PROGRESS NOTES
Chief Complaint   Patient presents with    Follow-Up     Little bit of discharged, itchy and burning and no yeast infection x last wk       HISTORY OF PRESENT ILLNESS: Patient is a 47 y.o. female, established patient who presents today to discuss medical problems as listed below:    Health Maintenance:  COMPLETED     Dysuria  New problem.  Dysuria and vaginal itching x1 week.  No fever, no flank pain, no pelvic pain, no blood in urine, abnormal vaginal odor.  Acknowledges abnormal discharge, clear in color.  Taking anything over-the-counter.  Not prone to UTIs.  Patient is interested in STI check, chlamydia gonorrhea.  HX of type 1 DM, Most recent A1c is 7, 3 months ago.  Patient reports 6.8 from a few weeks ago from endocrinologist office.    Acute pain of left knee  New problem problem.  Left knee popped 4 weeks ago patient was able to pop it back in, started to hurt 2 weeks ago.  Painful when walks.  No pain at rest.  Not taking anything for pain    Patient Active Problem List    Diagnosis Date Noted    Dysuria 09/07/2022    Acute pain of left knee 09/07/2022    Hypertensive crisis 05/26/2022    Skin tag 05/27/2021    Excessive sexual drive 04/06/2021    Encounter for surveillance of contraceptive pills 04/06/2021    Vaginal yeast infection 04/06/2021    Encounter for long-term methadone use 04/24/2020    Encounter for long-term (current) use of insulin (HCC) 01/17/2020    Hyperglycemia 01/17/2020    Hypothyroidism 01/17/2020    Type 1 diabetes mellitus without complication (HCC) 01/17/2020    Skin lesion 11/06/2018    Right hand pain 04/09/2018    Plantar fasciitis 04/09/2018    Fatty liver 04/21/2017    Elevated LFTs 04/20/2017    Tonsillith 04/19/2017    Vitamin D deficiency 01/20/2017    IUD (intrauterine device) in place 01/17/2017    Chronic fatigue 01/17/2017    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 Okay to repeat one week later if symptoms persist 1 Tablet 1    SYNTHROID 200 MCG Tab Take 1 Tablet by mouth every morning on an empty stomach. 90 Tablet 0    SYNTHROID 25 MCG Tab Take 1 Tablet by mouth every morning on an empty stomach. In addition to 200mcg tablet 90 Tablet 0    insulin aspart (NOVOLOG FLEXPEN) 100 UNIT/ML injection PEN Inject 30-36 Units under the skin 3 times a day before meals. 30 mL 2     No current facility-administered medications for this visit.       Social History     Tobacco Use    Smoking status: Former     Packs/day: 1.00     Years: 20.00     Pack years: 20.00     Types: Cigarettes     Quit date: 2013     Years since quittin.3    Smokeless tobacco: Never   Vaping Use    Vaping Use: Never used   Substance Use Topics    Alcohol use: Yes     Alcohol/week: 0.0 oz     Comment: occasional    Drug use: Yes     Types: Marijuana, Oral     Social History     Social History Narrative    Not on file       Family History   Problem Relation Age of Onset    Hypertension Mother     Hyperlipidemia Mother     Hypertension Father     Cancer Maternal Grandfather         lung    Diabetes Neg Hx     Heart Disease Neg Hx     Stroke Neg Hx        Allergies, past medical history, past surgical history, family history, social history reviewed and updated.    Review of Systems:     - Constitutional: Negative for fever, chills, unexpected weight change, and fatigue/generalized weakness.     - Respiratory: Negative for cough, sputum production, chest  "congestion, dyspnea, wheezing, and crackles.      - Cardiovascular: Negative for chest pain, palpitations, orthopnea, and bilateral lower extremity edema.     - Genitourinary: vaginal burning and itching. Negative for dysuria, polyuria, hematuria, pyuria, urinary urgency, and urinary incontinence.      - Psychiatric/Behavioral: Negative for depression, suicidal/homicidal ideation and memory loss.      All other systems reviewed and are negative    Exam:    BP (P) 132/70   Pulse 94   Temp 36.7 °C (98.1 °F) (Temporal)   Resp 16   Ht 1.676 m (5' 6\")   Wt 91.5 kg (201 lb 12.8 oz)   SpO2 98%   BMI 32.57 kg/m²  Body mass index is 32.57 kg/m².    Physical Exam:  Constitutional: Well-developed and well-nourished. Not diaphoretic. No distress.   Cardiovascular: Regular rate and rhythm, S1 and S2 without murmur, rubs, or gallops.    Chest: Effort normal. Clear to auscultation throughout. No adventitious sounds. No CVA tenderness.  Musculoskeletal: L knee pain.  Drawer Lachemann's and Makari test is negative, no popping noted all major joints AROM full in all directions without pain.  Neurological: Alert and oriented x 3.   Psychiatric:  Behavior, mood, and affect are appropriate.  MA/nursing note and vitals reviewed.    Assessment/Plan:  1. Dysuria  - POCT Urinalysis  - Chlamydia/GC, PCR (Urine); Future  - VAGINAL PATHOGENS DNA PANEL; Future    2. Vaginal itching  - POCT Urinalysis  - Chlamydia/GC, PCR (Urine); Future  - VAGINAL PATHOGENS DNA PANEL; Future  - fluconazole (DIFLUCAN) 150 MG tablet; Take 1 Tablet by mouth every day for 1 day. Okay to repeat one week later if symptoms persist  Dispense: 1 Tablet; Refill: 1    3. Routine screening for STI (sexually transmitted infection)  - Chlamydia/GC, PCR (Urine); Future  - VAGINAL PATHOGENS DNA PANEL; Future  - T.PALLIDUM AB EIA; Future    4. Type 1 diabetes mellitus without complication (HCC)  Followed by endocrinology  - MICROALBUMIN CREAT RATIO URINE; Future    5. " Acute pain of left knee  Advise to wear a brace, will obtain imaging  - DX-KNEE 2- LEFT; Future       Discussed with patient possible alternative diagnoses, pt is to take all medications as prescribed. If symptoms persist FU w/PCP, if symptoms worsen go to emergency room. If experiencing any side effects from prescribed medications reports to the office immediately or go to emergency room.  Reviewed indication, dosage, usage and potential adverse effects of prescribed medications. Reviewed risks and benefits of treatment plan. Patient verbalizes understanding of all instruction and verbally agrees to plan.    No follow-ups on file. 2 wks

## 2022-09-07 NOTE — ASSESSMENT & PLAN NOTE
New problem.  Dysuria and vaginal itching x1 week.  No fever, no flank pain, no pelvic pain, no blood in urine, abnormal vaginal odor.  Acknowledges abnormal discharge, clear in color.  Taking anything over-the-counter.  Not prone to UTIs.  Patient is interested in STI check, chlamydia gonorrhea.  HX of type 1 DM, Most recent A1c is 7, 3 months ago.  Patient reports 6.8 from a few weeks ago from endocrinologist office.  She also would like full STI panel.

## 2022-09-07 NOTE — ASSESSMENT & PLAN NOTE
New problem problem.  Left knee popped 4 weeks ago patient was able to pop it back in, started to hurt 2 weeks ago.  Painful when walks.  No pain at rest.  Not taking anything for pain

## 2022-09-08 LAB
C TRACH DNA SPEC QL NAA+PROBE: NEGATIVE
CANDIDA DNA VAG QL PROBE+SIG AMP: NEGATIVE
CREAT UR-MCNC: 106.77 MG/DL
G VAGINALIS DNA VAG QL PROBE+SIG AMP: NEGATIVE
MICROALBUMIN UR-MCNC: <1.2 MG/DL
MICROALBUMIN/CREAT UR: NORMAL MG/G (ref 0–30)
N GONORRHOEA DNA SPEC QL NAA+PROBE: NEGATIVE
SPECIMEN SOURCE: NORMAL
T VAGINALIS DNA VAG QL PROBE+SIG AMP: NEGATIVE

## 2023-03-15 ENCOUNTER — OFFICE VISIT (OUTPATIENT)
Dept: MEDICAL GROUP | Facility: MEDICAL CENTER | Age: 48
End: 2023-03-15
Payer: COMMERCIAL

## 2023-03-15 VITALS
WEIGHT: 175.8 LBS | DIASTOLIC BLOOD PRESSURE: 52 MMHG | TEMPERATURE: 98.6 F | HEART RATE: 75 BPM | OXYGEN SATURATION: 95 % | HEIGHT: 66 IN | SYSTOLIC BLOOD PRESSURE: 100 MMHG | BODY MASS INDEX: 28.25 KG/M2

## 2023-03-15 DIAGNOSIS — Z12.11 SCREEN FOR COLON CANCER: ICD-10-CM

## 2023-03-15 DIAGNOSIS — E10.9 TYPE 1 DIABETES MELLITUS WITHOUT COMPLICATION (HCC): ICD-10-CM

## 2023-03-15 DIAGNOSIS — L98.9 SKIN LESION: ICD-10-CM

## 2023-03-15 DIAGNOSIS — T78.00XA ALLERGY WITH ANAPHYLAXIS DUE TO FOOD: ICD-10-CM

## 2023-03-15 DIAGNOSIS — Z30.41 ENCOUNTER FOR SURVEILLANCE OF CONTRACEPTIVE PILLS: ICD-10-CM

## 2023-03-15 DIAGNOSIS — Z11.3 ROUTINE SCREENING FOR STI (SEXUALLY TRANSMITTED INFECTION): ICD-10-CM

## 2023-03-15 DIAGNOSIS — Z12.31 ENCOUNTER FOR SCREENING MAMMOGRAM FOR MALIGNANT NEOPLASM OF BREAST: ICD-10-CM

## 2023-03-15 LAB
POCT INT CON NEG: NEGATIVE
POCT INT CON POS: POSITIVE
POCT URINE PREGNANCY TEST: NEGATIVE

## 2023-03-15 PROCEDURE — 99214 OFFICE O/P EST MOD 30 MIN: CPT | Performed by: NURSE PRACTITIONER

## 2023-03-15 PROCEDURE — 81025 URINE PREGNANCY TEST: CPT | Performed by: NURSE PRACTITIONER

## 2023-03-15 RX ORDER — EPINEPHRINE 0.3 MG/.3ML
0.3 INJECTION SUBCUTANEOUS ONCE
Qty: 1 EACH | Refills: 1 | Status: SHIPPED | OUTPATIENT
Start: 2023-03-15 | End: 2023-03-15

## 2023-03-15 RX ORDER — DROSPIRENONE AND ETHINYL ESTRADIOL 0.02-3(28)
1 KIT ORAL DAILY
Qty: 84 TABLET | Refills: 3 | Status: SHIPPED | OUTPATIENT
Start: 2023-03-15 | End: 2023-09-05

## 2023-03-15 RX ORDER — EPINEPHRINE 0.3 MG/.3ML
0.3 INJECTION SUBCUTANEOUS ONCE
COMMUNITY
End: 2023-03-15 | Stop reason: SDUPTHER

## 2023-03-15 ASSESSMENT — FIBROSIS 4 INDEX: FIB4 SCORE: 0.86

## 2023-03-15 ASSESSMENT — PATIENT HEALTH QUESTIONNAIRE - PHQ9: CLINICAL INTERPRETATION OF PHQ2 SCORE: 0

## 2023-03-15 NOTE — ASSESSMENT & PLAN NOTE
New problem. Noted a small lesion on upper back shoulder x 2 wks ago, round pink unicolor, no pain or discharge. Would like a referral to derm. No personal or family hx of skin ca.

## 2023-03-15 NOTE — ASSESSMENT & PLAN NOTE
Chronic problem. Type 1 DM, followed by endocrinology Dr. Zhu. Prefers all DM screening completed at her endocrinologist.

## 2023-03-15 NOTE — ASSESSMENT & PLAN NOTE
Pt is here to discuss contraception options. She had IUD in the past, did not work for her, she was having vaginal discharge. Last time utilized Simin and would like to restart. No personal or FH od MI, stroke, DVT, migraines with aura.

## 2023-03-15 NOTE — PROGRESS NOTES
Chief Complaint   Patient presents with    Contraception    Other     sti       HISTORY OF PRESENT ILLNESS: Patient is a 47 y.o. female, established patient who presents today to discuss medical problems as listed below:    Health Maintenance:  COMPLETED       Allergies: Bee venom, Codeine, Other drug, Penicillins, Tree nuts food allergy, Bee, Influenza vaccines, Ivp [iodine], Other food, and Tape    Current Outpatient Medications   Medication Sig Dispense Refill    drospirenone-ethinyl estradiol (AUTUMN) 3-0.02 MG per tablet Take 1 Tablet by mouth every day. 84 Tablet 3    EPINEPHrine (EPIPEN) 0.3 MG/0.3ML Solution Auto-injector solution for injection Inject 0.3 mL into the shoulder, thigh, or buttocks one time for 1 dose. 1 Each 1    SYNTHROID 200 MCG Tab Take 1 Tablet by mouth every morning on an empty stomach. 90 Tablet 0    SYNTHROID 25 MCG Tab Take 1 Tablet by mouth every morning on an empty stomach. In addition to 200mcg tablet 90 Tablet 0    insulin aspart (NOVOLOG FLEXPEN) 100 UNIT/ML injection PEN Inject 30-36 Units under the skin 3 times a day before meals. 30 mL 2     No current facility-administered medications for this visit.       Allergies, past medical history, past surgical history, family history, social history reviewed and updated.    Review of Systems:     - Constitutional: Negative for fever, chills, unexpected weight change, and fatigue/generalized weakness.     - HEENT: Negative for headaches, vision changes, hearing changes, ear pain, ear discharge, rhinorrhea, sinus congestion, sore throat, and neck pain.      - Respiratory: Negative for cough, sputum production, chest congestion, dyspnea, wheezing, and crackles.      - Cardiovascular: Negative for chest pain, palpitations, orthopnea, and bilateral lower extremity edema.     - Gastrointestinal: Negative for heartburn, nausea, vomiting, abdominal pain, hematochezia, melena, diarrhea, constipation, and greasy/foul-smelling stools.     -  "Genitourinary: Negative for dysuria, polyuria, hematuria, pyuria, urinary urgency, and urinary incontinence.    - Musculoskeletal: Negative for myalgias, back pain, and joint pain.     - Skin: skin lesion    - Neurological: Negative for dizziness, tingling, tremors, focal sensory deficit, focal weakness and headaches.     - Endo/Heme/Allergies: Does not bruise/bleed easily.     - Psychiatric/Behavioral: Negative for depression, suicidal/homicidal ideation and memory loss.      All other systems reviewed and are negative    Exam:    /52 (BP Location: Left arm, Patient Position: Sitting, BP Cuff Size: Adult)   Pulse 75   Temp 37 °C (98.6 °F) (Temporal)   Ht 1.676 m (5' 6\")   Wt 79.7 kg (175 lb 12.8 oz)   SpO2 95%   BMI 28.37 kg/m²  Body mass index is 28.37 kg/m².    Physical Exam:  Constitutional: Well-developed and well-nourished. Not diaphoretic. No distress.   Skin: Skin is warm and dry. Small round pink lesion on top right shoulder, no excoriations.   Head: Atraumatic without lesions.  Eyes: Conjunctivae and extraocular motions are normal. Pupils are equal, round, and reactive to light. No scleral icterus.   Ears:  External ears unremarkable. Tympanic membranes clear and intact.  Nose: Nares patent. Septum midline. Turbinates without erythema nor edema. No discharge.   Mouth/Throat: Dentition is normal. Tongue normal. Oropharynx is clear and moist. Posterior pharynx without erythema or exudates.  Neck: Supple, trachea midline. Normal range of motion. No thyromegaly present. No lymphadenopathy--cervical or supraclavicular.  Cardiovascular: Regular rate and rhythm, S1 and S2 without murmur, rubs, or gallops.    Chest: Effort normal. Clear to auscultation throughout. No adventitious sounds. No CVA tenderness.  Abdomen: Soft, non tender, and without distention. Active bowel sounds in all four quadrants. No rebound, guarding, masses or HSM.  : Negative for dysuria, polyuria, hematuria, pyuria, urinary " urgency, and urinary incontinence.  Extremities: No cyanosis, clubbing, erythema, nor edema. Distal pulses intact and symmetric.   Musculoskeletal: All major joints AROM full in all directions without pain.  Neurological: Alert and oriented x 3. DTRs 2+/3 and symmetric. No cranial nerve deficit. 5/5 myotomes. Sensation intact. Negative Rhomberg.  Psychiatric:  Behavior, mood, and affect are appropriate.  MA/nursing note and vitals reviewed.    Assessment/Plan:  Encounter for surveillance of contraceptive pills  Pt is here to discuss contraception options. She had IUD in the past, did not work for her, she was having vaginal discharge. Last time utilized Autumn and would like to restart. No personal or FH od MI, stroke, DVT, migraines with aura.     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 Avoiding triggers. Needing epi pen refilled as previous one .     Skin lesion  New problem. Noted a small lesion on upper back shoulder x 2 wks ago, round pink unicolor, no pain or discharge. Would like a referral to derm. No personal or family hx of skin ca.    Type 1 diabetes mellitus without complication (HCC)  Chronic problem. Type 1 DM, followed by endocrinology Dr. Zhu. Prefers all DM screening completed at her endocrinologist.    Routine screening for STI (sexually transmitted infection)  Pt is requesting STI screen. She is going thorough a divorce. She has no concerns or symptoms however would like to start fresh.     1. Encounter for surveillance of contraceptive pills  - POCT Pregnancy    2. Routine screening for STI (sexually transmitted infection)  - drospirenone-ethinyl estradiol (AUTUMN) 3-0.02 MG per tablet; Take 1 Tablet by mouth every day.  Dispense: 84 Tablet; Refill: 3  - Chlamydia/GC, PCR (Urine); Future  - T.PALLIDUM AB VLAD (SCREENING); Future  - HEP C VIRUS ANTIBODY; Future  - HEP B SURFACE AB; Future    3. Screen for colon cancer  - COLOGUARD (FIT DNA)    4. Encounter for screening  mammogram for malignant neoplasm of breast  - MA-SCREENING MAMMO BILAT W/TOMOSYNTHESIS W/CAD; Future    5. Skin lesion  - Referral to Dermatology    6. 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  Dispense: 1 Each; Refill: 1      Discussed with patient possible alternative diagnoses, patient is to take all medications as prescribed.      If symptoms persist FU w/PCP, if symptoms worsen go to emergency room.      If experiencing any side effects from prescribed medications report to the office immediately or go to emergency room.     Reviewed indication, dosage, usage and potential adverse effects of prescribed medications.      Reviewed risks and benefits of treatment plan. Patient verbalizes understanding of all instruction and verbally agrees to plan.     Discussed plan with the patient, and patient agrees to the above.      I personally reviewed prior external notes and test results pertinent to today's visit.      No follow-ups on file. Annual, PRN

## 2023-03-15 NOTE — ASSESSMENT & PLAN NOTE
Pt is requesting STI screen. She is going thorough a divorce. She has no concerns or symptoms however would like to start fresh.

## 2023-03-22 ENCOUNTER — HOSPITAL ENCOUNTER (OUTPATIENT)
Dept: RADIOLOGY | Facility: MEDICAL CENTER | Age: 48
End: 2023-03-22
Attending: NURSE PRACTITIONER
Payer: COMMERCIAL

## 2023-03-22 DIAGNOSIS — Z12.31 ENCOUNTER FOR SCREENING MAMMOGRAM FOR MALIGNANT NEOPLASM OF BREAST: ICD-10-CM

## 2023-03-22 PROCEDURE — 77063 BREAST TOMOSYNTHESIS BI: CPT

## 2023-03-28 DIAGNOSIS — Z11.3 ROUTINE SCREENING FOR STI (SEXUALLY TRANSMITTED INFECTION): ICD-10-CM

## 2023-04-05 ENCOUNTER — HOSPITAL ENCOUNTER (OUTPATIENT)
Dept: LAB | Facility: MEDICAL CENTER | Age: 48
End: 2023-04-05
Attending: NURSE PRACTITIONER
Payer: COMMERCIAL

## 2023-04-05 PROCEDURE — 86803 HEPATITIS C AB TEST: CPT

## 2023-04-05 PROCEDURE — 87591 N.GONORRHOEAE DNA AMP PROB: CPT

## 2023-04-05 PROCEDURE — 36415 COLL VENOUS BLD VENIPUNCTURE: CPT

## 2023-04-05 PROCEDURE — 86706 HEP B SURFACE ANTIBODY: CPT

## 2023-04-05 PROCEDURE — 86780 TREPONEMA PALLIDUM: CPT

## 2023-04-05 PROCEDURE — 87491 CHLMYD TRACH DNA AMP PROBE: CPT

## 2023-04-06 LAB
C TRACH DNA SPEC QL NAA+PROBE: NEGATIVE
HBV SURFACE AB SERPL IA-ACNC: <3.5 MIU/ML (ref 0–10)
HCV AB SER QL: NORMAL
N GONORRHOEA DNA SPEC QL NAA+PROBE: NEGATIVE
SPECIMEN SOURCE: NORMAL
T PALLIDUM AB SER QL IA: NORMAL

## 2023-04-10 ENCOUNTER — OFFICE VISIT (OUTPATIENT)
Dept: URGENT CARE | Facility: CLINIC | Age: 48
End: 2023-04-10
Payer: COMMERCIAL

## 2023-04-10 VITALS
OXYGEN SATURATION: 97 % | DIASTOLIC BLOOD PRESSURE: 74 MMHG | HEIGHT: 66 IN | WEIGHT: 173 LBS | RESPIRATION RATE: 18 BRPM | TEMPERATURE: 98.7 F | BODY MASS INDEX: 27.8 KG/M2 | SYSTOLIC BLOOD PRESSURE: 122 MMHG | HEART RATE: 86 BPM

## 2023-04-10 DIAGNOSIS — T36.95XA ANTIBIOTIC-INDUCED YEAST INFECTION: ICD-10-CM

## 2023-04-10 DIAGNOSIS — B37.9 ANTIBIOTIC-INDUCED YEAST INFECTION: ICD-10-CM

## 2023-04-10 DIAGNOSIS — J03.00 STREPTOCOCCAL TONSILLITIS: ICD-10-CM

## 2023-04-10 LAB — S PYO DNA SPEC NAA+PROBE: DETECTED

## 2023-04-10 PROCEDURE — 99213 OFFICE O/P EST LOW 20 MIN: CPT | Performed by: PHYSICIAN ASSISTANT

## 2023-04-10 PROCEDURE — 87651 STREP A DNA AMP PROBE: CPT | Performed by: PHYSICIAN ASSISTANT

## 2023-04-10 RX ORDER — CLINDAMYCIN HYDROCHLORIDE 300 MG/1
300 CAPSULE ORAL 3 TIMES DAILY
Qty: 30 CAPSULE | Refills: 0 | Status: SHIPPED | OUTPATIENT
Start: 2023-04-10 | End: 2023-04-17 | Stop reason: SINTOL

## 2023-04-10 RX ORDER — FLUCONAZOLE 100 MG/1
TABLET ORAL
Qty: 2 TABLET | Refills: 0 | Status: SHIPPED | OUTPATIENT
Start: 2023-04-10 | End: 2023-04-17

## 2023-04-10 ASSESSMENT — FIBROSIS 4 INDEX: FIB4 SCORE: 0.87

## 2023-04-10 NOTE — PROGRESS NOTES
"Subjective:   Aleena Diana is a 48 y.o. female who presents for Pharyngitis (Sore throat.  Post nasal drip. /X 2 days. No fever. Right ear,)      HPI  The patient presents to the Urgent Care with complaint of a sore throat.  Onset yesterday.  Hurts to swallow.  No pain at rest.  Handling oral secretions and tolerating fluids well.  Earlier, her right tonsil was bleeding.  This resolved after warm salt water gargles.  Associated headache and chills.  Right side of the sore throat is worse than the left.  Radiation pain to the right ear.  No other symptoms.  Denies any fever, runny nose, cough, shortness of breath, vomiting, diarrhea.       Medications:    drospirenone-ethinyl estradiol  NovoLOG FlexPen Sopn  Synthroid Tabs    Allergies: Bee venom, Codeine, Other drug, Penicillins, Tree nuts food allergy, Bee, Influenza vaccines, Ivp [iodine], Other food, and Tape    Problem List: Aleena Diana does not have any pertinent problems on file.    Surgical History:  Past Surgical History:   Procedure Laterality Date    APPENDECTOMY      CHOLECYSTECTOMY      PRIMARY C SECTION         Past Social Hx: Aleena Diana  reports that she quit smoking about 9 years ago. Her smoking use included cigarettes. She has a 20.00 pack-year smoking history. She has never used smokeless tobacco. She reports current alcohol use. She reports current drug use. Drugs: Marijuana and Oral.     Past Family Hx:  Aleena Diana family history includes Cancer in her maternal grandfather; Hyperlipidemia in her mother; Hypertension in her father and mother.     Problem list, medications, and allergies reviewed by myself today in Epic.     Objective:     /74 (BP Location: Right arm, Patient Position: Sitting, BP Cuff Size: Adult)   Pulse 86   Temp 37.1 °C (98.7 °F) (Temporal)   Resp 18   Ht 1.676 m (5' 6\")   Wt 78.5 kg (173 lb)   SpO2 97%   BMI 27.92 kg/m²     Physical Exam  Vitals reviewed. "   Constitutional:       General: She is not in acute distress.     Appearance: Normal appearance. She is not ill-appearing or toxic-appearing.   HENT:      Right Ear: Tympanic membrane and ear canal normal.      Left Ear: Tympanic membrane and ear canal normal.      Mouth/Throat:      Mouth: Mucous membranes are moist.      Pharynx: Uvula midline. Pharyngeal swelling and posterior oropharyngeal erythema present. No oropharyngeal exudate or uvula swelling.      Tonsils: No tonsillar exudate or tonsillar abscesses. 2+ on the right. 2+ on the left.   Eyes:      Conjunctiva/sclera: Conjunctivae normal.      Pupils: Pupils are equal, round, and reactive to light.   Cardiovascular:      Rate and Rhythm: Normal rate and regular rhythm.      Heart sounds: Normal heart sounds.   Pulmonary:      Effort: Pulmonary effort is normal.      Breath sounds: Normal breath sounds.   Musculoskeletal:      Cervical back: Neck supple. No rigidity.   Lymphadenopathy:      Cervical: Cervical adenopathy present.      Right cervical: Superficial cervical adenopathy present.      Left cervical: Superficial cervical adenopathy present.   Skin:     General: Skin is warm and dry.   Neurological:      General: No focal deficit present.      Mental Status: She is alert and oriented to person, place, and time.   Psychiatric:         Mood and Affect: Mood normal.         Behavior: Behavior normal.     Strep A: POSITIVE     Diagnosis and associated orders:     1. Streptococcal tonsillitis  - POCT GROUP A STREP, PCR  - clindamycin (CLEOCIN) 300 MG Cap; Take 1 Capsule by mouth 3 times a day for 10 days.  Dispense: 30 Capsule; Refill: 0       Comments/MDM:     Start clindamycin.   Recommend warm salt water gargles, soft foods, cool liquids, ibuprofen and Tylenol for any pain or fevers.   Return to the urgent care if symptoms are not improving or any worsening symptoms or concerns. Present to the emergency room or call 911 if any severe swelling of the  throat, difficulty swallowing, difficulty breathing, wheezing, or any other severe concerns.      I personally reviewed prior external notes and test results pertinent to today's visit. Pathogenesis of diagnosis discussed including typical length and natural progression. Supportive care, natural history, differential diagnoses, and indications for immediate follow-up discussed. Patient expresses understanding and agrees to plan. Patient denies any other questions or concerns.     Follow-up with the primary care physician for recheck, reevaluation, and consideration of further management.    Please note that this dictation was created using voice recognition software. I have made a reasonable attempt to correct obvious errors, but I expect that there are errors of grammar and possibly content that I did not discover before finalizing the note.    This note was electronically signed by Reggie Jacob PA-C

## 2023-04-17 ENCOUNTER — OFFICE VISIT (OUTPATIENT)
Dept: URGENT CARE | Facility: PHYSICIAN GROUP | Age: 48
End: 2023-04-17
Payer: OTHER MISCELLANEOUS

## 2023-04-17 VITALS
HEIGHT: 66 IN | DIASTOLIC BLOOD PRESSURE: 58 MMHG | WEIGHT: 165 LBS | SYSTOLIC BLOOD PRESSURE: 110 MMHG | TEMPERATURE: 98.6 F | OXYGEN SATURATION: 97 % | BODY MASS INDEX: 26.52 KG/M2 | HEART RATE: 68 BPM | RESPIRATION RATE: 20 BRPM

## 2023-04-17 DIAGNOSIS — B37.9 ANTIBIOTIC-INDUCED YEAST INFECTION: ICD-10-CM

## 2023-04-17 DIAGNOSIS — T88.7XXA MEDICATION SIDE EFFECT: ICD-10-CM

## 2023-04-17 DIAGNOSIS — T36.95XA ANTIBIOTIC-INDUCED YEAST INFECTION: ICD-10-CM

## 2023-04-17 DIAGNOSIS — J03.00 STREPTOCOCCAL TONSILLITIS: ICD-10-CM

## 2023-04-17 PROCEDURE — 99213 OFFICE O/P EST LOW 20 MIN: CPT | Performed by: STUDENT IN AN ORGANIZED HEALTH CARE EDUCATION/TRAINING PROGRAM

## 2023-04-17 RX ORDER — FLUCONAZOLE 150 MG/1
150 TABLET ORAL DAILY
Qty: 1 TABLET | Refills: 0 | Status: SHIPPED | OUTPATIENT
Start: 2023-04-17 | End: 2023-04-18

## 2023-04-17 RX ORDER — AZITHROMYCIN 250 MG/1
TABLET, FILM COATED ORAL
Qty: 6 TABLET | Refills: 0 | Status: SHIPPED | OUTPATIENT
Start: 2023-04-17 | End: 2023-04-21

## 2023-04-17 RX ORDER — EPINEPHRINE 0.3 MG/.3ML
INJECTION SUBCUTANEOUS
COMMUNITY
Start: 2023-03-15

## 2023-04-17 ASSESSMENT — FIBROSIS 4 INDEX: FIB4 SCORE: 0.87

## 2023-04-17 NOTE — PROGRESS NOTES
Subjective:   Aleena Diana is a 48 y.o. female who presents for Pharyngitis (Fatigue,x1 week)      HPI:  Pleasant 48-year-old female presents to the urgent care for reevaluation of strep throat and new onset rash to her chest.  She was last seen in urgent care on 4/10/2023.  Diagnosed with strep throat through PCR testing.  Patient was started on clindamycin due to penicillin allergy.  Patient states that this was her first time taking clindamycin.  She states that she has taken 6 days this medication but started getting a rash to her chest.  She states the rash is red and slightly itchy.  She does report improvement in her sore throat but is still present at this time.  Denies fever, chills, nausea, vomiting, throat swelling, shortness of breath, chest pain, palpitations, lower leg swelling, dizziness, headache, abdominal pain, diarrhea.      Medications:    azithromycin Tabs  drospirenone-ethinyl estradiol  EPINEPHrine Soaj  fluconazole Tabs  NovoLOG FlexPen Sopn  Synthroid Tabs    Allergies: Bee venom, Codeine, Other drug, Penicillins, Tree nuts food allergy, Bee, Clindamycin, Influenza vaccines, Ivp [iodine], Other food, and Tape    Problem List: Aleena Diana does not have any pertinent problems on file.    Surgical History:  Past Surgical History:   Procedure Laterality Date    APPENDECTOMY      CHOLECYSTECTOMY      PRIMARY C SECTION         Past Social Hx: Aleena Diana  reports that she quit smoking about 9 years ago. Her smoking use included cigarettes. She has a 20.00 pack-year smoking history. She has never used smokeless tobacco. She reports current alcohol use. She reports current drug use. Drugs: Marijuana and Oral.     Past Family Hx:  Aleena Diana family history includes Cancer in her maternal grandfather; Hyperlipidemia in her mother; Hypertension in her father and mother.     Problem list, medications, and allergies reviewed by myself today in Epic.      "Objective:     /58 (BP Location: Right arm, Patient Position: Sitting, BP Cuff Size: Adult)   Pulse 68   Temp 37 °C (98.6 °F) (Temporal)   Resp 20   Ht 1.676 m (5' 6\")   Wt 74.8 kg (165 lb)   SpO2 97%   BMI 26.63 kg/m²     Physical Exam  Vitals reviewed.   Constitutional:       General: She is not in acute distress.     Appearance: Normal appearance.   HENT:      Head: Normocephalic.      Right Ear: Tympanic membrane, ear canal and external ear normal.      Left Ear: Tympanic membrane, ear canal and external ear normal.      Nose: Nose normal. No congestion or rhinorrhea.      Mouth/Throat:      Mouth: Mucous membranes are moist.      Pharynx: Uvula midline. Posterior oropharyngeal erythema present.      Tonsils: No tonsillar exudate or tonsillar abscesses. 1+ on the right. 1+ on the left.   Eyes:      Conjunctiva/sclera: Conjunctivae normal.      Pupils: Pupils are equal, round, and reactive to light.   Cardiovascular:      Rate and Rhythm: Normal rate and regular rhythm.      Pulses: Normal pulses.      Heart sounds: Normal heart sounds. No murmur heard.  Pulmonary:      Effort: Pulmonary effort is normal. No respiratory distress.      Breath sounds: Normal breath sounds. No stridor. No wheezing, rhonchi or rales.   Musculoskeletal:      Cervical back: Normal range of motion.   Lymphadenopathy:      Cervical: No cervical adenopathy.   Skin:     General: Skin is warm and dry.      Capillary Refill: Capillary refill takes less than 2 seconds.      Comments: Urticarial rash present to the anterior chest.   Neurological:      Mental Status: She is alert.       Assessment/Plan:     Diagnosis and associated orders:     1. Streptococcal tonsillitis  azithromycin (ZITHROMAX) 250 MG Tab      2. Medication side effect           Comments/MDM:     Patient's presentation physical exam findings are consistent with improving streptococcal tonsillitis.  She did start developing an urticarial rash on her chest after " starting her clindamycin.  We will discontinue clindamycin at this time due to side effects/possible allergy.  We will start azithromycin.  Patient is agreeable to this.  She denies any known allergies medication and states that she has had in the past and tolerated well.  Physical exam does show improvement in her strep throat.  Tonsils are less swollen compared to her last visit.  She also reports improvement in her sore throat.  She is still fatigued at this time which is most likely due to her strep throat.  No signs of anaphylactic reaction.  Vitals all within normal limits.  Patient is well-appearing and nontoxic.  ED/return precautions given.         Differential diagnosis, natural history, supportive care, and indications for immediate follow-up discussed.    Advised the patient to follow-up with the primary care physician for recheck, reevaluation, and consideration of further management.    Please note that this dictation was created using voice recognition software. I have made a reasonable attempt to correct obvious errors, but I expect that there are errors of grammar and possibly content that I did not discover before finalizing the note.    Electronically signed by Vinod Castaneda PA-C.

## 2023-04-20 ENCOUNTER — OFFICE VISIT (OUTPATIENT)
Dept: URGENT CARE | Facility: CLINIC | Age: 48
End: 2023-04-20
Payer: OTHER MISCELLANEOUS

## 2023-04-20 ENCOUNTER — HOSPITAL ENCOUNTER (OUTPATIENT)
Dept: LAB | Facility: MEDICAL CENTER | Age: 48
End: 2023-04-20
Attending: NURSE PRACTITIONER
Payer: OTHER MISCELLANEOUS

## 2023-04-20 VITALS
OXYGEN SATURATION: 95 % | WEIGHT: 165 LBS | RESPIRATION RATE: 14 BRPM | HEART RATE: 74 BPM | DIASTOLIC BLOOD PRESSURE: 60 MMHG | SYSTOLIC BLOOD PRESSURE: 102 MMHG | BODY MASS INDEX: 26.52 KG/M2 | HEIGHT: 66 IN | TEMPERATURE: 98.8 F

## 2023-04-20 DIAGNOSIS — R53.83 OTHER FATIGUE: ICD-10-CM

## 2023-04-20 LAB
ALBUMIN SERPL BCP-MCNC: 3.9 G/DL (ref 3.2–4.9)
ALBUMIN/GLOB SERPL: 1.1 G/DL
ALP SERPL-CCNC: 61 U/L (ref 30–99)
ALT SERPL-CCNC: 13 U/L (ref 2–50)
ANION GAP SERPL CALC-SCNC: 16 MMOL/L (ref 7–16)
ANISOCYTOSIS BLD QL SMEAR: ABNORMAL
AST SERPL-CCNC: 14 U/L (ref 12–45)
BASOPHILS # BLD AUTO: 0 % (ref 0–1.8)
BASOPHILS # BLD: 0 K/UL (ref 0–0.12)
BILIRUB SERPL-MCNC: 0.4 MG/DL (ref 0.1–1.5)
BUN SERPL-MCNC: 13 MG/DL (ref 8–22)
CALCIUM ALBUM COR SERPL-MCNC: 9.5 MG/DL (ref 8.5–10.5)
CALCIUM SERPL-MCNC: 9.4 MG/DL (ref 8.5–10.5)
CHLORIDE SERPL-SCNC: 107 MMOL/L (ref 96–112)
CO2 SERPL-SCNC: 18 MMOL/L (ref 20–33)
CREAT SERPL-MCNC: 0.75 MG/DL (ref 0.5–1.4)
EOSINOPHIL # BLD AUTO: 0.09 K/UL (ref 0–0.51)
EOSINOPHIL NFR BLD: 0.8 % (ref 0–6.9)
ERYTHROCYTE [DISTWIDTH] IN BLOOD BY AUTOMATED COUNT: 43.7 FL (ref 35.9–50)
GFR SERPLBLD CREATININE-BSD FMLA CKD-EPI: 99 ML/MIN/1.73 M 2
GLOBULIN SER CALC-MCNC: 3.6 G/DL (ref 1.9–3.5)
GLUCOSE SERPL-MCNC: 169 MG/DL (ref 65–99)
HCT VFR BLD AUTO: 43.3 % (ref 37–47)
HGB BLD-MCNC: 15 G/DL (ref 12–16)
LYMPHOCYTES # BLD AUTO: 2.75 K/UL (ref 1–4.8)
LYMPHOCYTES NFR BLD: 23.3 % (ref 22–41)
MACROCYTES BLD QL SMEAR: ABNORMAL
MANUAL DIFF BLD: NORMAL
MCH RBC QN AUTO: 32.4 PG (ref 27–33)
MCHC RBC AUTO-ENTMCNC: 34.6 G/DL (ref 33.6–35)
MCV RBC AUTO: 93.5 FL (ref 81.4–97.8)
MICROCYTES BLD QL SMEAR: ABNORMAL
MONOCYTES # BLD AUTO: 0.61 K/UL (ref 0–0.85)
MONOCYTES NFR BLD AUTO: 5.2 % (ref 0–13.4)
MORPHOLOGY BLD-IMP: NORMAL
NEUTROPHILS # BLD AUTO: 8.34 K/UL (ref 2–7.15)
NEUTROPHILS NFR BLD: 70.7 % (ref 44–72)
NRBC # BLD AUTO: 0 K/UL
NRBC BLD-RTO: 0 /100 WBC
PLATELET # BLD AUTO: 263 K/UL (ref 164–446)
PLATELET BLD QL SMEAR: NORMAL
PMV BLD AUTO: 9.2 FL (ref 9–12.9)
POTASSIUM SERPL-SCNC: 4 MMOL/L (ref 3.6–5.5)
PROT SERPL-MCNC: 7.5 G/DL (ref 6–8.2)
RBC # BLD AUTO: 4.63 M/UL (ref 4.2–5.4)
RBC BLD AUTO: PRESENT
SODIUM SERPL-SCNC: 141 MMOL/L (ref 135–145)
T3 SERPL-MCNC: 138 NG/DL (ref 60–181)
T4 FREE SERPL-MCNC: 2.12 NG/DL (ref 0.93–1.7)
TSH SERPL DL<=0.005 MIU/L-ACNC: 0.1 UIU/ML (ref 0.38–5.33)
WBC # BLD AUTO: 11.8 K/UL (ref 4.8–10.8)

## 2023-04-20 PROCEDURE — 85007 BL SMEAR W/DIFF WBC COUNT: CPT

## 2023-04-20 PROCEDURE — 85025 COMPLETE CBC W/AUTO DIFF WBC: CPT

## 2023-04-20 PROCEDURE — 36415 COLL VENOUS BLD VENIPUNCTURE: CPT

## 2023-04-20 PROCEDURE — 99213 OFFICE O/P EST LOW 20 MIN: CPT | Performed by: NURSE PRACTITIONER

## 2023-04-20 PROCEDURE — 80053 COMPREHEN METABOLIC PANEL: CPT

## 2023-04-20 PROCEDURE — 84480 ASSAY TRIIODOTHYRONINE (T3): CPT

## 2023-04-20 PROCEDURE — 84443 ASSAY THYROID STIM HORMONE: CPT

## 2023-04-20 PROCEDURE — 84439 ASSAY OF FREE THYROXINE: CPT

## 2023-04-20 ASSESSMENT — ENCOUNTER SYMPTOMS
MUSCULOSKELETAL NEGATIVE: 1
FEVER: 0
CARDIOVASCULAR NEGATIVE: 1
GASTROINTESTINAL NEGATIVE: 1
EYES NEGATIVE: 1
CHILLS: 0
RESPIRATORY NEGATIVE: 1

## 2023-04-20 ASSESSMENT — FIBROSIS 4 INDEX: FIB4 SCORE: 0.87

## 2023-04-20 NOTE — PROGRESS NOTES
"Subjective:   Aleena Diana is a 48 y.o. female who presents for Rash (Was seen 4/10, 4/17 for strep, sore throat is cleared, rash located on upper chest, exhausted, fatigue )      Patient presents for evaluation of fatigue and a rash today.  She was initially treated for strep throat on 4/10/2023 with clindamycin.  She developed a rash to her chest after 6 days of treatment and presented back to urgent care. She was rotated to a z-pack at that time.  She reports the rash did go away but then on the second day of her z-yara it flared up again.  This was two days ago.   She discontinued the z-yara, and the rash has begun to resolve.  She reports extreme fatigue and a metallic taste in her mouth.  She reports that she is sleeping through the night and taking naps during the day. Her appetite is low and she has lost ten pounds this week.  She reports improvement of her sore throat, and states the rash is not very symptomatic now and does seem to be resolving.  She denies any fever or chills. She denies any chest pain, shortness or breath, palpitations or throat tightness.  No swelling of her lips or throat.       Review of Systems   Constitutional:  Positive for malaise/fatigue. Negative for chills and fever.   HENT: Negative.     Eyes: Negative.    Respiratory: Negative.     Cardiovascular: Negative.    Gastrointestinal: Negative.    Genitourinary: Negative.    Musculoskeletal: Negative.    Skin:  Positive for rash.     Medications, Allergies, and current problem list reviewed today in Epic.     Objective:     /60 (BP Location: Left arm, Patient Position: Sitting, BP Cuff Size: Adult)   Pulse 74   Temp 37.1 °C (98.8 °F) (Temporal)   Resp 14   Ht 1.676 m (5' 6\")   Wt 74.8 kg (165 lb)   SpO2 95%     Physical Exam  Vitals reviewed.   Constitutional:       Appearance: Normal appearance. She is normal weight.   HENT:      Head: Normocephalic and atraumatic.      Nose: Nose normal.      Mouth/Throat:     "  Mouth: Mucous membranes are moist.      Pharynx: Oropharynx is clear.   Eyes:      Extraocular Movements: Extraocular movements intact.      Conjunctiva/sclera: Conjunctivae normal.      Pupils: Pupils are equal, round, and reactive to light.   Cardiovascular:      Rate and Rhythm: Normal rate and regular rhythm.   Pulmonary:      Effort: Pulmonary effort is normal.      Breath sounds: Normal breath sounds.   Abdominal:      General: Abdomen is flat.      Palpations: Abdomen is soft.   Musculoskeletal:         General: Normal range of motion.      Cervical back: Normal range of motion and neck supple.   Skin:     General: Skin is warm and dry.      Capillary Refill: Capillary refill takes less than 2 seconds.      Findings: Rash present. Rash is urticarial.      Comments: Mild urticarial rash to her bilateral chest area. No evidence of secondary infection.    Neurological:      General: No focal deficit present.      Mental Status: She is alert.   Psychiatric:         Mood and Affect: Mood normal.         Behavior: Behavior normal.       Assessment/Plan:     Diagnosis and associated orders:     1. Other fatigue  CBC WITH DIFFERENTIAL    Comp Metabolic Panel    TSH    FREE THYROXINE    TRIIDOTHYRONINE         Comments/MDM:     Discussed with patient that the rash is likely due to the z-yara, and as it is resolving would not recommend any further systemic treatment. She may apply OTC hydrocortisone cream to the area up to TID as needed.  Suspect her extreme fatigue is due to her recent strep infection, but she was given lab orders today for further workup.  Her last TSH was 10 in May of 2022, and this could also be a source and was added to her labs.  I advised patient that she should make appointment with her PCP for further workup of her fatigue outside of these labs today.  Patient reports that she can get into see her PCP tomorrow, and she will do this.  Her PCP will be able to see these lab results and provide  further guidance and workup as indicated.  Patient is in agreement with this plan.         Differential diagnosis, natural history, supportive care, and indications for immediate follow-up discussed.    Advised the patient to follow-up with the primary care physician for recheck, reevaluation, and consideration of further management.    Please note that this dictation was created using voice recognition software. I have made a reasonable attempt to correct obvious errors, but I expect that there are errors of grammar and possibly content that I did not discover before finalizing the note.    This note was electronically signed by EMMANUEL Velázquez

## 2023-04-20 NOTE — LETTER
April 20, 2023       Patient: Aleena Diana   YOB: 1975   Date of Visit: 4/20/2023         To Whom It May Concern:    In my medical opinion, I recommend that Aleena Diana be excused from work 4/20/2023 and 4/21/2023 due to illness.    If you have any questions or concerns, please don't hesitate to call [phone]          Sincerely,          [email or web address].  Electronically Signed

## 2023-04-21 ENCOUNTER — OFFICE VISIT (OUTPATIENT)
Dept: MEDICAL GROUP | Facility: MEDICAL CENTER | Age: 48
End: 2023-04-21
Payer: OTHER MISCELLANEOUS

## 2023-04-21 VITALS
RESPIRATION RATE: 16 BRPM | HEART RATE: 74 BPM | DIASTOLIC BLOOD PRESSURE: 74 MMHG | HEIGHT: 63 IN | TEMPERATURE: 98.4 F | BODY MASS INDEX: 29.41 KG/M2 | WEIGHT: 166.01 LBS | SYSTOLIC BLOOD PRESSURE: 100 MMHG | OXYGEN SATURATION: 98 %

## 2023-04-21 DIAGNOSIS — Z01.818 TUBAL LIGATION EVALUATION: ICD-10-CM

## 2023-04-21 DIAGNOSIS — E06.3 HYPOTHYROIDISM DUE TO HASHIMOTO'S THYROIDITIS: ICD-10-CM

## 2023-04-21 DIAGNOSIS — E55.9 VITAMIN D DEFICIENCY: ICD-10-CM

## 2023-04-21 DIAGNOSIS — E03.8 HYPOTHYROIDISM DUE TO HASHIMOTO'S THYROIDITIS: ICD-10-CM

## 2023-04-21 DIAGNOSIS — R63.4 WEIGHT LOSS: ICD-10-CM

## 2023-04-21 DIAGNOSIS — D72.823 LEUKEMOID REACTION: ICD-10-CM

## 2023-04-21 DIAGNOSIS — E10.9 TYPE 1 DIABETES MELLITUS WITHOUT COMPLICATION (HCC): ICD-10-CM

## 2023-04-21 PROCEDURE — 99214 OFFICE O/P EST MOD 30 MIN: CPT | Performed by: NURSE PRACTITIONER

## 2023-04-21 RX ORDER — LEVOTHYROXINE SODIUM 175 UG/1
175 TABLET ORAL
Qty: 90 TABLET | Refills: 0 | Status: SHIPPED | OUTPATIENT
Start: 2023-04-21 | End: 2023-05-10

## 2023-04-21 ASSESSMENT — FIBROSIS 4 INDEX: FIB4 SCORE: 0.68

## 2023-04-21 NOTE — ASSESSMENT & PLAN NOTE
Pt is followed by by endocrinology, Dr. Zhu.  She is on NovoLog injections.  Is well controlled.  She denies neuropathy.  Regular redness screens.

## 2023-04-21 NOTE — LETTER
[facility]  KALEN Melgar.  [address]  [place] [address]  Fax: [phone]   Authorization for Release/Disclosure of   Protected Health Information   Name: CHARISSE COWART : 1975 SSN: [id number]   Address: [address] [place] [address] [address] [place] [address] Phone:    [phone] (home)    I authorize the entity listed below to release/disclose the PHI below to:   [facility]/OSKAR Melgar and OSKAR Melgar   Provider or Entity Name:  [facility]   Address  [address], [place] [address] Phone:   [phone]    Fax:   [phone]   Reason for request: continuity of care   Information to be released:    [  ] LAST COLONOSCOPY,  including any PATH REPORT and follow-up  [  ] LAST FIT/COLOGUARD RESULT [  ] LAST DEXA  [  ] LAST MAMMOGRAM  [  ] LAST PAP  [  ] LAST LABS [  ] RETINA EXAM REPORT  [  ] IMMUNIZATION RECORDS  [ XX ] Release all info      [  ] Check here and initial the line next to each item to release ALL health information INCLUDING  _____ Care and treatment for drug and / or alcohol abuse  _____ HIV testing, infection status, or AIDS  _____ Genetic Testing    DATES OF SERVICE OR TIME PERIOD TO BE DISCLOSED: _____________  I understand and acknowledge that:  * This Authorization may be revoked at any time by you in writing, except if your health information has already been used or disclosed.  * Your health information that will be used or disclosed as a result of you signing this authorization could be re-disclosed by the recipient. If this occurs, your re-disclosed health information may no longer be protected by State or Federal laws.  * You may refuse to sign this Authorization. Your refusal will not affect your ability to obtain treatment.  * This Authorization becomes effective upon signing and will  on (date) __________.      If no date is indicated, this Authorization will   one (1) year from the signature date.    Name: Aleena Sue Adalgisa  Signature: continuity of care Date:   4/21/2023     PLEASE FAX REQUESTED RECORDS BACK TO: [phone]

## 2023-04-21 NOTE — ASSESSMENT & PLAN NOTE
Latest Reference Range & Units 04/20/23 12:37   WBC 4.8 - 10.8 K/uL 11.8 (H)   (H): Data is abnormally high  Mild reactive leukocytosis due to recent sickness, was diagnosed with strep throat a week ago, treated and no symptoms today.

## 2023-04-21 NOTE — ASSESSMENT & PLAN NOTE
Latest Reference Range & Units 04/20/23 12:37   TSH 0.380 - 5.330 uIU/mL 0.100 (L)   Free T-4 0.93 - 1.70 ng/dL 2.12 (H)   T3 60.0 - 181.0 ng/dL 138.0   (L): Data is abnormally low  (H): Data is abnormally high  Currently on Synthroid 200 mcg and compliant.  She prefers the brand.  She reports recent 1015 pound weight loss in the last few months.  Otherwise no palpitations, anxiety constipation or changes in skin hair or nail quality.

## 2023-04-21 NOTE — LETTER
April 21, 2023    To Whom It May Concern:         This is confirmation that Aleena Diana attended her scheduled appointment with OSKAR Melgar on 4/21/23.         If you have any questions please do not hesitate to call me at the phone number listed below.    Sincerely,          Magdalena Huggins, Med Ass't  [phone]

## 2023-04-21 NOTE — PROGRESS NOTES
"Chief Complaint   Patient presents with    Lab Results     Labs done 4/20       HISTORY OF PRESENT ILLNESS: Patient is a 48 y.o. female, established patient who presents today to discuss medical problems as listed below:    Health Maintenance:  COMPLETED       Allergies: Bee venom, Codeine, Other drug, Penicillins, Tree nuts food allergy, Azithromycin, Bee, Clindamycin, Influenza vaccines, Ivp [iodine], Other food, and Tape    Current Outpatient Medications   Medication Sig Dispense Refill    VITAMIN D PO Take  by mouth.      levothyroxine (SYNTHROID) 175 MCG Tab Take 1 Tablet by mouth every morning on an empty stomach. 90 Tablet 0    EPINEPHrine (EPIPEN) 0.3 MG/0.3ML Solution Auto-injector solution for injection INJECT 0.3 ML INTO THE SHOULDER, THIGH, OR BUTTOCKS ONE TIME FOR 1 DOSE.      drospirenone-ethinyl estradiol (AUTUMN) 3-0.02 MG per tablet Take 1 Tablet by mouth every day. 84 Tablet 3    insulin aspart (NOVOLOG FLEXPEN) 100 UNIT/ML injection PEN Inject 30-36 Units under the skin 3 times a day before meals. 30 mL 2     No current facility-administered medications for this visit.       Allergies, past medical history, past surgical history, family history, social history reviewed and updated.    Review of Systems:     - Constitutional: unexpected weight change. Negative for fever, chills,  and fatigue/generalized weakness.      - Respiratory: Negative for cough, sputum production, chest congestion, dyspnea, wheezing, and crackles.      - Cardiovascular: Negative for chest pain, palpitations, orthopnea, and bilateral lower extremity edema.     - Psychiatric/Behavioral: Negative for depression, suicidal/homicidal ideation and memory loss.      All other systems reviewed and are negative    Exam:    /74 (BP Location: Left arm, Patient Position: Sitting, BP Cuff Size: Adult)   Pulse 74   Temp 36.9 °C (98.4 °F) (Temporal)   Resp 16   Ht 1.6 m (5' 3\")   Wt 75.3 kg (166 lb 0.1 oz)   LMP 04/21/2023 (Exact " Date)   SpO2 98%   Breastfeeding No   BMI 29.41 kg/m²  Body mass index is 29.41 kg/m².    Physical Exam:  Constitutional: Well-developed and well-nourished. Not diaphoretic. No distress.   Cardiovascular: Regular rate and rhythm, S1 and S2 without murmur, rubs, or gallops.    Chest: Effort normal. Clear to auscultation throughout. No adventitious sounds. Neurological: Alert and oriented x 3.   Psychiatric:  Behavior, mood, and affect are appropriate.  MA/nursing note and vitals reviewed.    LABS: 4/20/23  results reviewed and discussed with the patient, questions answered.    Assessment/Plan:  Leukemoid reaction   Latest Reference Range & Units 04/20/23 12:37   WBC 4.8 - 10.8 K/uL 11.8 (H)   (H): Data is abnormally high  Mild reactive leukocytosis due to recent sickness, was diagnosed with strep throat a week ago, treated and no symptoms today.    Hypothyroidism due to Hashimoto's thyroiditis   Latest Reference Range & Units 04/20/23 12:37   TSH 0.380 - 5.330 uIU/mL 0.100 (L)   Free T-4 0.93 - 1.70 ng/dL 2.12 (H)   T3 60.0 - 181.0 ng/dL 138.0   (L): Data is abnormally low  (H): Data is abnormally high  Currently on Synthroid 200 mcg and compliant.  She prefers the brand.  She reports recent 1015 pound weight loss in the last few months.  Otherwise no palpitations, anxiety constipation or changes in skin hair or nail quality.    Type 1 diabetes mellitus without complication (HCC)  Pt is followed by by endocrinology, Dr. Zhu.  She is on NovoLog injections.  Is well controlled.  She denies neuropathy.  Regular redness screens.    1. Leukemoid reaction  Likely secondary to recent URI.  No concerns at this time.  We will recheck in labs    2. Hypothyroidism due to Hashimoto's thyroiditis  Uncontrolled, stable.  Recent weight loss likely related to decreased TSH.  Will decrease Synthroid dose to 175 mcg from 200 mcg.  We will follow-up in labs in 8 weeks.  - TSH; Future  - T3 FREE; Future  - FREE THYROXINE;  Future  - levothyroxine (SYNTHROID) 175 MCG Tab; Take 1 Tablet by mouth every morning on an empty stomach.  Dispense: 90 Tablet; Refill: 0    3. Vitamin D deficiency  Last year vitamin D level at 19.  Has been taking supplements since.  We will recheck in labs.  - VITAMIN D,25 HYDROXY (DEFICIENCY); Future    4. Tubal ligation evaluation  Patient requesting consultation for tubal ligation, needing a referral.  - Referral to OB/Gyn    5. Type 1 diabetes mellitus without complication (HCC)  Stable.  Patient is followed by endocrinology.  We will request records.      Discussed with patient possible alternative diagnoses, patient is to take all medications as prescribed.      If symptoms persist FU w/PCP, if symptoms worsen go to emergency room.      If experiencing any side effects from prescribed medications report to the office immediately or go to emergency room.     Reviewed indication, dosage, usage and potential adverse effects of prescribed medications.      Reviewed risks and benefits of treatment plan. Patient verbalizes understanding of all instruction and verbally agrees to plan.     Discussed plan with the patient, and patient agrees to the above.      I personally reviewed prior external notes and test results pertinent to today's visit.      No follow-ups on file. 2 mos

## 2023-05-01 ENCOUNTER — GYNECOLOGY VISIT (OUTPATIENT)
Dept: OBGYN | Facility: CLINIC | Age: 48
End: 2023-05-01
Payer: OTHER MISCELLANEOUS

## 2023-05-01 VITALS — DIASTOLIC BLOOD PRESSURE: 77 MMHG | BODY MASS INDEX: 29.76 KG/M2 | WEIGHT: 168 LBS | SYSTOLIC BLOOD PRESSURE: 128 MMHG

## 2023-05-01 DIAGNOSIS — Z30.09 FAMILY PLANNING: ICD-10-CM

## 2023-05-01 PROCEDURE — 99202 OFFICE O/P NEW SF 15 MIN: CPT | Performed by: OBSTETRICS & GYNECOLOGY

## 2023-05-01 ASSESSMENT — FIBROSIS 4 INDEX: FIB4 SCORE: 0.68

## 2023-05-01 NOTE — PROGRESS NOTES
Chief complaint; new patient    Aleena Diana is a 48 y.o.  who presents to discuss her family-planning options   Patient states that she has abnormal discharge when she is using birth control or IUDs.  Patient desires permanent sterilization.  She does state that her vaginal discharge goes away when not using any birth control.    Patient states she has used all different forms of birth control including oral contraceptives and Mirena IUD she is currently in a new relationship and does not want to conceive.  2 healthy living children    Past OB history- followed by  section  Past medical history-diabetes mellitus, hypothyroidism  Past surgical history-laparoscopic cholecystectomy, open appendectomy,  section  Allergies-    Patient states she is very sensitive to pain medication and anesthesia often takes see patient coming in she just this for a  check she comes in she is try negative prescription she is already gotten so she is they only gave her 3-day supply from been released from the hospital    Review of systems; denies fever chills abdominal pain, denies chest pain shortness of breath or urinary symptoms  Past medical history-  Past Medical History:   Diagnosis Date    Anxiety     Diabetes (HCC)     Palpitations     Thyroid disease      Past surgical history-  Past Surgical History:   Procedure Laterality Date    APPENDECTOMY      CHOLECYSTECTOMY      PRIMARY C SECTION       Allergies-Bee venom, Codeine, Other drug, Penicillins, Tree nuts food allergy, Azithromycin, Bee, Clindamycin, Influenza vaccines, Ivp [iodine], Other food, and Tape  Medications-  Current Outpatient Medications on File Prior to Visit   Medication Sig Dispense Refill    VITAMIN D PO Take  by mouth.      levothyroxine (SYNTHROID) 175 MCG Tab Take 1 Tablet by mouth every morning on an empty stomach. 90 Tablet 0    EPINEPHrine (EPIPEN) 0.3 MG/0.3ML Solution Auto-injector solution for injection  INJECT 0.3 ML INTO THE SHOULDER, THIGH, OR BUTTOCKS ONE TIME FOR 1 DOSE.      drospirenone-ethinyl estradiol (AUTUMN) 3-0.02 MG per tablet Take 1 Tablet by mouth every day. 84 Tablet 3    insulin aspart (NOVOLOG FLEXPEN) 100 UNIT/ML injection PEN Inject 30-36 Units under the skin 3 times a day before meals. 30 mL 2     No current facility-administered medications on file prior to visit.     Social history-  Social History     Socioeconomic History    Marital status:      Spouse name: Not on file    Number of children: Not on file    Years of education: Not on file    Highest education level: Master's degree (e.g., MA, MS, Ghassan, MEd, MSW, TOM)   Occupational History    Occupation: RN management   Tobacco Use    Smoking status: Former     Packs/day: 1.00     Years: 20.00     Pack years: 20.00     Types: Cigarettes     Quit date: 2013     Years since quittin.9    Smokeless tobacco: Never   Vaping Use    Vaping Use: Never used   Substance and Sexual Activity    Alcohol use: Yes     Alcohol/week: 0.0 oz     Comment: occasional    Drug use: Yes     Types: Marijuana, Oral    Sexual activity: Yes     Partners: Male   Other Topics Concern    Not on file   Social History Narrative    Not on file     Social Determinants of Health     Financial Resource Strain: Not on file   Food Insecurity: Not on file   Transportation Needs: Not on file   Physical Activity: Not on file   Stress: Not on file   Social Connections: Not on file   Intimate Partner Violence: Not on file   Housing Stability: Not on file     Past Family History-no history of breast or ovarian cancer    Physical examination;  Alert and oriented x3  General a thin well-developed well-nourished female in no apparent distress  Vitals:    23 0926   BP: 128/77   BP Location: Right arm   Patient Position: Sitting   BP Cuff Size: Small adult   Weight: 168 lb         Impression;  Family planning    Plan;  Family planning consultation:  Discussed all forms of  birth control to include; barrier method-condoms or diaphragm, combination contraception including oral contraceptives, contraceptive patch contraceptive ring.  Long-acting progesterone-Depo-Provera, intrauterine device-both hormone-containing (Mirena, Marion, Kyleena, Liletta) and nonhormonal-ParaGard.  Specific risks regarding intrauterine device to include; infection, perforation of the uterus (with the need to perform laparoscopy in the operating room to retrieve IUD within the abdomen)  Possible irregular menstrual cycles to include amenorrhea, intermenstrual bleeding or heavier menstrual cycles.  Chronic pelvic pain from indwelling IUD necessitating removal and possible replacement  Possible pregnancy-removal of IUD if patient becomes pregnant may result in miscarriage  IUDs do not protect against STIs or ectopic pregnancy  All questions answered in detail    She has been counseled that her discharge medical way after tubal ligation.  As there are many different etiologies for abnormal brown vaginal discharge.  The current time the patient states she is not currently has and she does not have this discharge.  We discussed that it is typical for women in her late 30s and early 40s to have irregular menstrual cycles and sometimes intermenstrual bleeding.      Referral placed for laparoscopic bilateral salpingectomy              20  Minutes spent with the patient in face-to-face contact, 100% of the time spent on counseling and coordination of care. All questions answered in detail.

## 2023-05-05 DIAGNOSIS — E03.8 HYPOTHYROIDISM DUE TO HASHIMOTO'S THYROIDITIS: ICD-10-CM

## 2023-05-05 DIAGNOSIS — E06.3 HYPOTHYROIDISM DUE TO HASHIMOTO'S THYROIDITIS: ICD-10-CM

## 2023-05-10 RX ORDER — LEVOTHYROXINE SODIUM 175 MCG
TABLET ORAL
Qty: 90 TABLET | Refills: 1 | Status: SHIPPED | OUTPATIENT
Start: 2023-05-10 | End: 2023-09-05

## 2023-05-12 ENCOUNTER — OFFICE VISIT (OUTPATIENT)
Dept: MEDICAL GROUP | Facility: MEDICAL CENTER | Age: 48
End: 2023-05-12
Payer: OTHER MISCELLANEOUS

## 2023-05-12 VITALS
HEIGHT: 63 IN | RESPIRATION RATE: 16 BRPM | WEIGHT: 167.77 LBS | SYSTOLIC BLOOD PRESSURE: 112 MMHG | TEMPERATURE: 97.8 F | HEART RATE: 96 BPM | BODY MASS INDEX: 29.73 KG/M2 | DIASTOLIC BLOOD PRESSURE: 64 MMHG | OXYGEN SATURATION: 97 %

## 2023-05-12 DIAGNOSIS — Z11.1 TUBERCULOSIS SCREENING: ICD-10-CM

## 2023-05-12 DIAGNOSIS — T80.69XA ALLERGIC REACTION TO VACCINE: ICD-10-CM

## 2023-05-12 PROCEDURE — 3078F DIAST BP <80 MM HG: CPT | Performed by: NURSE PRACTITIONER

## 2023-05-12 PROCEDURE — 99212 OFFICE O/P EST SF 10 MIN: CPT | Performed by: NURSE PRACTITIONER

## 2023-05-12 PROCEDURE — 1126F AMNT PAIN NOTED NONE PRSNT: CPT | Performed by: NURSE PRACTITIONER

## 2023-05-12 PROCEDURE — 3074F SYST BP LT 130 MM HG: CPT | Performed by: NURSE PRACTITIONER

## 2023-05-12 ASSESSMENT — FIBROSIS 4 INDEX: FIB4 SCORE: 0.68

## 2023-05-12 NOTE — ASSESSMENT & PLAN NOTE
Patient is here requiring QuantiFERON screening.  She has history of allergic reaction to PPD injections.  This is required for work.  She currently has no symptoms, no chest discomfort, cough or URI symptoms.

## 2023-05-12 NOTE — PROGRESS NOTES
"Chief Complaint   Patient presents with    Requesting Labs       HISTORY OF PRESENT ILLNESS: Patient is a 48 y.o. female, established patient who presents today to discuss medical problems as listed below:      Allergies: Bee venom, Codeine, Other drug, Penicillins, Tree nuts food allergy, Azithromycin, Bee, Clindamycin, Influenza vaccines, Ivp [iodine], Other food, and Tape    Current Outpatient Medications   Medication Sig Dispense Refill    SYNTHROID 175 MCG Tab TAKE 1 TABLET BY MOUTH EVERY DAY IN THE MORNING ON AN EMPTY STOMACH 90 Tablet 1    VITAMIN D PO Take  by mouth.      EPINEPHrine (EPIPEN) 0.3 MG/0.3ML Solution Auto-injector solution for injection INJECT 0.3 ML INTO THE SHOULDER, THIGH, OR BUTTOCKS ONE TIME FOR 1 DOSE.      drospirenone-ethinyl estradiol (AUTUMN) 3-0.02 MG per tablet Take 1 Tablet by mouth every day. 84 Tablet 3    insulin aspart (NOVOLOG FLEXPEN) 100 UNIT/ML injection PEN Inject 30-36 Units under the skin 3 times a day before meals. 30 mL 2     No current facility-administered medications for this visit.       Allergies, past medical history, past surgical history, family history, social history reviewed and updated.    Review of Systems:     - Constitutional: Negative for fever, chills, unexpected weight change, and fatigue/generalized weakness.      - Respiratory: Negative for cough, sputum production, chest congestion, dyspnea, wheezing, and crackles.      - Cardiovascular: Negative for chest pain, palpitations, orthopnea, and bilateral lower extremity edema.      - Psychiatric/Behavioral: Negative for depression, suicidal/homicidal ideation and memory loss.      All other systems reviewed and are negative    Exam:    /64 (BP Location: Left arm, Patient Position: Sitting, BP Cuff Size: Adult)   Pulse 96   Temp 36.6 °C (97.8 °F) (Temporal)   Resp 16   Ht 1.6 m (5' 3\")   Wt 76.1 kg (167 lb 12.3 oz)   LMP 04/26/2023 (Exact Date)   SpO2 97%   BMI 29.72 kg/m²  Body mass index is " 29.72 kg/m².    Physical Exam:  Constitutional: Well-developed and well-nourished. Not diaphoretic. No distress.   Cardiovascular: Regular rate and rhythm, S1 and S2 without murmur, rubs, or gallops.    Chest: Effort normal. Clear to auscultation throughout. No adventitious sounds. Neurological: Alert and oriented x 3.   Psychiatric:  Behavior, mood, and affect are appropriate.  MA/nursing note and vitals reviewed.     Assessment/Plan:  Tuberculosis screening  Patient is here requiring QuantiFERON screening.  She has history of allergic reaction to PPD injections.  This is required for work.  She currently has no symptoms, no chest discomfort, cough or URI symptoms.    1. Tuberculosis screening  - Quantiferon Gold TB (PPD); Future    2. Allergic reaction to vaccine  History of allergies to PPD injection.      Discussed with patient possible alternative diagnoses, patient is to take all medications as prescribed.      If symptoms persist FU w/PCP, if symptoms worsen go to emergency room.      If experiencing any side effects from prescribed medications report to the office immediately or go to emergency room.     Reviewed indication, dosage, usage and potential adverse effects of prescribed medications.      Reviewed risks and benefits of treatment plan. Patient verbalizes understanding of all instruction and verbally agrees to plan.     Discussed plan with the patient, and patient agrees to the above.      I personally reviewed prior external notes and test results pertinent to today's visit.      No follow-ups on file.

## 2023-09-05 ENCOUNTER — OFFICE VISIT (OUTPATIENT)
Dept: MEDICAL GROUP | Facility: MEDICAL CENTER | Age: 48
End: 2023-09-05
Payer: COMMERCIAL

## 2023-09-05 VITALS
DIASTOLIC BLOOD PRESSURE: 60 MMHG | BODY MASS INDEX: 26.03 KG/M2 | RESPIRATION RATE: 17 BRPM | TEMPERATURE: 98.5 F | OXYGEN SATURATION: 97 % | WEIGHT: 162 LBS | HEIGHT: 66 IN | HEART RATE: 67 BPM | SYSTOLIC BLOOD PRESSURE: 104 MMHG

## 2023-09-05 DIAGNOSIS — R10.84 GENERALIZED ABDOMINAL PAIN: ICD-10-CM

## 2023-09-05 DIAGNOSIS — E10.9 TYPE 1 DIABETES MELLITUS WITHOUT COMPLICATION (HCC): ICD-10-CM

## 2023-09-05 DIAGNOSIS — E06.3 HYPOTHYROIDISM DUE TO HASHIMOTO'S THYROIDITIS: ICD-10-CM

## 2023-09-05 DIAGNOSIS — E03.8 HYPOTHYROIDISM DUE TO HASHIMOTO'S THYROIDITIS: ICD-10-CM

## 2023-09-05 DIAGNOSIS — E11.65 UNCONTROLLED TYPE 2 DIABETES MELLITUS WITH HYPERGLYCEMIA (HCC): ICD-10-CM

## 2023-09-05 DIAGNOSIS — K90.49 FOOD INTOLERANCE: ICD-10-CM

## 2023-09-05 PROCEDURE — 3074F SYST BP LT 130 MM HG: CPT | Performed by: NURSE PRACTITIONER

## 2023-09-05 PROCEDURE — 3078F DIAST BP <80 MM HG: CPT | Performed by: NURSE PRACTITIONER

## 2023-09-05 PROCEDURE — 99214 OFFICE O/P EST MOD 30 MIN: CPT | Performed by: NURSE PRACTITIONER

## 2023-09-05 RX ORDER — LEVOTHYROXINE SODIUM 175 UG/1
175 TABLET ORAL
Qty: 90 TABLET | Refills: 0 | Status: SHIPPED | OUTPATIENT
Start: 2023-09-05 | End: 2023-09-21

## 2023-09-05 RX ORDER — INSULIN ASPART 100 [IU]/ML
30-36 INJECTION, SOLUTION INTRAVENOUS; SUBCUTANEOUS
Qty: 30 ML | Refills: 2 | Status: SHIPPED | OUTPATIENT
Start: 2023-09-05 | End: 2023-09-07

## 2023-09-05 ASSESSMENT — FIBROSIS 4 INDEX: FIB4 SCORE: 0.68

## 2023-09-05 NOTE — ASSESSMENT & PLAN NOTE
New problem. Last mo went on vacation and was eating gluten, experienced abdominal pain, bloating. No nausea, no diarrhia, no blood blood in stool, no fevers. Last episode was last wk, now feeling well.   She also mentions intolerance to tomatoes which she was eating as well.

## 2023-09-06 NOTE — PROGRESS NOTES
"Chief Complaint   Patient presents with    Abdominal Pain    Medication Refill       HISTORY OF PRESENT ILLNESS: Patient is a 48 y.o. female, established patient who presents today to discuss medical problems as listed below:    Health Maintenance:  COMPLETED       Allergies: Bee venom, Codeine, Other drug, Penicillins, Tree nuts food allergy, Azithromycin, Bee, Clindamycin, Influenza vaccines, Ivp [iodine], Other food, and Tape    Current Outpatient Medications   Medication Sig Dispense Refill    levothyroxine (SYNTHROID) 175 MCG Tab Take 1 Tablet by mouth every morning on an empty stomach. 90 Tablet 0    insulin aspart (NOVOLOG FLEXPEN) 100 UNIT/ML injection PEN Inject 30-36 Units under the skin 3 times a day before meals. 30 mL 2    VITAMIN D PO Take  by mouth.      EPINEPHrine (EPIPEN) 0.3 MG/0.3ML Solution Auto-injector solution for injection INJECT 0.3 ML INTO THE SHOULDER, THIGH, OR BUTTOCKS ONE TIME FOR 1 DOSE.       No current facility-administered medications for this visit.       Allergies, past medical history, past surgical history, family history, social history reviewed and updated.    Review of Systems:     - Constitutional: Negative for fever, chills, unexpected weight change, and fatigue/generalized weakness.       - Respiratory: Negative for cough, sputum production, chest congestion, dyspnea, wheezing, and crackles.      - Cardiovascular: Negative for chest pain, palpitations, orthopnea, and bilateral lower extremity edema.      - Psychiatric/Behavioral: Negative for depression, suicidal/homicidal ideation and memory loss.      All other systems reviewed and are negative    Exam:    /60 (BP Location: Left arm, Patient Position: Sitting, BP Cuff Size: Adult)   Pulse 67   Temp 36.9 °C (98.5 °F) (Temporal)   Resp 17   Ht 1.676 m (5' 6\")   Wt 73.5 kg (162 lb)   SpO2 97%   BMI 26.15 kg/m²  Body mass index is 26.15 kg/m².    Physical Exam:  Constitutional: Well-developed and " well-nourished. Not diaphoretic. No distress.   Cardiovascular: Regular rate and rhythm, S1 and S2 without murmur, rubs, or gallops.    Chest: Effort normal. Clear to auscultation throughout. No adventitious sounds. Neurological: Alert and oriented x 3.   Psychiatric:  Behavior, mood, and affect are appropriate.  MA/nursing note and vitals reviewed.    Assessment/Plan:  Generalized abdominal pain  New problem. Last mo went on vacation and was eating gluten, experienced abdominal pain, bloating. No nausea, no diarrhia, no blood blood in stool, no fevers. Last episode was last wk, now feeling well.   She also mentions intolerance to tomatoes which she was eating as well.     Hypothyroidism due to Hashimoto's thyroiditis   Latest Reference Range & Units 04/20/23 12:37   TSH 0.380 - 5.330 uIU/mL 0.100 (L)   Free T-4 0.93 - 1.70 ng/dL 2.12 (H)   T3 60.0 - 181.0 ng/dL 138.0   (L): Data is abnormally low  (H): Data is abnormally high  Denies hypo or hyper thyroid s/s  Following with endo, f/u in December, needing refills now.    Type 1 diabetes mellitus without complication (HCC)  Chronic and stable.  Following with endocrinology.  Unable to get a follow-up with Endo and needing her refills now.  She is doing well, will obtain lab work in the next month.    1. Generalized abdominal pain  - FOOD #1 (61)  - CELIAC DISEASE AB PANEL; Future    2. Food intolerance  - FOOD #1 (61)  - CELIAC DISEASE AB PANEL; Future    3. Hypothyroidism due to Hashimoto's thyroiditis  - TSH; Future  - T3 FREE; Future  - FREE THYROXINE; Future  - levothyroxine (SYNTHROID) 175 MCG Tab; Take 1 Tablet by mouth every morning on an empty stomach.  Dispense: 90 Tablet; Refill: 0    4. Type 1 diabetes mellitus without complication (HCC)  - HEMOGLOBIN A1C; Future  - Comp Metabolic Panel; Future  - Lipid Profile; Future    5. Uncontrolled type 2 diabetes mellitus with hyperglycemia (HCC)  - insulin aspart (NOVOLOG FLEXPEN) 100 UNIT/ML injection PEN; Inject  30-36 Units under the skin 3 times a day before meals.  Dispense: 30 mL; Refill: 2      Discussed with patient possible alternative diagnoses, patient is to take all medications as prescribed.      If symptoms persist FU w/PCP, if symptoms worsen go to emergency room.      If experiencing any side effects from prescribed medications report to the office immediately or go to emergency room.     Reviewed indication, dosage, usage and potential adverse effects of prescribed medications.      Reviewed risks and benefits of treatment plan. Patient verbalizes understanding of all instruction and verbally agrees to plan.     Discussed plan with the patient, and patient agrees to the above.      I personally reviewed prior external notes and test results pertinent to today's visit.      No follow-ups on file. 3 wks

## 2023-09-06 NOTE — ASSESSMENT & PLAN NOTE
Chronic and stable.  Following with endocrinology.  Unable to get a follow-up with Endo and needing her refills now.  She is doing well, will obtain lab work in the next month.

## 2023-09-06 NOTE — ASSESSMENT & PLAN NOTE
Latest Reference Range & Units 04/20/23 12:37   TSH 0.380 - 5.330 uIU/mL 0.100 (L)   Free T-4 0.93 - 1.70 ng/dL 2.12 (H)   T3 60.0 - 181.0 ng/dL 138.0   (L): Data is abnormally low  (H): Data is abnormally high  Denies hypo or hyper thyroid s/s  Following with endo, f/u in December, needing refills now.

## 2023-09-07 DIAGNOSIS — E11.65 UNCONTROLLED TYPE 2 DIABETES MELLITUS WITH HYPERGLYCEMIA (HCC): ICD-10-CM

## 2023-09-07 RX ORDER — INSULIN LISPRO 100 [IU]/ML
10 INJECTION, SOLUTION INTRAVENOUS; SUBCUTANEOUS
Qty: 3 ML | Refills: 3 | Status: SHIPPED | OUTPATIENT
Start: 2023-09-07

## 2023-09-07 NOTE — TELEPHONE ENCOUNTER
Received request via: Pharmacy    Was the patient seen in the last year in this department? Yes    Does the patient have an active prescription (recently filled or refills available) for medication(s) requested? No    Does the patient have [facility] Plus and need 100 day supply (blood pressure, diabetes and cholesterol meds only)? Patient does not have SCP    Pharmacy comment: Alternative Requested:THE PRESCRIBED MEDICATION IS NOT COVERED BY INSURANCE. PLEASE CONSIDER CHANGING TO ONE OF THE SUGGESTED COVERED ALTERNATIVES.

## 2023-09-13 ENCOUNTER — OFFICE VISIT (OUTPATIENT)
Dept: URGENT CARE | Facility: PHYSICIAN GROUP | Age: 48
End: 2023-09-13
Payer: COMMERCIAL

## 2023-09-13 VITALS
OXYGEN SATURATION: 96 % | DIASTOLIC BLOOD PRESSURE: 62 MMHG | BODY MASS INDEX: 26.03 KG/M2 | WEIGHT: 162 LBS | SYSTOLIC BLOOD PRESSURE: 104 MMHG | TEMPERATURE: 98.1 F | HEART RATE: 73 BPM | RESPIRATION RATE: 16 BRPM | HEIGHT: 66 IN

## 2023-09-13 DIAGNOSIS — N92.1 MENORRHAGIA WITH IRREGULAR CYCLE: ICD-10-CM

## 2023-09-13 LAB
APPEARANCE UR: NORMAL
BILIRUB UR STRIP-MCNC: NEGATIVE MG/DL
COLOR UR AUTO: YELLOW
GLUCOSE UR STRIP.AUTO-MCNC: 250 MG/DL
KETONES UR STRIP.AUTO-MCNC: 15 MG/DL
LEUKOCYTE ESTERASE UR QL STRIP.AUTO: NEGATIVE
NITRITE UR QL STRIP.AUTO: NEGATIVE
PH UR STRIP.AUTO: 6 [PH] (ref 5–8)
POCT INT CON NEG: NEGATIVE
POCT INT CON POS: POSITIVE
POCT URINE PREGNANCY TEST: NEGATIVE
PROT UR QL STRIP: NEGATIVE MG/DL
RBC UR QL AUTO: NEGATIVE
SP GR UR STRIP.AUTO: 1.03
UROBILINOGEN UR STRIP-MCNC: 0.2 MG/DL

## 2023-09-13 PROCEDURE — 81002 URINALYSIS NONAUTO W/O SCOPE: CPT

## 2023-09-13 PROCEDURE — 3078F DIAST BP <80 MM HG: CPT

## 2023-09-13 PROCEDURE — 3074F SYST BP LT 130 MM HG: CPT

## 2023-09-13 PROCEDURE — 81025 URINE PREGNANCY TEST: CPT

## 2023-09-13 PROCEDURE — 99213 OFFICE O/P EST LOW 20 MIN: CPT

## 2023-09-13 ASSESSMENT — ENCOUNTER SYMPTOMS
SHORTNESS OF BREATH: 0
DIZZINESS: 1
ABDOMINAL PAIN: 0
FEVER: 0
MYALGIAS: 0

## 2023-09-13 ASSESSMENT — FIBROSIS 4 INDEX: FIB4 SCORE: 0.68

## 2023-09-13 NOTE — PROGRESS NOTES
Subjective:     CHIEF COMPLAINT  Chief Complaint   Patient presents with    Menstrual Problem     Menstrual cycle x3 weeks, dizzy, feeling tired.          HPI  Aleena Diana is a very pleasant 48 y.o. female who presents with prolonged menstruation for 3 weeks.  She reports that she has been menstruating for the past 3 weeks with increasingly heavy menstruation and passage of clots.  She has been changing her super absorbent tampons every 1.5 hours and has begun to feel fatigue and intermittent lightheadedness.  She reports that her periods typically occur every 28 days and last 4 to 5 days.  She states that she takes levothyroxine and missed 5 doses 1 week ago, which she thinks may have contributed to the increasing heaviness of her menstruation.  She denies any other changes over the past 3 weeks.  She does not take birth control and denies any new medications.  She denies pelvic pain or cramping.  She has not experienced any chest pain or shortness of breath.  She last saw her gynecologist in May and her last Pap smear was 2 years ago with normal results.    REVIEW OF SYSTEMS  Review of Systems   Constitutional:  Positive for malaise/fatigue. Negative for fever.   Respiratory:  Negative for shortness of breath.    Cardiovascular:  Negative for chest pain.   Gastrointestinal:  Negative for abdominal pain.   Genitourinary:  Negative for dysuria, frequency and urgency.   Musculoskeletal:  Negative for myalgias.   Neurological:  Positive for dizziness.       PAST MEDICAL HISTORY  Patient Active Problem List    Diagnosis Date Noted    Generalized abdominal pain 09/05/2023    Tuberculosis screening 05/12/2023    Leukemoid reaction 04/21/2023    Routine screening for STI (sexually transmitted infection) 03/15/2023    Dysuria 09/07/2022    Acute pain of left knee 09/07/2022    Hypertensive crisis 05/26/2022    Skin tag 05/27/2021    Excessive sexual drive 04/06/2021    Encounter for surveillance of  contraceptive pills 04/06/2021    Vaginal yeast infection 04/06/2021    Encounter for long-term methadone use 04/24/2020    Encounter for long-term (current) use of insulin (HCC) 01/17/2020    Hyperglycemia 01/17/2020    Hypothyroidism 01/17/2020    Type 1 diabetes mellitus without complication (HCC) 01/17/2020    Skin lesion 11/06/2018    Right hand pain 04/09/2018    Plantar fasciitis 04/09/2018    Fatty liver 04/21/2017    Elevated LFTs 04/20/2017    Tonsillith 04/19/2017    Vitamin D deficiency 01/20/2017    IUD (intrauterine device) in place 01/17/2017    Chronic fatigue 01/17/2017    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    ALLERGIES  Allergies   Allergen Reactions    Bee Venom     Codeine     Other Drug     Penicillins     Tree Nuts Food Allergy     Azithromycin Rash     Rash on  my chest    Bee     Clindamycin Hives    Influenza Vaccines Swelling     Arm became swollen and red    Ivp [Iodine]     Other Food      Nuts    Tape        CURRENT MEDICATIONS  Home Medications       Reviewed by TANVI MontielA.-C. (Physician Assistant) on 09/13/23 at 1552  Med List Status: <None>     Medication Last Dose Status   EPINEPHrine (EPIPEN) 0.3 MG/0.3ML Solution Auto-injector solution for injection PRN Active   insulin lispro (HUMALOG KWIKPEN) 100 UNIT/ML SC SOPN injection PEN Taking Active   levothyroxine (SYNTHROID) 175 MCG Tab Taking Active   VITAMIN D PO Taking Active                    SOCIAL HISTORY  Social History     Tobacco Use    Smoking status: Former     Current packs/day: 0.00     Average packs/day: 1 pack/day for  "20.0 years (20.0 ttl pk-yrs)     Types: Cigarettes     Start date: 5/6/1993     Quit date: 5/6/2013     Years since quitting: 10.3    Smokeless tobacco: Never   Vaping Use    Vaping Use: Never used   Substance and Sexual Activity    Alcohol use: Yes     Alcohol/week: 0.0 oz     Comment: occasional    Drug use: Yes     Types: Marijuana, Oral    Sexual activity: Yes     Partners: Male       FAMILY HISTORY  Family History   Problem Relation Age of Onset    Hypertension Mother     Hyperlipidemia Mother     Hypertension Father     Cancer Maternal Grandfather         lung    Diabetes Neg Hx     Heart Disease Neg Hx     Stroke Neg Hx           Objective:     VITAL SIGNS: /62 (BP Location: Right arm, Patient Position: Sitting, BP Cuff Size: Adult)   Pulse 73   Temp 36.7 °C (98.1 °F) (Temporal)   Resp 16   Ht 1.676 m (5' 6\")   Wt 73.5 kg (162 lb)   SpO2 96%   BMI 26.15 kg/m²     PHYSICAL EXAM  Physical Exam  Vitals reviewed.   Constitutional:       General: She is not in acute distress.     Appearance: Normal appearance. She is not ill-appearing, toxic-appearing or diaphoretic.   HENT:      Head: Normocephalic and atraumatic.      Mouth/Throat:      Mouth: Mucous membranes are moist.   Eyes:      Extraocular Movements: Extraocular movements intact.      Conjunctiva/sclera: Conjunctivae normal.      Pupils: Pupils are equal, round, and reactive to light.   Cardiovascular:      Rate and Rhythm: Normal rate.   Pulmonary:      Effort: Pulmonary effort is normal. No respiratory distress.   Abdominal:      General: Abdomen is flat.   Skin:     General: Skin is warm and dry.      Coloration: Skin is not pale.   Neurological:      General: No focal deficit present.      Mental Status: She is alert and oriented to person, place, and time.   Psychiatric:         Mood and Affect: Mood normal.         Assessment/Plan:     1. Menorrhagia with irregular cycle  - POCT Urinalysis  - POCT Pregnancy  - CBC WITH DIFFERENTIAL; " Future  - Referral to Gynecology  - FSH/LH; Future  -Follow-up with gynecology for further management  -Be seen at ER if symptoms acutely worsen  -Return to clinic as needed    MDM/Comments:  Patient has stable vital signs and is non-toxic appearing. Discussed supportive care with hydration, rest, and use of over-the-counter iron supplement.  CBC and FSH/LH ordered with results pending.  Referral placed to gynecology.  Strict ER precautions discussed with patient if her symptoms worsen.  Pregnancy test obtained in office with negative results.  Point-of-care urinalysis with small amount of ketones and glucose present in urine.  Patient demonstrated understanding of treatment plan at this time and will RTC if symptoms worsen or fail to resolve.     Differential diagnosis, natural history, supportive care, and indications for immediate follow-up discussed. All questions answered. Patient agrees with the plan of care.    Follow-up as needed if symptoms worsen or fail to improve to PCP, Urgent care or Emergency Room.    I have personally reviewed prior external notes and test results pertinent to today's visit.  I have independently reviewed and interpreted all diagnostics ordered during this urgent care acute visit.   Discussed management options (risks,benefits, and alternatives to treatment). Pt expresses understanding and the treatment plan was agreed upon. Questions were encouraged and answered to pt's satisfaction.    Please note that this dictation was created using voice recognition software. I have made a reasonable attempt to correct obvious errors, but I expect that there are errors of grammar and possibly content that I did not discover before finalizing the note.

## 2023-09-14 ENCOUNTER — HOSPITAL ENCOUNTER (OUTPATIENT)
Dept: LAB | Facility: MEDICAL CENTER | Age: 48
End: 2023-09-14
Attending: NURSE PRACTITIONER
Payer: COMMERCIAL

## 2023-09-14 ENCOUNTER — HOSPITAL ENCOUNTER (OUTPATIENT)
Dept: LAB | Facility: MEDICAL CENTER | Age: 48
End: 2023-09-14
Payer: COMMERCIAL

## 2023-09-14 DIAGNOSIS — E10.9 TYPE 1 DIABETES MELLITUS WITHOUT COMPLICATION (HCC): ICD-10-CM

## 2023-09-14 DIAGNOSIS — N92.1 MENORRHAGIA WITH IRREGULAR CYCLE: ICD-10-CM

## 2023-09-14 DIAGNOSIS — K90.49 FOOD INTOLERANCE: ICD-10-CM

## 2023-09-14 DIAGNOSIS — R10.84 GENERALIZED ABDOMINAL PAIN: ICD-10-CM

## 2023-09-14 DIAGNOSIS — E03.8 HYPOTHYROIDISM DUE TO HASHIMOTO'S THYROIDITIS: ICD-10-CM

## 2023-09-14 DIAGNOSIS — E06.3 HYPOTHYROIDISM DUE TO HASHIMOTO'S THYROIDITIS: ICD-10-CM

## 2023-09-14 LAB
ALBUMIN SERPL BCP-MCNC: 4.1 G/DL (ref 3.2–4.9)
ALBUMIN/GLOB SERPL: 1.5 G/DL
ALP SERPL-CCNC: 62 U/L (ref 30–99)
ALT SERPL-CCNC: 7 U/L (ref 2–50)
ANION GAP SERPL CALC-SCNC: 11 MMOL/L (ref 7–16)
AST SERPL-CCNC: 11 U/L (ref 12–45)
BASOPHILS # BLD AUTO: 0.3 % (ref 0–1.8)
BASOPHILS # BLD: 0.03 K/UL (ref 0–0.12)
BILIRUB SERPL-MCNC: 0.3 MG/DL (ref 0.1–1.5)
BUN SERPL-MCNC: 9 MG/DL (ref 8–22)
CALCIUM ALBUM COR SERPL-MCNC: 8.9 MG/DL (ref 8.5–10.5)
CALCIUM SERPL-MCNC: 9 MG/DL (ref 8.5–10.5)
CHLORIDE SERPL-SCNC: 104 MMOL/L (ref 96–112)
CHOLEST SERPL-MCNC: 197 MG/DL (ref 100–199)
CO2 SERPL-SCNC: 20 MMOL/L (ref 20–33)
CREAT SERPL-MCNC: 0.65 MG/DL (ref 0.5–1.4)
EOSINOPHIL # BLD AUTO: 0.16 K/UL (ref 0–0.51)
EOSINOPHIL NFR BLD: 1.6 % (ref 0–6.9)
ERYTHROCYTE [DISTWIDTH] IN BLOOD BY AUTOMATED COUNT: 47.6 FL (ref 35.9–50)
EST. AVERAGE GLUCOSE BLD GHB EST-MCNC: 171 MG/DL
FSH SERPL-ACNC: 5.5 MIU/ML
GFR SERPLBLD CREATININE-BSD FMLA CKD-EPI: 110 ML/MIN/1.73 M 2
GLOBULIN SER CALC-MCNC: 2.8 G/DL (ref 1.9–3.5)
GLUCOSE SERPL-MCNC: 135 MG/DL (ref 65–99)
HBA1C MFR BLD: 7.6 % (ref 4–5.6)
HCT VFR BLD AUTO: 38.2 % (ref 37–47)
HDLC SERPL-MCNC: 44 MG/DL
HGB BLD-MCNC: 12.6 G/DL (ref 12–16)
IMM GRANULOCYTES # BLD AUTO: 0.04 K/UL (ref 0–0.11)
IMM GRANULOCYTES NFR BLD AUTO: 0.4 % (ref 0–0.9)
LDLC SERPL CALC-MCNC: 108 MG/DL
LH SERPL-ACNC: 21.9 IU/L
LYMPHOCYTES # BLD AUTO: 3.01 K/UL (ref 1–4.8)
LYMPHOCYTES NFR BLD: 29.4 % (ref 22–41)
MCH RBC QN AUTO: 32.1 PG (ref 27–33)
MCHC RBC AUTO-ENTMCNC: 33 G/DL (ref 32.2–35.5)
MCV RBC AUTO: 97.4 FL (ref 81.4–97.8)
MONOCYTES # BLD AUTO: 0.63 K/UL (ref 0–0.85)
MONOCYTES NFR BLD AUTO: 6.1 % (ref 0–13.4)
NEUTROPHILS # BLD AUTO: 6.38 K/UL (ref 1.82–7.42)
NEUTROPHILS NFR BLD: 62.2 % (ref 44–72)
NRBC # BLD AUTO: 0 K/UL
NRBC BLD-RTO: 0 /100 WBC (ref 0–0.2)
PLATELET # BLD AUTO: 263 K/UL (ref 164–446)
PMV BLD AUTO: 9.4 FL (ref 9–12.9)
POTASSIUM SERPL-SCNC: 4 MMOL/L (ref 3.6–5.5)
PROT SERPL-MCNC: 6.9 G/DL (ref 6–8.2)
RBC # BLD AUTO: 3.92 M/UL (ref 4.2–5.4)
SODIUM SERPL-SCNC: 135 MMOL/L (ref 135–145)
T3FREE SERPL-MCNC: 2.36 PG/ML (ref 2–4.4)
T4 FREE SERPL-MCNC: 1.39 NG/DL (ref 0.93–1.7)
TRIGL SERPL-MCNC: 223 MG/DL (ref 0–149)
TSH SERPL DL<=0.005 MIU/L-ACNC: 10.6 UIU/ML (ref 0.38–5.33)
WBC # BLD AUTO: 10.3 K/UL (ref 4.8–10.8)

## 2023-09-14 PROCEDURE — 80053 COMPREHEN METABOLIC PANEL: CPT

## 2023-09-14 PROCEDURE — 36415 COLL VENOUS BLD VENIPUNCTURE: CPT

## 2023-09-14 PROCEDURE — 84443 ASSAY THYROID STIM HORMONE: CPT

## 2023-09-14 PROCEDURE — 82784 ASSAY IGA/IGD/IGG/IGM EACH: CPT

## 2023-09-14 PROCEDURE — 84439 ASSAY OF FREE THYROXINE: CPT

## 2023-09-14 PROCEDURE — 83001 ASSAY OF GONADOTROPIN (FSH): CPT

## 2023-09-14 PROCEDURE — 83002 ASSAY OF GONADOTROPIN (LH): CPT

## 2023-09-14 PROCEDURE — 80061 LIPID PANEL: CPT

## 2023-09-14 PROCEDURE — 86364 TISS TRNSGLTMNASE EA IG CLAS: CPT

## 2023-09-14 PROCEDURE — 85025 COMPLETE CBC W/AUTO DIFF WBC: CPT

## 2023-09-14 PROCEDURE — 86003 ALLG SPEC IGE CRUDE XTRC EA: CPT | Mod: 91

## 2023-09-14 PROCEDURE — 84481 FREE ASSAY (FT-3): CPT

## 2023-09-14 PROCEDURE — 83036 HEMOGLOBIN GLYCOSYLATED A1C: CPT

## 2023-09-16 LAB
IGA SERPL-MCNC: 332 MG/DL (ref 68–408)
TTG IGA SER IA-ACNC: <2 U/ML (ref 0–3)

## 2023-09-17 LAB
ALMOND IGE QN: <0.1 KU/L
ASPARAGUS IGE QN: <0.1 KU/L
AVOCADO IGE QN: <0.1 KU/L
BAKER'S YEAST IGE QN: <0.1 KU/L
BANANA IGE QN: <0.1 KU/L
BASIL IGE QN: <0.1 KU/L
BAYLEAF IGE QN: <0.1 KU/L
BEEF IGE QN: <0.1 KU/L
BEET IGE QN: <0.1 KU/L
BELL PEPPER IGE QN: <0.1 KU/L
BLACK PEPPER IGE QN: <0.1 KU/L
BLUE MUSSEL IGE QN: <0.1 KU/L
BLUEBERRY IGE QN: <0.1 KU/L
BRAZIL NUT IGE QN: <0.1 KU/L
BROCCOLI IGE QN: <0.1 KU/L
BUCKWHEAT IGE QN: <0.1 KU/L
CABBAGE IGE QN: <0.1 KU/L
CARROT IGE QN: <0.1 KU/L
CASHEW NUT IGE QN: <0.1 KU/L
CHESTNUT IGE QN: <0.1 KU/L
CHICKPEA IGE AB [UNITS/VOLUME] IN SERUM: <0.1 KU/L
CHOCOLATE IGE QN: <0.1 KU/L
CINNAMON IGE QN: <0.1 KU/L
CLAM IGE QN: <0.1 KU/L
COCONUT IGE QN: <0.1 KU/L
CODFISH IGE QN: <0.1 KU/L
COW MILK IGE QN: <0.1 KU/L
CRAB IGE QN: <0.1 KU/L
CUCUMBER IGE QN: <0.1 KU/L
CULTIVATED COTTON IGE QN: <0.1 KU/L
DEPRECATED MISC ALLERGEN IGE RAST QL: NORMAL
DILL IGE QN: <0.1 KU/L
EGG WHITE IGE QN: <0.1 KU/L
GINGER IGE QN: 0.13 KU/L
GRAPE IGE QN: <0.1 KU/L
HALIBUT IGE QN: <0.1 KU/L
HAZELNUT IGE QN: <0.1 KU/L
LETTUCE IGE QN: <0.1 KU/L
LIMA BEAN IGE QN: <0.1 KU/L
MELON IGE QN: <0.1 KU/L
ONION IGE QN: <0.1 KU/L
ORANGE IGE QN: <0.1 KU/L
OREGANO IGE QN: <0.1 KU/L
PEA IGE QN: <0.1 KU/L
PEANUT IGE QN: <0.1 KU/L
PEAR IGE QN: <0.1 KU/L
PLUM IGE QN: <0.1 KU/L
PORK IGE QN: <0.1 KU/L
POTATO IGE QN: <0.1 KU/L
RASPBERRY IGE QN: <0.1 KU/L
SESAME SEED IGE QN: <0.1 KU/L
SHRIMP IGE QN: <0.1 KU/L
SOYBEAN IGE QN: <0.1 KU/L
TEA IGE QN: <0.1 KU/L
TOMATO IGE QN: <0.1 KU/L
TROUT IGE QN: <0.1 KU/L
TURKEY MEAT IGE QN: <0.1 KU/L
WALNUT IGE QN: <0.1 KU/L
WATERMELON IGE QN: <0.1 KU/L

## 2023-09-21 ENCOUNTER — TELEMEDICINE (OUTPATIENT)
Dept: MEDICAL GROUP | Facility: MEDICAL CENTER | Age: 48
End: 2023-09-21
Payer: COMMERCIAL

## 2023-09-21 DIAGNOSIS — E03.8 HYPOTHYROIDISM DUE TO HASHIMOTO'S THYROIDITIS: ICD-10-CM

## 2023-09-21 DIAGNOSIS — E10.9 TYPE 1 DIABETES MELLITUS WITHOUT COMPLICATION (HCC): ICD-10-CM

## 2023-09-21 DIAGNOSIS — E06.3 HYPOTHYROIDISM DUE TO HASHIMOTO'S THYROIDITIS: ICD-10-CM

## 2023-09-21 DIAGNOSIS — N92.4 MENORRHAGIA, PREMENOPAUSAL: ICD-10-CM

## 2023-09-21 DIAGNOSIS — E78.5 HYPERLIPIDEMIA, UNSPECIFIED HYPERLIPIDEMIA TYPE: ICD-10-CM

## 2023-09-21 PROCEDURE — 99214 OFFICE O/P EST MOD 30 MIN: CPT | Mod: 95 | Performed by: NURSE PRACTITIONER

## 2023-09-21 RX ORDER — LEVOTHYROXINE SODIUM 0.2 MG/1
200 TABLET ORAL
Qty: 90 TABLET | Refills: 0 | Status: SHIPPED | OUTPATIENT
Start: 2023-09-21 | End: 2023-09-21

## 2023-09-21 NOTE — ASSESSMENT & PLAN NOTE
Latest Reference Range & Units 09/14/23 15:20   Cholesterol,Tot 100 - 199 mg/dL 197   Triglycerides 0 - 149 mg/dL 223 (H)   HDL >=40 mg/dL 44   LDL <100 mg/dL 108 (H)   (H): Data is abnormally high.  The 10-year ASCVD risk score (Chepe BOYD, et al., 2019) is: 1.6%    Values used to calculate the score:      Age: 48 years      Sex: Female      Is Non- : No      Diabetic: Yes      Tobacco smoker: No      Systolic Blood Pressure: 104 mmHg      Is BP treated: No      HDL Cholesterol: 44 mg/dL      Total Cholesterol: 197 mg/dL

## 2023-09-21 NOTE — PROGRESS NOTES
Virtual Visit: Established Patient   This visit was conducted via Zoom using secure and encrypted videoconferencing technology.   The patient was in their home in the [facility].    The patient's identity was confirmed and verbal consent was obtained for this virtual visit.     Subjective:   CC:   Chief Complaint   Patient presents with    Lab Results       Aleena Diana is a 48 y.o. female presenting for evaluation and management of:    Hypothyroidism due to Hashimoto's thyroiditis   Latest Reference Range & Units 09/14/23 15:20   TSH 0.380 - 5.330 uIU/mL 10.600 (H)   Free T-4 0.93 - 1.70 ng/dL 1.39   T3,Free 2.00 - 4.40 pg/mL 2.36   (H): Data is abnormally high.  Chronic problem. Taking Levothyroxine 175 mcg. Reports sluggishness. Generic historically not effective, would like to start on Brand.     Menorrhagia, premenopausal  New problem.  Menorrhagia x1 month.  Recent CBC with decreased H&H and RBC.  Patient went to urgent care pregnancy, HCG negative.  Awaiting appointment with OB/GYN. Hx of regular menses.    Latest Reference Range & Units 04/20/23 12:37 09/14/23 15:19   RBC 4.20 - 5.40 M/uL 4.63 3.92 (L)   Hemoglobin 12.0 - 16.0 g/dL 15.0 12.6   Hematocrit 37.0 - 47.0 % 43.3 38.2   (L): Data is abnormally low  Started taking iron supplements     Type 1 diabetes mellitus without complication (HCC)   Latest Reference Range & Units 09/14/23 15:20   Glycohemoglobin 4.0 - 5.6 % 7.6 (H)   (H): Data is abnormally high  Chronic and stable, following with Dr. Marko garcia.    Hyperlipidemia   Latest Reference Range & Units 09/14/23 15:20   Cholesterol,Tot 100 - 199 mg/dL 197   Triglycerides 0 - 149 mg/dL 223 (H)   HDL >=40 mg/dL 44   LDL <100 mg/dL 108 (H)   (H): Data is abnormally high.  The 10-year ASCVD risk score (Chepe BOYD, et al., 2019) is: 1.6%    Values used to calculate the score:      Age: 48 years      Sex: Female      Is Non- : No      Diabetic: Yes      Tobacco  smoker: No      Systolic Blood Pressure: 104 mmHg      Is BP treated: No      HDL Cholesterol: 44 mg/dL      Total Cholesterol: 197 mg/dL     ROS     Current medicines (including changes today)  Current Outpatient Medications   Medication Sig Dispense Refill    levothyroxine (SYNTHROID) 200 MCG Tab Take 1 Tablet by mouth every morning on an empty stomach. 90 Tablet 0    insulin lispro (HUMALOG KWIKPEN) 100 UNIT/ML SC SOPN injection PEN Inject 10 Units under the skin 4 Times a Day,Before Meals and at Bedtime. 3 mL 3    VITAMIN D PO Take  by mouth.      EPINEPHrine (EPIPEN) 0.3 MG/0.3ML Solution Auto-injector solution for injection INJECT 0.3 ML INTO THE SHOULDER, THIGH, OR BUTTOCKS ONE TIME FOR 1 DOSE.       No current facility-administered medications for this visit.       Patient Active Problem List    Diagnosis Date Noted    Menorrhagia, premenopausal 09/21/2023    Hyperlipidemia 09/21/2023    Generalized abdominal pain 09/05/2023    Tuberculosis screening 05/12/2023    Leukemoid reaction 04/21/2023    Routine screening for STI (sexually transmitted infection) 03/15/2023    Dysuria 09/07/2022    Acute pain of left knee 09/07/2022    Hypertensive crisis 05/26/2022    Skin tag 05/27/2021    Excessive sexual drive 04/06/2021    Encounter for surveillance of contraceptive pills 04/06/2021    Vaginal yeast infection 04/06/2021    Encounter for long-term methadone use 04/24/2020    Encounter for long-term (current) use of insulin (HCC) 01/17/2020    Hyperglycemia 01/17/2020    Hypothyroidism 01/17/2020    Type 1 diabetes mellitus without complication (HCC) 01/17/2020    Skin lesion 11/06/2018    Right hand pain 04/09/2018    Plantar fasciitis 04/09/2018    Fatty liver 04/21/2017    Elevated LFTs 04/20/2017    Tonsillith 04/19/2017    Vitamin D deficiency 01/20/2017    IUD (intrauterine device) in place 01/17/2017    Chronic fatigue 01/17/2017    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    Physical Exam:  Constitutional: Alert, no distress, well-groomed.  Skin: No rashes in visible areas.  Eye: Round. Conjunctiva clear, lids normal. No icterus.   ENMT: Lips pink without lesions, good dentition, moist mucous membranes. Phonation normal.  Neck: No masses, no thyromegaly. Moves freely without pain.  Respiratory: Unlabored respiratory effort, no cough or audible wheeze  Psych: Alert and oriented x3, normal affect and mood.     Assessment and Plan:   The following treatment plan was discussed:   1. Hypothyroidism due to Hashimoto's thyroiditis  Uncontrolled, stable.  Will restart on brand Synthroid 200 mcg.  We will repeat labs in 6 weeks and follow-up in 8 weeks.  - levothyroxine (SYNTHROID) 200 MCG Tab; Take 1 Tablet by mouth every morning on an empty stomach.  Dispense: 90 Tablet; Refill: 0  - TSH; Future  - T3 FREE; Future  - REVERSE T3; Future  - FREE THYROXINE; Future  - CBC WITHOUT DIFFERENTIAL; Future    2. Menorrhagia, premenopausal  Uncontrolled, pelvic complete ultrasound.  Patient to follow-up with GYN.  - US-PELVIC COMPLETE (TRANSABDOMINAL/TRANSVAGINAL) (COMBO); Future    3. Type 1 diabetes mellitus without complication (HCC)  Stable on current regimen.  Patient is following with Endo    4. Hyperlipidemia, unspecified hyperlipidemia type  Stable.  Continue healthy lifestyle, exercise and healthy diet       Discussed with patient possible alternative diagnoses, patient is to take all medications as prescribed.      If symptoms persist FU w/PCP, if symptoms worsen go to emergency room.      If experiencing any side effects from prescribed medications report to the office immediately or go to  emergency room.     Reviewed indication, dosage, usage and potential adverse effects of prescribed medications.      Reviewed risks and benefits of treatment plan. Patient verbalizes understanding of all instruction and verbally agrees to plan.     Discussed plan with the patient, and patient agrees to the above.      I personally reviewed prior external notes and test results pertinent to today's visit.     Follow-up: 8 wks

## 2023-09-21 NOTE — ASSESSMENT & PLAN NOTE
Latest Reference Range & Units 09/14/23 15:20   Glycohemoglobin 4.0 - 5.6 % 7.6 (H)   (H): Data is abnormally high  Chronic and stable, following with Dr. Marko garcia.

## 2023-09-21 NOTE — ASSESSMENT & PLAN NOTE
New problem.  Menorrhagia x1 month.  Recent CBC with decreased H&H and RBC.  Patient went to urgent care pregnancy, HCG negative.  Awaiting appointment with OB/GYN. Hx of regular menses.    Latest Reference Range & Units 04/20/23 12:37 09/14/23 15:19   RBC 4.20 - 5.40 M/uL 4.63 3.92 (L)   Hemoglobin 12.0 - 16.0 g/dL 15.0 12.6   Hematocrit 37.0 - 47.0 % 43.3 38.2   (L): Data is abnormally low  Started taking iron supplements

## 2023-09-21 NOTE — ASSESSMENT & PLAN NOTE
Latest Reference Range & Units 09/14/23 15:20   TSH 0.380 - 5.330 uIU/mL 10.600 (H)   Free T-4 0.93 - 1.70 ng/dL 1.39   T3,Free 2.00 - 4.40 pg/mL 2.36   (H): Data is abnormally high.  Chronic problem. Taking Levothyroxine 175 mcg. Reports sluggishness. Generic historically not effective, would like to start on Brand.

## 2023-09-26 ENCOUNTER — GYNECOLOGY VISIT (OUTPATIENT)
Dept: OBGYN | Facility: CLINIC | Age: 48
End: 2023-09-26
Payer: COMMERCIAL

## 2023-09-26 VITALS
SYSTOLIC BLOOD PRESSURE: 112 MMHG | DIASTOLIC BLOOD PRESSURE: 62 MMHG | BODY MASS INDEX: 26.03 KG/M2 | WEIGHT: 162 LBS | HEIGHT: 66 IN

## 2023-09-26 DIAGNOSIS — N92.4 MENORRHAGIA, PREMENOPAUSAL: ICD-10-CM

## 2023-09-26 PROCEDURE — 99213 OFFICE O/P EST LOW 20 MIN: CPT | Performed by: OBSTETRICS & GYNECOLOGY

## 2023-09-26 PROCEDURE — 3078F DIAST BP <80 MM HG: CPT | Performed by: OBSTETRICS & GYNECOLOGY

## 2023-09-26 PROCEDURE — 3074F SYST BP LT 130 MM HG: CPT | Performed by: OBSTETRICS & GYNECOLOGY

## 2023-09-26 RX ORDER — PNV NO.95/FERROUS FUM/FOLIC AC 28MG-0.8MG
325 TABLET ORAL 2 TIMES DAILY
COMMUNITY

## 2023-09-26 ASSESSMENT — FIBROSIS 4 INDEX: FIB4 SCORE: 0.73

## 2023-09-26 NOTE — PROGRESS NOTES
Chief Complaint   Patient presents with    Gynecologic Exam     [facility] has had period for last month   Heavy bleeding    History of present illness:   48 y.o.  presents with above chief complaint.  Patient reports her cycles have always been regular occurring approximately 20 days or so.  However, she reports that this month she had her cycle for approximately 3 weeks passing a large variety of clots.  It then slowed down and became scant but now starting 2 days ago, she began to have heavy vaginal bleeding once again with the passage of large clots.  She reports this has not happened in the past.  She does have a history of IUD use in the past which she reports caused her to have significant vaginal bleeding and discharge.  Similar issues with birth control pills    ROS: Pertinent positives documented in HPI and all other systems reviewed & are negative    POBHx:  2 para 2-0-0-2    PGYNHx: As above    All PMH, PSH, meds, allergies, social history and FH reviewed and updated today:  Past Medical History:   Diagnosis Date    Anxiety     Diabetes (HCC)     Palpitations     Thyroid disease        Past Surgical History:   Procedure Laterality Date    APPENDECTOMY      CHOLECYSTECTOMY      PRIMARY C SECTION         Allergies:   Allergies   Allergen Reactions    Bee Venom     Codeine     Other Drug     Penicillins     Tree Nuts Food Allergy     Azithromycin Rash     Rash on  my chest    Bee     Clindamycin Hives    Influenza Vaccines Swelling     Arm became swollen and red    Ivp [Iodine]     Other Food      Nuts    Tape        Social History     Socioeconomic History    Marital status:      Spouse name: Not on file    Number of children: Not on file    Years of education: Not on file    Highest education level: Master's degree (e.g., MA, MS, Ghassan, MEd, MSW, TOM)   Occupational History    Occupation: RN management   Tobacco Use    Smoking status: Every Day     Current packs/day: 0.00     Average  packs/day: 1 pack/day for 20.0 years (20.0 ttl pk-yrs)     Types: Cigarettes     Start date: 5/6/1993     Last attempt to quit: 5/6/2013     Years since quitting: 10.3    Smokeless tobacco: Never   Vaping Use    Vaping Use: Never used   Substance and Sexual Activity    Alcohol use: Yes     Alcohol/week: 0.0 oz     Comment: occasional    Drug use: Yes     Types: Marijuana, Oral     Comment: 1x nightly    Sexual activity: Yes     Partners: Male     Birth control/protection: None   Other Topics Concern    Not on file   Social History Narrative    Not on file     Social Determinants of Health     Financial Resource Strain: Low Risk  (3/31/2022)    Overall Financial Resource Strain (CARDIA)     Difficulty of Paying Living Expenses: Not hard at all   Food Insecurity: No Food Insecurity (3/31/2022)    Hunger Vital Sign     Worried About Running Out of Food in the Last Year: Never true     Ran Out of Food in the Last Year: Never true   Transportation Needs: No Transportation Needs (3/31/2022)    PRAPARE - Transportation     Lack of Transportation (Medical): No     Lack of Transportation (Non-Medical): No   Physical Activity: Sufficiently Active (3/31/2022)    Exercise Vital Sign     Days of Exercise per Week: 5 days     Minutes of Exercise per Session: 30 min   Stress: No Stress Concern Present (3/31/2022)    Citizen of [place] of Occupational Health - Occupational Stress Questionnaire     Feeling of Stress : Not at all   Social Connections: Moderately Integrated (3/31/2022)    Social Connection and Isolation Panel [NHANES]     Frequency of Communication with Friends and Family: Twice a week     Frequency of Social Gatherings with Friends and Family: Twice a week     Attends Adventism Services: Never     Active Member of Clubs or Organizations: Yes     Attends Club or Organization Meetings: 1 to 4 times per year     Marital Status:    Intimate Partner Violence: Not on file   Housing Stability: Low Risk  (3/31/2022)     "Housing Stability Vital Sign     Unable to Pay for Housing in the Last Year: No     Number of Places Lived in the Last Year: 1     Unstable Housing in the Last Year: No       Family History   Problem Relation Age of Onset    Hypertension Mother     Hyperlipidemia Mother     Cancer Father     Hypertension Father     Cancer Maternal Grandfather         lung    Diabetes Neg Hx     Heart Disease Neg Hx     Stroke Neg Hx        Physical exam:  /62 (BP Location: Right arm, Patient Position: Sitting, BP Cuff Size: Adult)   Ht 5' 6\"   Wt 162 lb     GENERAL APPEARANCE: healthy, alert, no distress  EXTREMITIES:negative clubbing, cyanosis, edema    NEURO Awake, alert and oriented x 3, Normal gait, no sensory deficits  SKIN No rashes, or ulcers or lesions seen  PSYCHIATRIC: Patient shows appropriate affect, is alert and oriented x3, intact judgment and insight.      Assessment:  1. Menorrhagia, premenopausal            Plan: Patient was seen via telemedicine by her primary care provider, they have ordered labs as well as an ultrasound for evaluation.  I did discuss with the patient about the possibility of attempting to control her vaginal bleeding at this time with some medication.  Patient is not interested in any medication at this time.    I had a brief discussion then with the patient about the options for long-term management, including birth-control pills, Depo-Provera, hormonal IUD, tranexamic acid.  Surgical management in the form of ablation as well as hysterectomy also discussed with the patient.    As the patient does not desire any medication at this time, precautions were discussed with her once again.    She was asked to have the ultrasound and labs performed as soon as possible.  Follow-up once results are in.  Discussed with patient that if this continues to or she has any further episodes, that I would recommend an endometrial biopsy at that time    All questions answered  "

## 2023-09-26 NOTE — PROGRESS NOTES
Patient here for GYN exam.  [facility], has had period for last month  LMP= 8/30/23  BCM: none  Last pap: 2-3 years ago, not due until 5  Last mammogram if applies: 5/23  Phone number: [phone]  Pharmacy verified

## 2023-10-22 ENCOUNTER — HOSPITAL ENCOUNTER (OUTPATIENT)
Dept: RADIOLOGY | Facility: MEDICAL CENTER | Age: 48
End: 2023-10-22
Attending: NURSE PRACTITIONER
Payer: COMMERCIAL

## 2023-10-22 DIAGNOSIS — N92.4 MENORRHAGIA, PREMENOPAUSAL: ICD-10-CM

## 2023-10-22 PROCEDURE — 76830 TRANSVAGINAL US NON-OB: CPT

## 2023-12-19 ENCOUNTER — OFFICE VISIT (OUTPATIENT)
Dept: URGENT CARE | Facility: PHYSICIAN GROUP | Age: 48
End: 2023-12-19
Payer: COMMERCIAL

## 2023-12-19 VITALS
TEMPERATURE: 97.5 F | HEIGHT: 66 IN | DIASTOLIC BLOOD PRESSURE: 62 MMHG | RESPIRATION RATE: 20 BRPM | WEIGHT: 158 LBS | HEART RATE: 83 BPM | SYSTOLIC BLOOD PRESSURE: 106 MMHG | BODY MASS INDEX: 25.39 KG/M2 | OXYGEN SATURATION: 96 %

## 2023-12-19 DIAGNOSIS — U07.1 COVID-19 VIRUS INFECTION: ICD-10-CM

## 2023-12-19 DIAGNOSIS — H65.192 OTHER NON-RECURRENT ACUTE NONSUPPURATIVE OTITIS MEDIA OF LEFT EAR: ICD-10-CM

## 2023-12-19 PROCEDURE — 3078F DIAST BP <80 MM HG: CPT | Performed by: STUDENT IN AN ORGANIZED HEALTH CARE EDUCATION/TRAINING PROGRAM

## 2023-12-19 PROCEDURE — 3074F SYST BP LT 130 MM HG: CPT | Performed by: STUDENT IN AN ORGANIZED HEALTH CARE EDUCATION/TRAINING PROGRAM

## 2023-12-19 PROCEDURE — 99213 OFFICE O/P EST LOW 20 MIN: CPT | Performed by: STUDENT IN AN ORGANIZED HEALTH CARE EDUCATION/TRAINING PROGRAM

## 2023-12-19 RX ORDER — DOXYCYCLINE 100 MG/1
100 CAPSULE ORAL 2 TIMES DAILY
Qty: 10 CAPSULE | Refills: 0 | Status: SHIPPED | OUTPATIENT
Start: 2023-12-19 | End: 2023-12-24

## 2023-12-19 ASSESSMENT — FIBROSIS 4 INDEX: FIB4 SCORE: 0.73

## 2023-12-19 NOTE — PROGRESS NOTES
Subjective:   CHIEF COMPLAINT  Chief Complaint   Patient presents with    Ear Pain     Right; few hours     Covid Home Monitoring     2x days; Pt states that he was positive at home.        HPI  Aleena Diana is a 48 y.o. female who presents with a chief complaint of left ear pain which started this morning.  She tested positive for COVID-19 three days ago.  Overall symptoms improved however she was blowing her nose this morning and developed severe left ear pain.  Says that symptoms are slightly better however continue to persist.  Says she felt nauseous so did not take any medications.  There has been no drainage from the ear.  No fevers.    REVIEW OF SYSTEMS  General: no fever or chills  GI: no nausea or vomiting  See HPI for further details.    PAST MEDICAL HISTORY  Patient Active Problem List    Diagnosis Date Noted    Menorrhagia, premenopausal 09/21/2023    Hyperlipidemia 09/21/2023    Generalized abdominal pain 09/05/2023    Tuberculosis screening 05/12/2023    Leukemoid reaction 04/21/2023    Routine screening for STI (sexually transmitted infection) 03/15/2023    Dysuria 09/07/2022    Acute pain of left knee 09/07/2022    Hypertensive crisis 05/26/2022    Skin tag 05/27/2021    Excessive sexual drive 04/06/2021    Encounter for surveillance of contraceptive pills 04/06/2021    Vaginal yeast infection 04/06/2021    Encounter for long-term methadone use 04/24/2020    Encounter for long-term (current) use of insulin (HCC) 01/17/2020    Hyperglycemia 01/17/2020    Hypothyroidism 01/17/2020    Type 1 diabetes mellitus without complication (HCC) 01/17/2020    Skin lesion 11/06/2018    Right hand pain 04/09/2018    Plantar fasciitis 04/09/2018    Fatty liver 04/21/2017    Elevated LFTs 04/20/2017    Tonsillith 04/19/2017    Vitamin D deficiency 01/20/2017    IUD (intrauterine device) in place 01/17/2017    Chronic fatigue 01/17/2017    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    ALLERGIES  Allergies   Allergen Reactions    Bee Venom     Codeine     Other Drug     Penicillins     Tree Nuts Food Allergy     Azithromycin Rash     Rash on  my chest    Bee     Clindamycin Hives    Influenza Vaccines Swelling     Arm became swollen and red    Ivp [Iodine]     Other Food      Nuts    Tape        CURRENT MEDICATIONS  Home Medications       Reviewed by Jb Sosa D.O. (Physician) on 12/19/23 at 1335  Med List Status: <None>     Medication Last Dose Status   doxycycline (MONODOX) 100 MG capsule  Active   EPINEPHrine (EPIPEN) 0.3 MG/0.3ML Solution Auto-injector solution for injection Taking Active   Ferrous Sulfate (IRON) 325 (65 Fe) MG Tab Taking Active   insulin lispro (HUMALOG KWIKPEN) 100 UNIT/ML SC SOPN injection PEN Taking Active   levothyroxine (SYNTHROID) 200 MCG Tab Taking Active   VITAMIN D PO Taking Active                    SOCIAL HISTORY  Social History     Tobacco Use    Smoking status: Every Day     Current packs/day: 0.00     Average packs/day: 1 pack/day for 20.0 years (20.0 ttl pk-yrs)     Types: Cigarettes     Start date: 5/6/1993     Last attempt to quit: 5/6/2013     Years since quitting: 10.6    Smokeless tobacco: Never   Vaping Use    Vaping Use: Never used   Substance and Sexual Activity    Alcohol use: Yes     Alcohol/week: 0.0 oz     Comment: occasional    Drug use: Yes     Types: Marijuana, Oral     Comment: 1x nightly    Sexual activity: Yes     Partners: Male     Birth control/protection: None       FAMILY HISTORY  Family History   Problem Relation Age of Onset    Hypertension Mother     Hyperlipidemia  "Mother     Cancer Father     Hypertension Father     Cancer Maternal Grandfather         lung    Diabetes Neg Hx     Heart Disease Neg Hx     Stroke Neg Hx           Objective:   PHYSICAL EXAM  VITAL SIGNS: /62 (BP Location: Right arm, Patient Position: Sitting, BP Cuff Size: Adult)   Pulse 83   Temp 36.4 °C (97.5 °F) (Temporal)   Resp 20   Ht 1.676 m (5' 6\")   Wt 71.7 kg (158 lb)   SpO2 96%   BMI 25.50 kg/m²     Gen: no acute distress, normal voice  Skin: dry, intact, moist mucosal membranes  Eyes: No conjunctival injection b/l  Neck: Normal range of motion. No meningeal signs.   ENT: Left TM with mild erythema and mild bulging without obvious effusion.  Normal light reflex.  Right TM clear intact without bulging, erythema or effusion.  Lungs: No increased work of breathing.  CTAB w/ symmetric expansion  CV: RRR w/o murmurs or clicks  Psych: normal affect, normal judgement, alert, awake    Assessment/Plan:     1. COVID-19 virus infection        2. Other non-recurrent acute nonsuppurative otitis media of left ear  doxycycline (MONODOX) 100 MG capsule      Known COVID-19 infection.  Early signs of secondary AOM.  History of anaphylaxis to penicillins.  -Ibuprofen 800 mg every 8 hours as needed for symptomatic relief  -Ordered doxycycline.  Instructed to begin in 24 to 48 hours only if still symptomatic  -Return to urgent care any new/worsening symptoms or further questions or concerns.  Patient understood everything discussed.  All questions were answered.      Differential diagnosis and supportive care discussed. Follow-up as needed if symptoms worsen or fail to improve to PCP, Urgent care or Emergency Room.    Please note that this dictation was created using voice recognition software. I have made a reasonable attempt to correct obvious errors, but I expect that there are errors of grammar and possibly content that I did not discover before finalizing the note.         "

## 2024-01-04 ENCOUNTER — OFFICE VISIT (OUTPATIENT)
Dept: MEDICAL GROUP | Facility: MEDICAL CENTER | Age: 49
End: 2024-01-04
Payer: COMMERCIAL

## 2024-01-04 VITALS
SYSTOLIC BLOOD PRESSURE: 110 MMHG | RESPIRATION RATE: 16 BRPM | BODY MASS INDEX: 24.45 KG/M2 | WEIGHT: 152.12 LBS | DIASTOLIC BLOOD PRESSURE: 58 MMHG | TEMPERATURE: 97.7 F | HEART RATE: 81 BPM | OXYGEN SATURATION: 96 % | HEIGHT: 66 IN

## 2024-01-04 DIAGNOSIS — Z12.83 SCREENING FOR SKIN CANCER: ICD-10-CM

## 2024-01-04 DIAGNOSIS — R22.31 LUMP IN ARMPIT, RIGHT: ICD-10-CM

## 2024-01-04 DIAGNOSIS — L73.2 HIDRADENITIS SUPPURATIVA: ICD-10-CM

## 2024-01-04 DIAGNOSIS — L91.8 SKIN TAG: ICD-10-CM

## 2024-01-04 DIAGNOSIS — J06.9 RECENT URI: ICD-10-CM

## 2024-01-04 DIAGNOSIS — Z12.11 SCREEN FOR COLON CANCER: ICD-10-CM

## 2024-01-04 PROCEDURE — 3074F SYST BP LT 130 MM HG: CPT | Performed by: NURSE PRACTITIONER

## 2024-01-04 PROCEDURE — 99214 OFFICE O/P EST MOD 30 MIN: CPT | Performed by: NURSE PRACTITIONER

## 2024-01-04 PROCEDURE — 3078F DIAST BP <80 MM HG: CPT | Performed by: NURSE PRACTITIONER

## 2024-01-04 RX ORDER — DOXYCYCLINE HYCLATE 100 MG
100 TABLET ORAL 2 TIMES DAILY
Qty: 14 TABLET | Refills: 0 | Status: SHIPPED | OUTPATIENT
Start: 2024-01-04 | End: 2024-01-11

## 2024-01-04 ASSESSMENT — FIBROSIS 4 INDEX: FIB4 SCORE: 0.73

## 2024-01-04 ASSESSMENT — PATIENT HEALTH QUESTIONNAIRE - PHQ9
5. POOR APPETITE OR OVEREATING: 0 - NOT AT ALL
SUM OF ALL RESPONSES TO PHQ QUESTIONS 1-9: 3
CLINICAL INTERPRETATION OF PHQ2 SCORE: 1

## 2024-01-04 NOTE — ASSESSMENT & PLAN NOTE
New problem. Lump in right armpit noted 1 wk ago, appeared as ingrown hair, getting large and painful, appears as a boil with redness and swelling.  No fever.

## 2024-01-05 ENCOUNTER — HOSPITAL ENCOUNTER (OUTPATIENT)
Dept: RADIOLOGY | Facility: MEDICAL CENTER | Age: 49
End: 2024-01-05
Attending: NURSE PRACTITIONER
Payer: COMMERCIAL

## 2024-01-05 NOTE — ASSESSMENT & PLAN NOTE
Recent URI s/s with severe pain in left ear. Was seen in US, rx'd Doxy did not take as her symptoms resolved. Physical assessment today without symptoms of URI, tympanic membranes are intact bilaterally, no erythema.

## 2024-01-05 NOTE — PROGRESS NOTES
"No chief complaint on file.      HISTORY OF PRESENT ILLNESS: Patient is a 48 y.o. female, established patient who presents today to discuss medical problems as listed below:    Health Maintenance:  COMPLETED       Allergies: Bee venom, Codeine, Other drug, Penicillins, Tree nuts food allergy, Azithromycin, Bee, Clindamycin, Influenza vaccines, Ivp [iodine], Other food, and Tape    Current Outpatient Medications   Medication Sig Dispense Refill    doxycycline (VIBRAMYCIN) 100 MG Tab Take 1 Tablet by mouth 2 times a day for 7 days. 14 Tablet 0    Ferrous Sulfate (IRON) 325 (65 Fe) MG Tab Take 325 mg by mouth 2 times a day.      insulin lispro (HUMALOG KWIKPEN) 100 UNIT/ML SC SOPN injection PEN Inject 10 Units under the skin 4 Times a Day,Before Meals and at Bedtime. 3 mL 3    VITAMIN D PO Take  by mouth.      EPINEPHrine (EPIPEN) 0.3 MG/0.3ML Solution Auto-injector solution for injection INJECT 0.3 ML INTO THE SHOULDER, THIGH, OR BUTTOCKS ONE TIME FOR 1 DOSE.       No current facility-administered medications for this visit.       Allergies, past medical history, past surgical history, family history, social history reviewed and updated.    Review of Systems:     - Constitutional: Negative for fever, chills, unexpected weight change, and fatigue/generalized weakness.       - Respiratory: Negative for cough, sputum production, chest congestion, dyspnea, wheezing, and crackles.      - Cardiovascular: Negative for chest pain, palpitations, orthopnea, and bilateral lower extremity edema.     - Skin: lump in right axilla    - Psychiatric/Behavioral: Negative for depression, suicidal/homicidal ideation and memory loss.      All other systems reviewed and are negative    Exam:    /58   Pulse 81   Temp 36.5 °C (97.7 °F)   Resp 16   Ht 1.676 m (5' 6\")   Wt 69 kg (152 lb 1.9 oz)   SpO2 96%   BMI 24.55 kg/m²  Body mass index is 24.55 kg/m².    Physical Exam:  Constitutional: Well-developed and well-nourished. Not " diaphoretic. No distress.   Skin: noted round tender boil in right axilla, erythema and swelling.   Ears:  External ears unremarkable. Tympanic membranes clear and intact.  Cardiovascular: Regular rate and rhythm, S1 and S2 without murmur, rubs, or gallops.    Chest: Effort normal. Clear to auscultation throughout. No adventitious sounds. Neurological: Alert and oriented x 3. Psychiatric:  Behavior, mood, and affect are appropriate.  MA/nursing note and vitals reviewed.    Assessment/Plan:  Lump in armpit, right  New problem. Lump in right armpit noted 1 wk ago, appeared as ingrown hair, getting large and painful, appears as a boil with redness and swelling.  No fever.     Skin tag  New problem.  Noted a small lesion, initially appeared as  awart, now getting smaller, no pain, swelling or redness.     Recent URI  Recent URI s/s with severe pain in left ear. Was seen in US, rx'd Doxy did not take as her symptoms resolved. Physical assessment today without symptoms of URI, tympanic membranes are intact bilaterally, no erythema.    1. Lump in armpit, right  Uncontrolled, stable.  DDx includes but not limited to ingrown hair, hidradenitis suppurativa.  Trial of doxycycline.  Will obtain an ultrasound.  Patient to follow-up with dermatology.  - doxycycline (VIBRAMYCIN) 100 MG Tab; Take 1 Tablet by mouth 2 times a day for 7 days.  Dispense: 14 Tablet; Refill: 0  - US-EXTREMITY NON VASCULAR UNILATERAL RIGHT; Future  - Referral to Dermatology    2. Screen for colon cancer  - COLOGUARD (FIT DNA)    3. Hidradenitis suppurativa  - Referral to Dermatology    4. Screening for skin cancer  - Referral to Dermatology    5. Skin tag  Improving     6. Recent URI  Resolved       Discussed with patient possible alternative diagnoses, patient is to take all medications as prescribed.      If symptoms persist FU w/PCP, if symptoms worsen go to emergency room.      If experiencing any side effects from prescribed medications report to the  office immediately or go to emergency room.     Reviewed indication, dosage, usage and potential adverse effects of prescribed medications.      Reviewed risks and benefits of treatment plan. Patient verbalizes understanding of all instruction and verbally agrees to plan.     Discussed plan with the patient, and patient agrees to the above.      I personally reviewed prior external notes and test results pertinent to today's visit.      No follow-ups on file. Annual,  PRN

## 2024-01-05 NOTE — ASSESSMENT & PLAN NOTE
New problem.  Noted a small lesion, initially appeared as  awart, now getting smaller, no pain, swelling or redness.

## 2024-01-09 ENCOUNTER — HOSPITAL ENCOUNTER (OUTPATIENT)
Dept: RADIOLOGY | Facility: MEDICAL CENTER | Age: 49
End: 2024-01-09
Attending: NURSE PRACTITIONER
Payer: COMMERCIAL

## 2024-01-09 DIAGNOSIS — R22.31 LUMP IN ARMPIT, RIGHT: ICD-10-CM

## 2024-01-09 PROCEDURE — 76642 ULTRASOUND BREAST LIMITED: CPT | Mod: RT

## 2024-01-09 PROCEDURE — G0279 TOMOSYNTHESIS, MAMMO: HCPCS

## 2024-01-10 DIAGNOSIS — L03.90 CELLULITIS, UNSPECIFIED CELLULITIS SITE: ICD-10-CM

## 2024-01-10 RX ORDER — DOXYCYCLINE HYCLATE 100 MG
100 TABLET ORAL 2 TIMES DAILY
Qty: 14 TABLET | Refills: 0 | Status: SHIPPED | OUTPATIENT
Start: 2024-01-10 | End: 2024-01-17

## 2024-01-10 NOTE — PROGRESS NOTES
IMPRESSION:     1.  Probable benign inflammatory process involving the superficial subcutaneous tissue and deep layer of skin in the right axilla which may be a small abscess versus an inflammatory granulomatous process based on the clinical history and sonographic   appearance.  2.  Clinical follow-up recommended following a course of antibiotic therapy. A dermatologist consult may also be of benefit. If this area continues to enlarge and/or does not resolve over the next 1-2 months then biopsy or excision would be indicated.     These results were given to the patient at the time of visit.     R2 - CATEGORY 2: BENIGN FINDING(S)     Ten to twenty percent of all cancers can be categorized as hereditary and the clinical and financial value of identifying patients and families at risk is well documented. If you have a personal or family history of breast, ovarian, fallopian tube,   peritoneal or other cancer, please consult your physician regarding genetic counseling and testing.              Exam Ended: 01/09/24  8:22 AM

## 2024-01-16 ENCOUNTER — PATIENT MESSAGE (OUTPATIENT)
Dept: MEDICAL GROUP | Facility: MEDICAL CENTER | Age: 49
End: 2024-01-16
Payer: COMMERCIAL

## 2024-01-16 DIAGNOSIS — E03.8 HYPOTHYROIDISM DUE TO HASHIMOTO'S THYROIDITIS: ICD-10-CM

## 2024-01-16 DIAGNOSIS — E06.3 HYPOTHYROIDISM DUE TO HASHIMOTO'S THYROIDITIS: ICD-10-CM

## 2024-01-16 RX ORDER — LEVOTHYROXINE SODIUM 175 UG/1
175 TABLET ORAL
Qty: 90 TABLET | Refills: 0 | Status: SHIPPED | OUTPATIENT
Start: 2024-01-16

## 2024-01-16 NOTE — PATIENT COMMUNICATION
Received request via: Patient    Was the patient seen in the last year in this department? Yes    Does the patient have an active prescription (recently filled or refills available) for medication(s) requested? No    Does the patient have [facility] Plus and need 100 day supply (blood pressure, diabetes and cholesterol meds only)? Patient does not have SCP     Requested Prescriptions     Pending Prescriptions Disp Refills    levothyroxine (SYNTHROID) 175 MCG Tab 90 Tablet 0     Sig: Take 1 Tablet by mouth every morning on an empty stomach.

## 2024-04-07 DIAGNOSIS — E03.8 HYPOTHYROIDISM DUE TO HASHIMOTO'S THYROIDITIS: ICD-10-CM

## 2024-04-07 DIAGNOSIS — E06.3 HYPOTHYROIDISM DUE TO HASHIMOTO'S THYROIDITIS: ICD-10-CM

## 2024-04-09 RX ORDER — LEVOTHYROXINE SODIUM 175 UG/1
175 TABLET ORAL
Qty: 90 TABLET | Refills: 0 | Status: SHIPPED | OUTPATIENT
Start: 2024-04-09

## 2024-07-08 ENCOUNTER — HOSPITAL ENCOUNTER (OUTPATIENT)
Dept: LAB | Facility: MEDICAL CENTER | Age: 49
End: 2024-07-08
Attending: NURSE PRACTITIONER
Payer: COMMERCIAL

## 2024-07-08 LAB
25(OH)D3 SERPL-MCNC: 20 NG/ML (ref 30–100)
ALBUMIN SERPL BCP-MCNC: 4 G/DL (ref 3.2–4.9)
ALBUMIN/GLOB SERPL: 1.4 G/DL
ALP SERPL-CCNC: 58 U/L (ref 30–99)
ALT SERPL-CCNC: 12 U/L (ref 2–50)
ANION GAP SERPL CALC-SCNC: 11 MMOL/L (ref 7–16)
AST SERPL-CCNC: 11 U/L (ref 12–45)
BASOPHILS # BLD AUTO: 0.6 % (ref 0–1.8)
BASOPHILS # BLD: 0.05 K/UL (ref 0–0.12)
BILIRUB SERPL-MCNC: 0.3 MG/DL (ref 0.1–1.5)
BUN SERPL-MCNC: 10 MG/DL (ref 8–22)
CALCIUM ALBUM COR SERPL-MCNC: 9 MG/DL (ref 8.5–10.5)
CALCIUM SERPL-MCNC: 9 MG/DL (ref 8.5–10.5)
CHLORIDE SERPL-SCNC: 102 MMOL/L (ref 96–112)
CHOLEST SERPL-MCNC: 183 MG/DL (ref 100–199)
CO2 SERPL-SCNC: 21 MMOL/L (ref 20–33)
CREAT SERPL-MCNC: 0.65 MG/DL (ref 0.5–1.4)
EOSINOPHIL # BLD AUTO: 0.42 K/UL (ref 0–0.51)
EOSINOPHIL NFR BLD: 5.2 % (ref 0–6.9)
ERYTHROCYTE [DISTWIDTH] IN BLOOD BY AUTOMATED COUNT: 46.5 FL (ref 35.9–50)
EST. AVERAGE GLUCOSE BLD GHB EST-MCNC: 217 MG/DL
GFR SERPLBLD CREATININE-BSD FMLA CKD-EPI: 109 ML/MIN/1.73 M 2
GLOBULIN SER CALC-MCNC: 2.8 G/DL (ref 1.9–3.5)
GLUCOSE SERPL-MCNC: 245 MG/DL (ref 65–99)
HAV IGM SERPL QL IA: NORMAL
HBA1C MFR BLD: 9.2 % (ref 4–5.6)
HBV CORE IGM SER QL: NORMAL
HBV SURFACE AG SER QL: NORMAL
HCT VFR BLD AUTO: 40.8 % (ref 37–47)
HCV AB SER QL: NORMAL
HDLC SERPL-MCNC: 45 MG/DL
HGB BLD-MCNC: 13.3 G/DL (ref 12–16)
HIV 1+2 AB+HIV1 P24 AG SERPL QL IA: NORMAL
IMM GRANULOCYTES # BLD AUTO: 0.02 K/UL (ref 0–0.11)
IMM GRANULOCYTES NFR BLD AUTO: 0.2 % (ref 0–0.9)
LDLC SERPL CALC-MCNC: 107 MG/DL
LYMPHOCYTES # BLD AUTO: 2.63 K/UL (ref 1–4.8)
LYMPHOCYTES NFR BLD: 32.4 % (ref 22–41)
MCH RBC QN AUTO: 29.8 PG (ref 27–33)
MCHC RBC AUTO-ENTMCNC: 32.6 G/DL (ref 32.2–35.5)
MCV RBC AUTO: 91.5 FL (ref 81.4–97.8)
MONOCYTES # BLD AUTO: 0.74 K/UL (ref 0–0.85)
MONOCYTES NFR BLD AUTO: 9.1 % (ref 0–13.4)
NEUTROPHILS # BLD AUTO: 4.26 K/UL (ref 1.82–7.42)
NEUTROPHILS NFR BLD: 52.5 % (ref 44–72)
NRBC # BLD AUTO: 0 K/UL
NRBC BLD-RTO: 0 /100 WBC (ref 0–0.2)
PLATELET # BLD AUTO: 250 K/UL (ref 164–446)
PMV BLD AUTO: 9.5 FL (ref 9–12.9)
POTASSIUM SERPL-SCNC: 3.9 MMOL/L (ref 3.6–5.5)
PROT SERPL-MCNC: 6.8 G/DL (ref 6–8.2)
RBC # BLD AUTO: 4.46 M/UL (ref 4.2–5.4)
SODIUM SERPL-SCNC: 134 MMOL/L (ref 135–145)
T PALLIDUM AB SER QL IA: NORMAL
T4 FREE SERPL-MCNC: 1.26 NG/DL (ref 0.93–1.7)
TRIGL SERPL-MCNC: 154 MG/DL (ref 0–149)
TSH SERPL DL<=0.005 MIU/L-ACNC: 2.49 UIU/ML (ref 0.38–5.33)
WBC # BLD AUTO: 8.1 K/UL (ref 4.8–10.8)

## 2024-07-08 PROCEDURE — 86780 TREPONEMA PALLIDUM: CPT

## 2024-07-08 PROCEDURE — 82570 ASSAY OF URINE CREATININE: CPT

## 2024-07-08 PROCEDURE — 87591 N.GONORRHOEAE DNA AMP PROB: CPT

## 2024-07-08 PROCEDURE — 80061 LIPID PANEL: CPT

## 2024-07-08 PROCEDURE — 87389 HIV-1 AG W/HIV-1&-2 AB AG IA: CPT

## 2024-07-08 PROCEDURE — 36415 COLL VENOUS BLD VENIPUNCTURE: CPT

## 2024-07-08 PROCEDURE — 82306 VITAMIN D 25 HYDROXY: CPT

## 2024-07-08 PROCEDURE — 87491 CHLMYD TRACH DNA AMP PROBE: CPT

## 2024-07-08 PROCEDURE — 82043 UR ALBUMIN QUANTITATIVE: CPT

## 2024-07-08 PROCEDURE — 85025 COMPLETE CBC W/AUTO DIFF WBC: CPT

## 2024-07-08 PROCEDURE — 80074 ACUTE HEPATITIS PANEL: CPT

## 2024-07-08 PROCEDURE — 84443 ASSAY THYROID STIM HORMONE: CPT

## 2024-07-08 PROCEDURE — 80053 COMPREHEN METABOLIC PANEL: CPT

## 2024-07-08 PROCEDURE — 86480 TB TEST CELL IMMUN MEASURE: CPT

## 2024-07-08 PROCEDURE — 84439 ASSAY OF FREE THYROXINE: CPT

## 2024-07-08 PROCEDURE — 83036 HEMOGLOBIN GLYCOSYLATED A1C: CPT

## 2024-07-09 LAB
C TRACH DNA SPEC QL NAA+PROBE: NEGATIVE
CREAT UR-MCNC: 85.68 MG/DL
GAMMA INTERFERON BACKGROUND BLD IA-ACNC: 0.03 IU/ML
M TB IFN-G BLD-IMP: NEGATIVE
M TB IFN-G CD4+ BCKGRND COR BLD-ACNC: 0 IU/ML
MICROALBUMIN UR-MCNC: <1.2 MG/DL
MICROALBUMIN/CREAT UR: NORMAL MG/G (ref 0–30)
MITOGEN IGNF BCKGRD COR BLD-ACNC: >10 IU/ML
N GONORRHOEA DNA SPEC QL NAA+PROBE: NEGATIVE
QFT TB2 - NIL TBQ2: 0 IU/ML
SPECIMEN SOURCE: NORMAL

## 2024-09-20 DIAGNOSIS — E03.8 HYPOTHYROIDISM DUE TO HASHIMOTO'S THYROIDITIS: ICD-10-CM

## 2024-09-20 DIAGNOSIS — E06.3 HYPOTHYROIDISM DUE TO HASHIMOTO'S THYROIDITIS: ICD-10-CM

## 2024-09-23 NOTE — TELEPHONE ENCOUNTER
Received request via: Pharmacy    Was the patient seen in the last year in this department? Yes    Does the patient have an active prescription (recently filled or refills available) for medication(s) requested? No    [facility]     Does the patient have [facility] and need 100-day supply? (This applies to ALL medications) Patient does not have SCP

## 2024-09-24 RX ORDER — LEVOTHYROXINE SODIUM 175 UG/1
175 TABLET ORAL
Qty: 60 TABLET | Refills: 0 | Status: SHIPPED | OUTPATIENT
Start: 2024-09-24

## 2024-11-16 DIAGNOSIS — E06.3 HYPOTHYROIDISM DUE TO HASHIMOTO'S THYROIDITIS: ICD-10-CM

## 2024-11-19 RX ORDER — LEVOTHYROXINE SODIUM 175 UG/1
175 TABLET ORAL
Qty: 30 TABLET | Refills: 1 | Status: SHIPPED | OUTPATIENT
Start: 2024-11-19

## 2024-12-06 DIAGNOSIS — E11.65 UNCONTROLLED TYPE 2 DIABETES MELLITUS WITH HYPERGLYCEMIA (HCC): ICD-10-CM

## 2024-12-06 RX ORDER — INSULIN LISPRO 100 [IU]/ML
INJECTION, SOLUTION INTRAVENOUS; SUBCUTANEOUS
Qty: 2 EACH | Refills: 0 | Status: SHIPPED | OUTPATIENT
Start: 2024-12-06

## 2024-12-13 DIAGNOSIS — E06.3 HYPOTHYROIDISM DUE TO HASHIMOTO'S THYROIDITIS: ICD-10-CM

## 2024-12-13 RX ORDER — LEVOTHYROXINE SODIUM 175 UG/1
175 TABLET ORAL
Qty: 30 TABLET | Refills: 0 | Status: SHIPPED | OUTPATIENT
Start: 2024-12-13

## 2024-12-13 NOTE — TELEPHONE ENCOUNTER
Received request via: Pharmacy    Was the patient seen in the last year in this department? Yes    Does the patient have an active prescription (recently filled or refills available) for medication(s) requested? No    [facility]    Does the patient have [facility] and need 100-day supply? (This applies to ALL medications) Patient does not have SCP   REQUEST FOR 90 DAYS PRESCRIPTION. DX Code Needed.

## 2024-12-20 ENCOUNTER — OFFICE VISIT (OUTPATIENT)
Dept: URGENT CARE | Facility: PHYSICIAN GROUP | Age: 49
End: 2024-12-20
Payer: COMMERCIAL

## 2024-12-20 ENCOUNTER — APPOINTMENT (OUTPATIENT)
Dept: RADIOLOGY | Facility: IMAGING CENTER | Age: 49
End: 2024-12-20
Attending: NURSE PRACTITIONER
Payer: COMMERCIAL

## 2024-12-20 VITALS
OXYGEN SATURATION: 97 % | DIASTOLIC BLOOD PRESSURE: 72 MMHG | RESPIRATION RATE: 16 BRPM | HEART RATE: 70 BPM | BODY MASS INDEX: 24.91 KG/M2 | HEIGHT: 66 IN | WEIGHT: 155 LBS | TEMPERATURE: 98.6 F | SYSTOLIC BLOOD PRESSURE: 116 MMHG

## 2024-12-20 DIAGNOSIS — S76.011A HIP STRAIN, RIGHT, INITIAL ENCOUNTER: ICD-10-CM

## 2024-12-20 PROCEDURE — 73501 X-RAY EXAM HIP UNI 1 VIEW: CPT | Mod: TC,RT | Performed by: NURSE PRACTITIONER

## 2024-12-20 PROCEDURE — 3074F SYST BP LT 130 MM HG: CPT | Performed by: NURSE PRACTITIONER

## 2024-12-20 PROCEDURE — 99213 OFFICE O/P EST LOW 20 MIN: CPT | Performed by: NURSE PRACTITIONER

## 2024-12-20 PROCEDURE — 3078F DIAST BP <80 MM HG: CPT | Performed by: NURSE PRACTITIONER

## 2024-12-20 ASSESSMENT — FIBROSIS 4 INDEX: FIB4 SCORE: 0.6

## 2024-12-20 NOTE — PROGRESS NOTES
Verbal consent was acquired by the patient to use [employer] ambient listening note generation during this visit     Date: 12/20/24        Chief Complaint   Patient presents with    Hip Pain     Right, pt states was having sex, was on top for a long time x this morning           History of Present Illness  The patient is a 49-year-old female who presents for evaluation of right hip pain.    She reports experiencing severe pain in her right hip, which she describes as a deep, non-constant ache that occasionally manifests as a sharp, stabbing sensation. This happened this morning during sexual activity. The pain is localized to the joint and radiates to her buttock. She rates the baseline pain as a 3 on a scale of 10, but during episodes of sharp pain, it escalates to an 8 or 9. These episodes typically last for 1 to 2 minutes. She has a history of intermittent hip discomfort, characterized by muscle tightness, but the current pain is distinct and more intense. She is able to ambulate without difficulty and reports no history of dislocation or fracture. She is uncertain if the pain is related to her sleeping position, as she tends to sleep on the affected side. She has attempted to alleviate the pain through repositioning, but this has not been effective. She also finds it painful to cross her legs. Despite taking 800 mg of ibuprofen this morning, she experienced no relief.    MEDICATIONS  Ibuprofen.         ROS:    No severe shortness of breath   No cardiac like chest pain, as discussed   As otherwise stated in HPI    Medical/SX/ Social History:  Reviewed per chart    Pertinent Medications:    Current Outpatient Medications on File Prior to Visit   Medication Sig Dispense Refill    levothyroxine (SYNTHROID) 175 MCG Tab TAKE 1 TABLET BY MOUTH EVERY DAY IN THE MORNING ON AN EMPTY STOMACH 30 Tablet 0    insulin lispro (HUMALOG KWIKPEN) 100 UNIT/ML Solution Pen-injector injection PEN INJECT 10 UNITS UNDER THE SKIN 4  TIMES A DAY,BEFORE MEALS AND AT BEDTIME. 2 Each 0    VITAMIN D PO Take  by mouth.      EPINEPHrine (EPIPEN) 0.3 MG/0.3ML Solution Auto-injector solution for injection INJECT 0.3 ML INTO THE SHOULDER, THIGH, OR BUTTOCKS ONE TIME FOR 1 DOSE.      Ferrous Sulfate (IRON) 325 (65 Fe) MG Tab Take 325 mg by mouth 2 times a day.       No current facility-administered medications on file prior to visit.        Allergies:    Bee venom, Codeine, Other drug, Penicillins, Tree nuts food allergy, Azithromycin, Bee, Clindamycin, Influenza vaccines, Ivp [iodine], Other food, and Tape     Problem list, medications, and allergies reviewed by myself today in Epic     Physical Exam:    Vitals:    12/20/24 1241   BP: 116/72   Pulse: 70   Resp: 16   Temp: 37 °C (98.6 °F)   SpO2: 97%             Physical Exam  Vitals and nursing note reviewed.   Constitutional:       General: She is not in acute distress.     Appearance: Normal appearance. She is not ill-appearing or toxic-appearing.   HENT:      Head: Normocephalic and atraumatic.      Nose: Nose normal.      Mouth/Throat:      Mouth: Mucous membranes are moist.      Pharynx: Oropharynx is clear.   Eyes:      Extraocular Movements: Extraocular movements intact.      Conjunctiva/sclera: Conjunctivae normal.      Pupils: Pupils are equal, round, and reactive to light.   Cardiovascular:      Rate and Rhythm: Normal rate.      Pulses: Normal pulses.   Pulmonary:      Effort: Pulmonary effort is normal.   Musculoskeletal:         General: Normal range of motion.      Cervical back: Normal range of motion.      Right hip: Tenderness and bony tenderness present. No deformity, lacerations or crepitus. Normal range of motion. Normal strength.        Legs:       Comments: There is tenderness to palpation over the right hip with radiation into the right low back/buttocks.  ROM is full.  Gait is antalgic due to pain.    Skin:     General: Skin is warm.      Capillary Refill: Capillary refill takes  less than 2 seconds.   Neurological:      General: No focal deficit present.      Mental Status: She is alert and oriented to person, place, and time.          Diagnostics:      RADIOLOGY RESULTS   DX-HIP-UNILATERAL-WITHOUT PELVIS-1 VIEW RIGHT    Result Date: 12/20/2024 12/20/2024 12:55 PM HISTORY/REASON FOR EXAM:  Pain following trauma. TECHNIQUE/EXAM DESCRIPTION AND NUMBER OF VIEWS:  2 views of the RIGHT hip. COMPARISON: None FINDINGS: BONE MINERALIZATION: Normal. JOINTS: Mild right hip osteoarthrosis. No erosions. FRACTURE: None. DISLOCATION: None. SOFT TISSUES: No mass.     Mild right hip osteoarthrosis. No fracture or dislocation.            Medical Decision making and plan :  I personally reviewed prior external notes and test results pertinent to today's visit. Pt is clinically stable at today's acute urgent care visit.  Patient appears nontoxic with no acute distress noted. Appropriate for outpatient care at this time.    Pleasant 49 y.o. female presented clinic with:     Assessment & Plan  1. Right hip strain, acute.   The patient reports significant pain in the right hip, described as a deep, sometimes sharp, stabbing pain that radiates to the bottom. The pain is not constant but can reach an intensity of 8 or 9 out of 10 during episodes. She took 800 mg of ibuprofen this morning, which did not provide relief. An x-ray of the right hip is negative for fracture or dislocation, but does reveal mild arthritis.  She is advised to rest, perform gentle stretching exercises, and apply ice to the affected area. Anti-inflammatory medications are recommended, and a Toradol injection is offered for immediate pain relief. Patient declines Toradol or any antiinflammatories. She also declines diclofenac gel or referral to sports medicine or ortho. 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  She will follow up with PCP for any further worsening.       Shared decision-making was utilized with patient  for treatment plan. Medication discussed included indication for use and the potential benefits and side effects. Education was provided regarding the aforementioned assessments.  Differential Diagnosis, natural history, and supportive care discussed. All of the patient's questions were answered to their satisfaction at the time of discharge. Patient was encouraged to monitor symptoms closely. Those signs and symptoms which would warrant concern and mandate seeking a higher level of service through the emergency department discussed at length.  Patient stated agreement and understanding of this plan of care.    Disposition:  Home in stable condition     Voice Recognition Disclaimer:  Portions of this document were created using voice recognition software and [employer] technology provided by [employer]. The software does have a chance of producing errors of grammar and possibly content. I have made every reasonable attempt to correct obvious errors, but there may be errors of grammar and possibly content that I did not discover before finalizing the  documentation.    KALEN Reddy.

## 2025-02-05 ENCOUNTER — HOSPITAL ENCOUNTER (OUTPATIENT)
Dept: RADIOLOGY | Facility: MEDICAL CENTER | Age: 50
End: 2025-02-05
Attending: NURSE PRACTITIONER
Payer: COMMERCIAL

## 2025-02-05 DIAGNOSIS — Z12.31 SCREENING MAMMOGRAM, ENCOUNTER FOR: ICD-10-CM

## 2025-02-05 PROCEDURE — 77067 SCR MAMMO BI INCL CAD: CPT

## 2025-03-04 ENCOUNTER — HOSPITAL ENCOUNTER (OUTPATIENT)
Dept: LAB | Facility: MEDICAL CENTER | Age: 50
End: 2025-03-04
Attending: NURSE PRACTITIONER
Payer: COMMERCIAL

## 2025-03-04 LAB
25(OH)D3 SERPL-MCNC: 19 NG/ML (ref 30–100)
ALBUMIN SERPL BCP-MCNC: 4 G/DL (ref 3.2–4.9)
ALBUMIN/GLOB SERPL: 1.3 G/DL
ALP SERPL-CCNC: 46 U/L (ref 30–99)
ALT SERPL-CCNC: 15 U/L (ref 2–50)
ANION GAP SERPL CALC-SCNC: 11 MMOL/L (ref 7–16)
AST SERPL-CCNC: 16 U/L (ref 12–45)
BASOPHILS # BLD AUTO: 0.5 % (ref 0–1.8)
BASOPHILS # BLD: 0.03 K/UL (ref 0–0.12)
BILIRUB SERPL-MCNC: 0.4 MG/DL (ref 0.1–1.5)
BUN SERPL-MCNC: 13 MG/DL (ref 8–22)
CALCIUM ALBUM COR SERPL-MCNC: 9.2 MG/DL (ref 8.5–10.5)
CALCIUM SERPL-MCNC: 9.2 MG/DL (ref 8.5–10.5)
CHLORIDE SERPL-SCNC: 103 MMOL/L (ref 96–112)
CHOLEST SERPL-MCNC: 205 MG/DL (ref 100–199)
CO2 SERPL-SCNC: 22 MMOL/L (ref 20–33)
CREAT SERPL-MCNC: 0.79 MG/DL (ref 0.5–1.4)
CREAT UR-MCNC: 177 MG/DL
EOSINOPHIL # BLD AUTO: 0.21 K/UL (ref 0–0.51)
EOSINOPHIL NFR BLD: 3.6 % (ref 0–6.9)
ERYTHROCYTE [DISTWIDTH] IN BLOOD BY AUTOMATED COUNT: 49.5 FL (ref 35.9–50)
EST. AVERAGE GLUCOSE BLD GHB EST-MCNC: 252 MG/DL
FSH SERPL-ACNC: 6.6 MIU/ML
GFR SERPLBLD CREATININE-BSD FMLA CKD-EPI: 92 ML/MIN/1.73 M 2
GLOBULIN SER CALC-MCNC: 3 G/DL (ref 1.9–3.5)
GLUCOSE SERPL-MCNC: 246 MG/DL (ref 65–99)
HBA1C MFR BLD: 10.4 % (ref 4–5.6)
HCT VFR BLD AUTO: 41.8 % (ref 37–47)
HDLC SERPL-MCNC: 53 MG/DL
HGB BLD-MCNC: 13.8 G/DL (ref 12–16)
IMM GRANULOCYTES # BLD AUTO: 0.01 K/UL (ref 0–0.11)
IMM GRANULOCYTES NFR BLD AUTO: 0.2 % (ref 0–0.9)
LDLC SERPL CALC-MCNC: 126 MG/DL
LH SERPL-ACNC: 2.3 IU/L
LYMPHOCYTES # BLD AUTO: 1.88 K/UL (ref 1–4.8)
LYMPHOCYTES NFR BLD: 32.1 % (ref 22–41)
MCH RBC QN AUTO: 30.6 PG (ref 27–33)
MCHC RBC AUTO-ENTMCNC: 33 G/DL (ref 32.2–35.5)
MCV RBC AUTO: 92.7 FL (ref 81.4–97.8)
MICROALBUMIN UR-MCNC: 1.6 MG/DL
MICROALBUMIN/CREAT UR: 9 MG/G (ref 0–30)
MONOCYTES # BLD AUTO: 0.54 K/UL (ref 0–0.85)
MONOCYTES NFR BLD AUTO: 9.2 % (ref 0–13.4)
NEUTROPHILS # BLD AUTO: 3.19 K/UL (ref 1.82–7.42)
NEUTROPHILS NFR BLD: 54.4 % (ref 44–72)
NRBC # BLD AUTO: 0 K/UL
NRBC BLD-RTO: 0 /100 WBC (ref 0–0.2)
PLATELET # BLD AUTO: 275 K/UL (ref 164–446)
PMV BLD AUTO: 9.1 FL (ref 9–12.9)
POTASSIUM SERPL-SCNC: 4.1 MMOL/L (ref 3.6–5.5)
PROT SERPL-MCNC: 7 G/DL (ref 6–8.2)
RBC # BLD AUTO: 4.51 M/UL (ref 4.2–5.4)
SODIUM SERPL-SCNC: 136 MMOL/L (ref 135–145)
T4 FREE SERPL-MCNC: 0.75 NG/DL (ref 0.93–1.7)
TRIGL SERPL-MCNC: 131 MG/DL (ref 0–149)
TSH SERPL-ACNC: 13.6 UIU/ML (ref 0.35–5.5)
WBC # BLD AUTO: 5.9 K/UL (ref 4.8–10.8)

## 2025-03-04 PROCEDURE — 84443 ASSAY THYROID STIM HORMONE: CPT

## 2025-03-04 PROCEDURE — 83036 HEMOGLOBIN GLYCOSYLATED A1C: CPT

## 2025-03-04 PROCEDURE — 84439 ASSAY OF FREE THYROXINE: CPT

## 2025-03-04 PROCEDURE — 80061 LIPID PANEL: CPT

## 2025-03-04 PROCEDURE — 83001 ASSAY OF GONADOTROPIN (FSH): CPT

## 2025-03-04 PROCEDURE — 82671 ASSAY OF ESTROGENS: CPT

## 2025-03-04 PROCEDURE — 83002 ASSAY OF GONADOTROPIN (LH): CPT

## 2025-03-04 PROCEDURE — 82570 ASSAY OF URINE CREATININE: CPT

## 2025-03-04 PROCEDURE — 36415 COLL VENOUS BLD VENIPUNCTURE: CPT

## 2025-03-04 PROCEDURE — 85025 COMPLETE CBC W/AUTO DIFF WBC: CPT

## 2025-03-04 PROCEDURE — 80053 COMPREHEN METABOLIC PANEL: CPT

## 2025-03-04 PROCEDURE — 82043 UR ALBUMIN QUANTITATIVE: CPT

## 2025-03-04 PROCEDURE — 82306 VITAMIN D 25 HYDROXY: CPT

## 2025-03-07 LAB
ESTRADIOL SERPL HS-MCNC: 85.5 PG/ML
ESTROGEN SERPL CALC-MCNC: 127.1 PG/ML
ESTRONE SERPL-MCNC: 41.6 PG/ML

## 2025-03-24 ENCOUNTER — OFFICE VISIT (OUTPATIENT)
Dept: URGENT CARE | Facility: PHYSICIAN GROUP | Age: 50
End: 2025-03-24
Payer: COMMERCIAL

## 2025-03-24 ENCOUNTER — RESULTS FOLLOW-UP (OUTPATIENT)
Dept: URGENT CARE | Facility: PHYSICIAN GROUP | Age: 50
End: 2025-03-24

## 2025-03-24 VITALS
SYSTOLIC BLOOD PRESSURE: 90 MMHG | RESPIRATION RATE: 18 BRPM | OXYGEN SATURATION: 96 % | HEIGHT: 66 IN | DIASTOLIC BLOOD PRESSURE: 60 MMHG | HEART RATE: 75 BPM | TEMPERATURE: 98.4 F | WEIGHT: 157 LBS | BODY MASS INDEX: 25.23 KG/M2

## 2025-03-24 DIAGNOSIS — J10.1 INFLUENZA A: ICD-10-CM

## 2025-03-24 DIAGNOSIS — E10.9 TYPE 1 DIABETES MELLITUS WITHOUT COMPLICATION (HCC): ICD-10-CM

## 2025-03-24 DIAGNOSIS — J06.9 UPPER RESPIRATORY INFECTION, ACUTE: ICD-10-CM

## 2025-03-24 LAB
FLUAV RNA SPEC QL NAA+PROBE: POSITIVE
FLUBV RNA SPEC QL NAA+PROBE: NEGATIVE
RSV RNA SPEC QL NAA+PROBE: NEGATIVE
SARS-COV-2 RNA RESP QL NAA+PROBE: NEGATIVE

## 2025-03-24 PROCEDURE — 3074F SYST BP LT 130 MM HG: CPT

## 2025-03-24 PROCEDURE — 99214 OFFICE O/P EST MOD 30 MIN: CPT

## 2025-03-24 PROCEDURE — 3078F DIAST BP <80 MM HG: CPT

## 2025-03-24 PROCEDURE — 0241U POCT CEPHEID COV-2, FLU A/B, RSV - PCR: CPT

## 2025-03-24 RX ORDER — OSELTAMIVIR PHOSPHATE 75 MG/1
75 CAPSULE ORAL 2 TIMES DAILY
Qty: 10 CAPSULE | Refills: 0 | Status: SHIPPED | OUTPATIENT
Start: 2025-03-24

## 2025-03-24 RX ORDER — BENZONATATE 100 MG/1
100 CAPSULE ORAL 3 TIMES DAILY PRN
Qty: 30 CAPSULE | Refills: 0 | Status: SHIPPED | OUTPATIENT
Start: 2025-03-24

## 2025-03-24 RX ORDER — FLUTICASONE PROPIONATE 50 MCG
1 SPRAY, SUSPENSION (ML) NASAL DAILY
Qty: 16 G | Refills: 0 | Status: SHIPPED | OUTPATIENT
Start: 2025-03-24

## 2025-03-24 ASSESSMENT — ENCOUNTER SYMPTOMS
SHORTNESS OF BREATH: 0
ABDOMINAL PAIN: 0
CHILLS: 1
COUGH: 1
SORE THROAT: 0
VOMITING: 0
FEVER: 0
NAUSEA: 0

## 2025-03-24 ASSESSMENT — FIBROSIS 4 INDEX: FIB4 SCORE: 0.72

## 2025-03-24 NOTE — LETTER
[facility] [place]  [address]  [place] [address]     March 24, 2025    Patient: Aleena Diana   YOB: 1975   Date of Visit: 3/24/2025       To Whom It May Concern:    Aleena Diana was seen and treated in our department on 3/24/2025. Please excuse from any missed work.      Sincerely,     KALEN Robbins.

## 2025-03-24 NOTE — PROGRESS NOTES
Chief Complaint   Patient presents with    Cold Symptoms     Dry cough, body aches, fever, X2 days        HISTORY OF PRESENT ILLNESS: Patient is a pleasant 50 y.o. female who presents to urgent care today like symptoms for the last 2 days.  Patient took an at home flu and COVID test which was negative, she is requesting a retest.  She denies any major shortness of breath, no noted temp fevers.    Patient Active Problem List    Diagnosis Date Noted    Lump in armpit, right 01/04/2024    Recent URI 01/04/2024    Menorrhagia, premenopausal 09/21/2023    Hyperlipidemia 09/21/2023    Generalized abdominal pain 09/05/2023    Tuberculosis screening 05/12/2023    Leukemoid reaction 04/21/2023    Routine screening for STI (sexually transmitted infection) 03/15/2023    Dysuria 09/07/2022    Acute pain of left knee 09/07/2022    Hypertensive crisis 05/26/2022    Skin tag 05/27/2021    Excessive sexual drive 04/06/2021    Encounter for surveillance of contraceptive pills 04/06/2021    Vaginal yeast infection 04/06/2021    Encounter for long-term methadone use 04/24/2020    Encounter for long-term (current) use of insulin (HCC) 01/17/2020    Hyperglycemia 01/17/2020    Hypothyroidism 01/17/2020    Type 1 diabetes mellitus without complication (HCC) 01/17/2020    Skin lesion 11/06/2018    Right hand pain 04/09/2018    Plantar fasciitis 04/09/2018    Fatty liver 04/21/2017    Elevated LFTs 04/20/2017    Tonsillith 04/19/2017    Vitamin D deficiency 01/20/2017    IUD (intrauterine device) in place 01/17/2017    Chronic fatigue 01/17/2017    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    Hypothyroidism due to Hashimoto's thyroiditis 10/06/2016    Obesity with body mass index 30 or greater 10/06/2016    Seasonal allergic rhinitis 10/06/2016    Erythema of lower extremity 10/06/2016    Mild intermittent asthma without complication 10/06/2016       Allergies:Bee venom, Codeine, Other drug, Penicillins, Tree nuts food allergy,  Azithromycin, Bee, Clindamycin, Influenza vaccines, Ivp [iodine], Other food, and Tape    Current Outpatient Medications Ordered in Epic   Medication Sig Dispense Refill    oseltamivir (TAMIFLU) 75 MG Cap Take 1 Capsule by mouth 2 times a day. 10 Capsule 0    levothyroxine (SYNTHROID) 175 MCG Tab TAKE 1 TABLET BY MOUTH EVERY DAY IN THE MORNING ON AN EMPTY STOMACH 30 Tablet 0    insulin lispro (HUMALOG KWIKPEN) 100 UNIT/ML Solution Pen-injector injection PEN INJECT 10 UNITS UNDER THE SKIN 4 TIMES A DAY,BEFORE MEALS AND AT BEDTIME. 2 Each 0    VITAMIN D PO Take  by mouth.      EPINEPHrine (EPIPEN) 0.3 MG/0.3ML Solution Auto-injector solution for injection INJECT 0.3 ML INTO THE SHOULDER, THIGH, OR BUTTOCKS ONE TIME FOR 1 DOSE.      fluticasone (FLONASE) 50 MCG/ACT nasal spray Administer 1 Spray into affected nostril(S) every day. (Patient not taking: Reported on 3/24/2025) 16 g 0    benzonatate (TESSALON) 100 MG Cap Take 1 Capsule by mouth 3 times a day as needed for Cough. (Patient not taking: Reported on 3/24/2025) 30 Capsule 0    Ferrous Sulfate (IRON) 325 (65 Fe) MG Tab Take 325 mg by mouth 2 times a day.       No current [facility]-ordered facility-administered medications on file.       Past Medical History:   Diagnosis Date    Anxiety     Diabetes (HCC)     Palpitations     Thyroid disease        Social History     Tobacco Use    Smoking status: Every Day     Current packs/day: 0.00     Average packs/day: 1 pack/day for 20.0 years (20.0 ttl pk-yrs)     Types: Cigarettes     Start date: 1993     Last attempt to quit: 2013     Years since quittin.8    Smokeless tobacco: Never   Vaping Use    Vaping status: Never Used   Substance Use Topics    Alcohol use: Yes     Alcohol/week: 0.0 oz     Comment: occasional    Drug use: Yes     Types: Marijuana, Oral     Comment: 1x nightly       Family Status   Relation Name Status    Mo Fanta Alive    Fa Asif     [employer]GF[facility] [place] (Not Specified)    Neg Hx  (Not  "Specified)   No partnership data on file     Family History   Problem Relation Age of Onset    Hypertension Mother     Hyperlipidemia Mother     Cancer Father     Hypertension Father     Cancer Maternal Grandfather         lung    Diabetes Neg Hx     Heart Disease Neg Hx     Stroke Neg Hx        Review of Systems   Constitutional:  Positive for chills and malaise/fatigue. Negative for fever.   HENT:  Positive for congestion. Negative for ear pain and sore throat.    Respiratory:  Positive for cough. Negative for shortness of breath.    Gastrointestinal:  Negative for abdominal pain, nausea and vomiting.   Skin:  Negative for rash.       Exam:  BP 90/60 (BP Location: Left arm, Patient Position: Sitting, BP Cuff Size: Adult)   Pulse 75   Temp 36.9 °C (98.4 °F) (Temporal)   Resp 18   Ht 1.676 m (5' 6\")   Wt 71.2 kg (157 lb)   SpO2 96%   Physical Exam  Vitals reviewed.   Constitutional:       General: She is not in acute distress.     Appearance: Normal appearance. She is normal weight.   HENT:      Head: Normocephalic.      Right Ear: Tympanic membrane, ear canal and external ear normal.      Left Ear: Tympanic membrane, ear canal and external ear normal.      Nose: Congestion present. No rhinorrhea.      Mouth/Throat:      Mouth: Mucous membranes are moist.      Pharynx: Oropharynx is clear. No oropharyngeal exudate or posterior oropharyngeal erythema.   Eyes:      General:         Right eye: No discharge.         Left eye: No discharge.      Extraocular Movements: Extraocular movements intact.      Conjunctiva/sclera: Conjunctivae normal.      Pupils: Pupils are equal, round, and reactive to light.   Cardiovascular:      Rate and Rhythm: Normal rate and regular rhythm.      Pulses: Normal pulses.      Heart sounds: Normal heart sounds.   Pulmonary:      Effort: Pulmonary effort is normal. No respiratory distress.      Breath sounds: Normal breath sounds.      Comments: Positive dry cough  Musculoskeletal:       "   General: Normal range of motion.      Cervical back: Normal range of motion.   Skin:     General: Skin is warm and dry.   Neurological:      General: No focal deficit present.      Mental Status: She is alert.   Psychiatric:         Mood and Affect: Mood normal.           Assessment/Plan:  1. Influenza A  - oseltamivir (TAMIFLU) 75 MG Cap; Take 1 Capsule by mouth 2 times a day.  Dispense: 10 Capsule; Refill: 0    2. Upper respiratory infection, acute  - fluticasone (FLONASE) 50 MCG/ACT nasal spray; Administer 1 Spray into affected nostril(S) every day. (Patient not taking: Reported on 3/24/2025)  Dispense: 16 g; Refill: 0  - benzonatate (TESSALON) 100 MG Cap; Take 1 Capsule by mouth 3 times a day as needed for Cough. (Patient not taking: Reported on 3/24/2025)  Dispense: 30 Capsule; Refill: 0  - POCT CoV-2, Flu A/B, RSV by PCR    3. Type 1 diabetes mellitus without complication (HCC)    Based on patient's physical presentation along with review of systems I do think they likely have a viral illness.  Viral swab complete.  Vitals are within normal limits, lung sounds are clear to auscultation.  I did go ahead and place patient on comfort measures. Advised patient to drink plenty of fluids, take Motrin and Tylenol as needed, vitamin C, D.  Patient is aware of the plan of care and agreeable at this time, encouraged them to follow-up if they continue to get worse or do not improve.    Patient does have type 1 diabetes, states their glucose has been controlled, no major symptoms associated, I did advise the risks of elevated glucose while not feeling well.  Patient encouraged to drink more fluids while sick and check glucose more often. Total time spent with the patient 35 minutes to include, review of chart, charting, assessment, procedure.     Supportive care, differential diagnoses, and indications for immediate follow-up discussed with patient.   Pathogenesis of diagnosis discussed including typical length and  natural progression.   Instructed to return to clinic or nearest emergency department for any change in condition, further concerns, or worsening of symptoms.  Patient states understanding of the plan of care and discharge instructions.  Instructed to make an appointment, for follow up, with primary care provider.    Please note that this dictation was created using voice recognition software. I have made every reasonable attempt to correct obvious errors, but I expect that there are errors of grammar and possibly content that I did not discover before finalizing the note.      Lydia Briceno APRN

## 2025-04-15 SDOH — HEALTH STABILITY: MENTAL HEALTH
STRESS IS WHEN SOMEONE FEELS TENSE, NERVOUS, ANXIOUS, OR CAN'T SLEEP AT NIGHT BECAUSE THEIR MIND IS TROUBLED. HOW STRESSED ARE YOU?: ONLY A LITTLE

## 2025-04-15 SDOH — ECONOMIC STABILITY: INCOME INSECURITY: IN THE LAST 12 MONTHS, WAS THERE A TIME WHEN YOU WERE NOT ABLE TO PAY THE MORTGAGE OR RENT ON TIME?: NO

## 2025-04-15 SDOH — HEALTH STABILITY: PHYSICAL HEALTH: ON AVERAGE, HOW MANY MINUTES DO YOU ENGAGE IN EXERCISE AT THIS LEVEL?: 60 MIN

## 2025-04-15 SDOH — ECONOMIC STABILITY: FOOD INSECURITY: WITHIN THE PAST 12 MONTHS, THE FOOD YOU BOUGHT JUST DIDN'T LAST AND YOU DIDN'T HAVE MONEY TO GET MORE.: NEVER TRUE

## 2025-04-15 SDOH — HEALTH STABILITY: PHYSICAL HEALTH: ON AVERAGE, HOW MANY DAYS PER WEEK DO YOU ENGAGE IN MODERATE TO STRENUOUS EXERCISE (LIKE A BRISK WALK)?: 6 DAYS

## 2025-04-15 SDOH — ECONOMIC STABILITY: FOOD INSECURITY: WITHIN THE PAST 12 MONTHS, YOU WORRIED THAT YOUR FOOD WOULD RUN OUT BEFORE YOU GOT MONEY TO BUY MORE.: NEVER TRUE

## 2025-04-15 SDOH — ECONOMIC STABILITY: INCOME INSECURITY: HOW HARD IS IT FOR YOU TO PAY FOR THE VERY BASICS LIKE FOOD, HOUSING, MEDICAL CARE, AND HEATING?: NOT HARD AT ALL

## 2025-04-15 ASSESSMENT — SOCIAL DETERMINANTS OF HEALTH (SDOH)
HOW OFTEN DO YOU ATTEND CHURCH OR RELIGIOUS SERVICES?: NEVER
HOW MANY DRINKS CONTAINING ALCOHOL DO YOU HAVE ON A TYPICAL DAY WHEN YOU ARE DRINKING: 1 OR 2
HOW OFTEN DO YOU HAVE SIX OR MORE DRINKS ON ONE OCCASION: NEVER
HOW OFTEN DO YOU ATTENT MEETINGS OF THE CLUB OR ORGANIZATION YOU BELONG TO?: NEVER
HOW OFTEN DO YOU GET TOGETHER WITH FRIENDS OR RELATIVES?: TWICE A WEEK
WITHIN THE PAST 12 MONTHS, YOU WORRIED THAT YOUR FOOD WOULD RUN OUT BEFORE YOU GOT THE MONEY TO BUY MORE: NEVER TRUE
HOW HARD IS IT FOR YOU TO PAY FOR THE VERY BASICS LIKE FOOD, HOUSING, MEDICAL CARE, AND HEATING?: NOT HARD AT ALL
HOW OFTEN DO YOU ATTEND CHURCH OR RELIGIOUS SERVICES?: NEVER
HOW OFTEN DO YOU HAVE A DRINK CONTAINING ALCOHOL: MONTHLY OR LESS
HOW OFTEN DO YOU GET TOGETHER WITH FRIENDS OR RELATIVES?: TWICE A WEEK
IN A TYPICAL WEEK, HOW MANY TIMES DO YOU TALK ON THE PHONE WITH FAMILY, FRIENDS, OR NEIGHBORS?: MORE THAN THREE TIMES A WEEK
IN A TYPICAL WEEK, HOW MANY TIMES DO YOU TALK ON THE PHONE WITH FAMILY, FRIENDS, OR NEIGHBORS?: MORE THAN THREE TIMES A WEEK
DO YOU BELONG TO ANY CLUBS OR ORGANIZATIONS SUCH AS CHURCH GROUPS UNIONS, FRATERNAL OR ATHLETIC GROUPS, OR SCHOOL GROUPS?: NO
HOW OFTEN DO YOU ATTENT MEETINGS OF THE CLUB OR ORGANIZATION YOU BELONG TO?: NEVER
IN THE PAST 12 MONTHS, HAS THE ELECTRIC, GAS, OIL, OR WATER COMPANY THREATENED TO SHUT OFF SERVICE IN YOUR HOME?: NO
DO YOU BELONG TO ANY CLUBS OR ORGANIZATIONS SUCH AS CHURCH GROUPS UNIONS, FRATERNAL OR ATHLETIC GROUPS, OR SCHOOL GROUPS?: NO

## 2025-04-15 ASSESSMENT — LIFESTYLE VARIABLES
HOW MANY STANDARD DRINKS CONTAINING ALCOHOL DO YOU HAVE ON A TYPICAL DAY: 1 OR 2
AUDIT-C TOTAL SCORE: 1
SKIP TO QUESTIONS 9-10: 1
HOW OFTEN DO YOU HAVE A DRINK CONTAINING ALCOHOL: MONTHLY OR LESS
HOW OFTEN DO YOU HAVE SIX OR MORE DRINKS ON ONE OCCASION: NEVER

## 2025-04-16 ENCOUNTER — OFFICE VISIT (OUTPATIENT)
Dept: INTERNAL MEDICINE | Facility: OTHER | Age: 50
End: 2025-04-16
Payer: COMMERCIAL

## 2025-04-16 VITALS
SYSTOLIC BLOOD PRESSURE: 114 MMHG | WEIGHT: 158.2 LBS | HEART RATE: 80 BPM | DIASTOLIC BLOOD PRESSURE: 74 MMHG | TEMPERATURE: 98.4 F | OXYGEN SATURATION: 95 % | HEIGHT: 66 IN | BODY MASS INDEX: 25.43 KG/M2

## 2025-04-16 DIAGNOSIS — Z76.89 ENCOUNTER TO ESTABLISH CARE: ICD-10-CM

## 2025-04-16 DIAGNOSIS — E55.9 VITAMIN D DEFICIENCY: ICD-10-CM

## 2025-04-16 DIAGNOSIS — E06.3 HYPOTHYROIDISM DUE TO HASHIMOTO'S THYROIDITIS: ICD-10-CM

## 2025-04-16 DIAGNOSIS — T78.00XA ALLERGY WITH ANAPHYLAXIS DUE TO FOOD: ICD-10-CM

## 2025-04-16 DIAGNOSIS — E10.9 TYPE 1 DIABETES MELLITUS WITHOUT COMPLICATION (HCC): ICD-10-CM

## 2025-04-16 DIAGNOSIS — Z12.11 SCREENING FOR COLON CANCER: ICD-10-CM

## 2025-04-16 PROBLEM — D72.823 LEUKEMOID REACTION: Status: RESOLVED | Noted: 2023-04-21 | Resolved: 2025-04-16

## 2025-04-16 PROBLEM — B37.31 VAGINAL YEAST INFECTION: Status: RESOLVED | Noted: 2021-04-06 | Resolved: 2025-04-16

## 2025-04-16 PROBLEM — Z11.1 TUBERCULOSIS SCREENING: Status: RESOLVED | Noted: 2023-05-12 | Resolved: 2025-04-16

## 2025-04-16 PROBLEM — R53.82 CHRONIC FATIGUE: Status: RESOLVED | Noted: 2017-01-17 | Resolved: 2025-04-16

## 2025-04-16 PROBLEM — Z30.41 ENCOUNTER FOR SURVEILLANCE OF CONTRACEPTIVE PILLS: Status: RESOLVED | Noted: 2021-04-06 | Resolved: 2025-04-16

## 2025-04-16 PROBLEM — L91.8 SKIN TAG: Status: RESOLVED | Noted: 2021-05-27 | Resolved: 2025-04-16

## 2025-04-16 PROBLEM — I16.9 HYPERTENSIVE CRISIS: Status: RESOLVED | Noted: 2022-05-26 | Resolved: 2025-04-16

## 2025-04-16 PROBLEM — Z79.4 ENCOUNTER FOR LONG-TERM (CURRENT) USE OF INSULIN (HCC): Status: RESOLVED | Noted: 2020-01-17 | Resolved: 2025-04-16

## 2025-04-16 PROBLEM — Z97.5 PRESENCE OF INTRAUTERINE CONTRACEPTIVE DEVICE: Status: RESOLVED | Noted: 2017-01-17 | Resolved: 2025-04-16

## 2025-04-16 PROBLEM — R79.89 ELEVATED LFTS: Status: RESOLVED | Noted: 2017-04-20 | Resolved: 2025-04-16

## 2025-04-16 PROBLEM — J06.9 RECENT URI: Status: RESOLVED | Noted: 2024-01-04 | Resolved: 2025-04-16

## 2025-04-16 PROBLEM — E03.9 HYPOTHYROIDISM: Status: RESOLVED | Noted: 2020-01-17 | Resolved: 2025-04-16

## 2025-04-16 PROBLEM — L98.9 SKIN LESION: Status: RESOLVED | Noted: 2018-11-06 | Resolved: 2025-04-16

## 2025-04-16 PROBLEM — J35.8 TONSILLITH: Status: RESOLVED | Noted: 2017-04-19 | Resolved: 2025-04-16

## 2025-04-16 PROBLEM — R22.31 LUMP IN ARMPIT, RIGHT: Status: RESOLVED | Noted: 2024-01-04 | Resolved: 2025-04-16

## 2025-04-16 PROBLEM — Z11.3 ROUTINE SCREENING FOR STI (SEXUALLY TRANSMITTED INFECTION): Status: RESOLVED | Noted: 2023-03-15 | Resolved: 2025-04-16

## 2025-04-16 PROBLEM — R10.84 GENERALIZED ABDOMINAL PAIN: Status: RESOLVED | Noted: 2023-09-05 | Resolved: 2025-04-16

## 2025-04-16 PROBLEM — R73.9 HYPERGLYCEMIA: Status: RESOLVED | Noted: 2020-01-17 | Resolved: 2025-04-16

## 2025-04-16 PROBLEM — Z79.891 ENCOUNTER FOR LONG-TERM METHADONE USE: Status: RESOLVED | Noted: 2020-04-24 | Resolved: 2025-04-16

## 2025-04-16 PROBLEM — M72.2 PLANTAR FASCIITIS: Status: RESOLVED | Noted: 2018-04-09 | Resolved: 2025-04-16

## 2025-04-16 PROBLEM — M25.562 ACUTE PAIN OF LEFT KNEE: Status: RESOLVED | Noted: 2022-09-07 | Resolved: 2025-04-16

## 2025-04-16 PROBLEM — M79.641 RIGHT HAND PAIN: Status: RESOLVED | Noted: 2018-04-09 | Resolved: 2025-04-16

## 2025-04-16 PROBLEM — R30.0 DYSURIA: Status: RESOLVED | Noted: 2022-09-07 | Resolved: 2025-04-16

## 2025-04-16 PROCEDURE — 3074F SYST BP LT 130 MM HG: CPT | Mod: GC | Performed by: INTERNAL MEDICINE

## 2025-04-16 PROCEDURE — 3078F DIAST BP <80 MM HG: CPT | Mod: GC | Performed by: INTERNAL MEDICINE

## 2025-04-16 PROCEDURE — 99204 OFFICE O/P NEW MOD 45 MIN: CPT | Mod: GC | Performed by: INTERNAL MEDICINE

## 2025-04-16 RX ORDER — EPINEPHRINE 0.3 MG/.3ML
0.3 INJECTION SUBCUTANEOUS PRN
Qty: 1 EACH | Refills: 1 | Status: SHIPPED | OUTPATIENT
Start: 2025-04-16

## 2025-04-16 RX ORDER — ERGOCALCIFEROL 1.25 MG/1
CAPSULE, LIQUID FILLED ORAL
COMMUNITY

## 2025-04-16 RX ORDER — LEVOTHYROXINE SODIUM 200 UG/1
200 TABLET ORAL
Qty: 90 TABLET | Refills: 0 | Status: SHIPPED | OUTPATIENT
Start: 2025-04-16

## 2025-04-16 ASSESSMENT — FIBROSIS 4 INDEX: FIB4 SCORE: 0.72

## 2025-04-16 ASSESSMENT — PATIENT HEALTH QUESTIONNAIRE - PHQ9: CLINICAL INTERPRETATION OF PHQ2 SCORE: 0

## 2025-04-16 NOTE — PATIENT INSTRUCTIONS
- Keep appointment with [facility] in order to get the insulin pump. You also need feet monofilament test  - Keep appointment with ophthalmology  - Increase levothyroxine to 200 mcg daily. Remember to take it in the morning without food.  - Given that you cannot tolerate fish oil, you can try red yeast rice  - Continue with your exercise and healthy diet  - Will check your thyroid hormones for next visit in order to adjust the medication

## 2025-04-16 NOTE — PROGRESS NOTES
New Patient    Aleena Diana is a 50 y.o. 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    Subjective:  The patient reports an issue with high TSH levels despite taking 175 mcg of levothyroxine. This has been affecting her ability to manage her blood sugar levels effectively. She recalls a previous regimen that involved adjusting the dose of levothyroxine and would like to return to a similar management plan.    The patient has a history of type 1 diabetes managed with insulin on a sliding scale and carbohydrate counting. She reports her last hemoglobin A1c was 10.4, indicating poor blood sugar control. She has a glucometer. She attributes this partly to her thyroid issues and mentions an upcoming appointment with her endocrinologist to discuss the possibility of starting insulin pump therapy.    The patient has a history of allergies, including anaphylaxis to bee stings, for which she requires an EpiPen. She also reports allergies to buprenorphine, codeine, penicillins, tree nuts (almonds and pecans), salmon, trout, and rye. She experienced a rash from rye bread, confirmed by allergy testing.    She lost 80 pounds through exercise and diet, resolving previous issues like erythema of the lower extremities and fatty liver. She reports a history of vitamin D deficiency, managed with 50,000 units of vitamin D weekly. She also has a history of hyperlipidemia that fluctuates with her thyroid function.    The patient has a significant past surgical history, including cholecystectomy in 2008 and an appendectomy as a child.    Family history includes her mother with hypertension and  hyperlipidemia and her father who had liver cancer and hypertension. Paternal grandmother had bladder cancer. No family history of colon cancer.    Review of Systems:  - Endocrine: Reports fatigue and feeling cold.  - Cardiovascular: No chest pain or palpitations.  - Respiratory: No current symptoms.  - Gastrointestinal: No recent abdominal pain.  - Musculoskeletal: History of right hand pain and resolved skin lesion.  - Neurological: No neuropathy or hypoglycemia episodes in the past two years.  - Genitourinary: Reports menorrhagia.  - All other review of systems negative except for listed above.    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 She engages in physical activity six days a week, including gym workouts, biking, and hiking. She quit smoking in 2013 and drinks alcohol occasionally, preferring vodka.        Past Medical History:   Diagnosis Date    Anxiety     Diabetes (HCC) 2015    IUD (intrauterine device) in place 01/17/2017    IMO load March 2020      Lump in armpit, right 01/04/2024    Palpitations     Recent URI 01/04/2024    Right hand pain 04/09/2018    Skin lesion 11/06/2018    Thyroid disease     Tonsillith 04/19/2017     Past Surgical History:   Procedure Laterality Date    CHOLECYSTECTOMY  2008    APPENDECTOMY      PRIMARY C SECTION       Family History   Problem Relation Age of Onset    Hypertension Mother     Hyperlipidemia Mother     Cancer Father         Liver    Hypertension Father     Cancer Maternal Grandfather         lung, asbestos    Cancer Paternal Grandmother         bladder    No Known Problems Daughter     Diabetes Neg Hx     Heart Disease Neg Hx     Stroke Neg Hx      Social History     Tobacco Use    Smoking status: Former     Current packs/day: 0.00     Average packs/day: 1 pack/day for 20.0 years (20.0 ttl pk-yrs)      "Types: Cigarettes     Start date: 1993     Quit date: 2013     Years since quittin.9    Smokeless tobacco: Never   Vaping Use    Vaping status: Every Day    Substances: Nicotine   Substance Use Topics    Alcohol use: Yes     Alcohol/week: 0.6 oz     Types: 1 Shots of liquor per week     Comment: occasional    Drug use: Yes     Types: Marijuana, Oral     Comment: 1x nightly     Current Outpatient Medications   Medication Sig Dispense Refill    vitamin D2, Ergocalciferol, (DRISDOL) 1.25 MG (59134 UT) Cap capsule Take  by mouth every 7 days.      EPINEPHrine (EPIPEN) 0.3 MG/0.3ML Solution Auto-injector solution for injection Inject 0.3 mL into the shoulder, thigh, or buttocks as needed (For anaphylactic reaction, bee stings). 1 Each 1    levothyroxine (SYNTHROID) 200 MCG Tab Take 1 Tablet by mouth every morning on an empty stomach. 90 Tablet 0    levothyroxine (SYNTHROID) 175 MCG Tab TAKE 1 TABLET BY MOUTH EVERY DAY IN THE MORNING ON AN EMPTY STOMACH 30 Tablet 0    insulin lispro (HUMALOG KWIKPEN) 100 UNIT/ML Solution Pen-injector injection PEN INJECT 10 UNITS UNDER THE SKIN 4 TIMES A DAY,BEFORE MEALS AND AT BEDTIME. 2 Each 0     No current facility-administered medications for this visit.       /74 (BP Location: Left arm, Patient Position: Sitting, BP Cuff Size: Adult)   Pulse 80   Temp 36.9 °C (98.4 °F) (Temporal)   Ht 1.676 m (5' 6\")   Wt 71.8 kg (158 lb 3.2 oz)   SpO2 95%   BMI 25.53 kg/m²   Physical Exam  Constitutional:       Appearance: Normal appearance. She is normal weight.   Cardiovascular:      Rate and Rhythm: Normal rate and regular rhythm.      Pulses: Normal pulses.      Heart sounds: Normal heart sounds.   Pulmonary:      Effort: Pulmonary effort is normal.      Breath sounds: Normal breath sounds.   Abdominal:      General: Bowel sounds are normal.   Musculoskeletal:         General: Normal range of motion.      Cervical back: Normal range of motion.      Right lower leg: No " edema.      Left lower leg: No edema.   Skin:     General: Skin is warm.      Capillary Refill: Capillary refill takes less than 2 seconds.   Neurological:      General: No focal deficit present.      Mental Status: She is alert and oriented to person, place, and time. Mental status is at baseline.   Psychiatric:         Mood and Affect: Mood normal.         Thought Content: Thought content normal.         Judgment: Judgment normal.         Assessment and Plan:     1. 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 She engages in physical activity six days a week, including gym workouts, biking, and hiking. She quit smoking in 2013 and drinks alcohol occasionally, preferring vodka.    2. 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  The patient had seen an allergist just before and underwent some testing.  - Refill EpiPen    3. Type 1 diabetes mellitus without complication (HCC)  Long history of diabetes on several times in the past about being type I or type II but most recent evaluation by [facility] stated that the patient has type 1 diabetes currently on insulin Premeal and sliding scale and last hemoglobin A1c was 10.4.  Patient has tried in the past GLP-1 inhibitors, metformin and other type II diabetic medication without improvement.  - Patient does not have complications of diabetes.  - Keep appointment with endocrinologist in order to get a pump  - Patient will also need monofilament test  - Given she is diabetic and ASCVD score 1.8% recommending moderate intensity and statin this was offered to the patient but she refuses  - Recommended red yeast rice as patient cannot tolerate fish oil    4. Vitamin D deficiency  Chronic.  Last levels were 19 and the patient is taking supplementation.  - Continue with vitamin D50 1000 units weekly    5. Hypothyroidism due to Hashimoto's  thyroiditis  Chronic history of hypothyroidism, the patient has been in different several doses in the past and currently on 170 mcg daily.  Patient is complaining of fatigue and cold but she has intentionally lost 80 pounds in the past few years with exercise and diet.  - Last TSH was 13.6 and low T4 the patient is symptomatic  - Will increase levothyroxine to 200 mcg daily  - Will check TSH levels and adjust medication accordingly in 6 weeks  - TSH; Future    6. Screening for colon cancer  - Cologuard® colon cancer screening    Patient refusing all kind of vaccines and does not want to be offered again.    Orders Placed This Encounter    Cologuard® colon cancer screening    TSH    vitamin D2, Ergocalciferol, (DRISDOL) 1.25 MG (49776 UT) Cap capsule    EPINEPHrine (EPIPEN) 0.3 MG/0.3ML Solution Auto-injector solution for injection    levothyroxine (SYNTHROID) 200 MCG Tab       Return in about 6 weeks (around 5/28/2025) for Likely with another provider as I'm not going to be on clinic. Will need to manage thyroid med.    Maik Cummins MD PGY I  Internal Medicine  [facility]

## 2025-06-04 ENCOUNTER — HOSPITAL ENCOUNTER (OUTPATIENT)
Dept: LAB | Facility: MEDICAL CENTER | Age: 50
End: 2025-06-04
Attending: INTERNAL MEDICINE
Payer: COMMERCIAL

## 2025-06-04 DIAGNOSIS — E06.3 HYPOTHYROIDISM DUE TO HASHIMOTO'S THYROIDITIS: ICD-10-CM

## 2025-06-04 PROCEDURE — 36415 COLL VENOUS BLD VENIPUNCTURE: CPT

## 2025-06-04 PROCEDURE — 84443 ASSAY THYROID STIM HORMONE: CPT

## 2025-06-05 ENCOUNTER — OFFICE VISIT (OUTPATIENT)
Dept: INTERNAL MEDICINE | Facility: OTHER | Age: 50
End: 2025-06-05
Payer: COMMERCIAL

## 2025-06-05 VITALS
HEART RATE: 84 BPM | HEIGHT: 66 IN | OXYGEN SATURATION: 96 % | DIASTOLIC BLOOD PRESSURE: 63 MMHG | WEIGHT: 158.2 LBS | TEMPERATURE: 98.9 F | BODY MASS INDEX: 25.43 KG/M2 | SYSTOLIC BLOOD PRESSURE: 95 MMHG

## 2025-06-05 DIAGNOSIS — E06.3 HYPOTHYROIDISM DUE TO HASHIMOTO'S THYROIDITIS: ICD-10-CM

## 2025-06-05 DIAGNOSIS — Z11.1 SCREENING-PULMONARY TB: ICD-10-CM

## 2025-06-05 DIAGNOSIS — N95.1 HOT FLASHES DUE TO MENOPAUSE: ICD-10-CM

## 2025-06-05 DIAGNOSIS — Z11.3 SCREENING FOR STDS (SEXUALLY TRANSMITTED DISEASES): Primary | ICD-10-CM

## 2025-06-05 LAB — TSH SERPL-ACNC: 0.07 UIU/ML (ref 0.38–5.33)

## 2025-06-05 PROCEDURE — 3078F DIAST BP <80 MM HG: CPT

## 2025-06-05 PROCEDURE — 3074F SYST BP LT 130 MM HG: CPT

## 2025-06-05 PROCEDURE — 99213 OFFICE O/P EST LOW 20 MIN: CPT | Mod: GE

## 2025-06-05 RX ORDER — LEVOTHYROXINE SODIUM 175 UG/1
175 TABLET ORAL
Qty: 30 TABLET | Refills: 2 | Status: SHIPPED | OUTPATIENT
Start: 2025-06-05

## 2025-06-05 ASSESSMENT — ENCOUNTER SYMPTOMS
WEIGHT LOSS: 0
HEARTBURN: 0
HEMOPTYSIS: 0
PALPITATIONS: 0
DOUBLE VISION: 0
HEADACHES: 0
VOMITING: 0
SPUTUM PRODUCTION: 0
CHILLS: 0
DIZZINESS: 0
MYALGIAS: 0
DEPRESSION: 0
NAUSEA: 0
FEVER: 0
ABDOMINAL PAIN: 0
WHEEZING: 0
COUGH: 0
BLURRED VISION: 0
DIARRHEA: 0
SHORTNESS OF BREATH: 0
ORTHOPNEA: 0
BACK PAIN: 0

## 2025-06-05 ASSESSMENT — FIBROSIS 4 INDEX: FIB4 SCORE: 0.72

## 2025-06-05 NOTE — PROGRESS NOTES
PCP: Dr Maik Cummins MD    OFFICE VISIT    Aleena Diana is a 50 y.o. female who presents today with the following:    Reason for visit:    HPI:  Mrs Diana is a 50-year-old woman presenting to the office for an acute visit.    Patient wished to follow-up on TSH labs during this visit.  She has a history of Hashimoto's thyroiditis since the past 10 years and is compliant on levothyroxine.  TSH levels were high at 13.6 in 03/2025 when patient was compliant on levothyroxine 175 mcg.  This dose was increased to 200 mcg in the last visit.  Patient's repeat TSH is low at 0.07.  Patient states she has a chronic history of hypothyroidism and has been on several different doses in the past.  She was previously being followed by endocrinology for this and she would take levothyroxine 6 days/week and skip the dose on Sundays.  Patient states she is scheduled to see endocrinology in a month for her diabetic pump.  She denies symptoms including diarrhea/constipation, skin changes, palpitations, cold/heat intolerance, weight loss/gain.    She also reports having hot flashes since several months.  States her cycles have been irregular as she is nearing menopause, LMP 3 weeks ago.  Patient requested referral for gynecology regarding this.    She also requested a TB QuantiFERON test for work.    Patient also states that she is in a new relationship and requested to be screened for STDs.      Review of Systems   Constitutional:  Negative for chills, fever, malaise/fatigue and weight loss.   HENT:  Negative for ear discharge.    Eyes:  Negative for blurred vision and double vision.   Respiratory:  Negative for cough, hemoptysis, sputum production, shortness of breath and wheezing.    Cardiovascular:  Negative for chest pain, palpitations, orthopnea and leg swelling.   Gastrointestinal:  Negative for abdominal pain, diarrhea, heartburn, nausea and vomiting.   Genitourinary:  Negative for dysuria, frequency and  "urgency.   Musculoskeletal:  Negative for back pain, joint pain and myalgias.   Skin:  Negative for itching and rash.   Neurological:  Negative for dizziness and headaches.   Psychiatric/Behavioral:  Negative for depression.          Vitals:  BP 95/63 (BP Location: Left arm, Patient Position: Sitting, BP Cuff Size: Adult)   Pulse 84   Temp 37.2 °C (98.9 °F) (Temporal)   Ht 1.676 m (5' 6\")   Wt 71.8 kg (158 lb 3.2 oz)   SpO2 96%  Body mass index is 25.53 kg/m².    Physical exam:  Physical Exam  Vitals and nursing note reviewed.   Constitutional:       General: She is not in acute distress.     Appearance: She is not diaphoretic.   HENT:      Head: Normocephalic and atraumatic.      Mouth/Throat:      Mouth: Mucous membranes are moist.      Pharynx: Oropharynx is clear. No oropharyngeal exudate or posterior oropharyngeal erythema.   Cardiovascular:      Rate and Rhythm: Normal rate and regular rhythm.      Pulses: Normal pulses.      Heart sounds: Normal heart sounds. No murmur heard.     No gallop.   Pulmonary:      Effort: Pulmonary effort is normal. No respiratory distress.      Breath sounds: Normal breath sounds. No wheezing, rhonchi or rales.   Abdominal:      General: There is no distension.      Palpations: Abdomen is soft.      Tenderness: There is no abdominal tenderness.   Musculoskeletal:      Cervical back: Neck supple.      Right lower leg: No edema.      Left lower leg: No edema.   Lymphadenopathy:      Cervical: No cervical adenopathy.   Skin:     General: Skin is warm and dry.      Findings: No rash.   Neurological:      General: No focal deficit present.      Mental Status: She is alert and oriented to person, place, and time.   Psychiatric:         Mood and Affect: Mood normal.         Assessment and Plan:     1. Hypothyroidism due to Hashimoto's thyroiditis  Patient has a chronic history of Hashimoto's thyroiditis since the past 10 years and is compliant on levothyroxine.  TSH levels were high " at 13.6 in 03/2025 when patient was compliant on levothyroxine 175 mcg.  This dose was increased to 200 mcg in the last visit.  Patient's repeat TSH is low at 0.07.  Patient states she has a chronic history of hypothyroidism and has been on several different doses in the past.  She was previously being followed by endocrinology for this and she would take levothyroxine 6 days/week and skip a dose on Sundays.  Patient states she is scheduled to see endocrinology in a month for her diabetic pump.  Patient denies associated symptoms.  - Advised to take levothyroxine 200 mcg every Monday, Wednesday, Friday.  - Advised to take levothyroxine 175 mcg every Tuesday, Thursday, Saturday.  Patient to skip her dose on Sundays. (Total cumulative dose 1125 mcg/week)  - Follow-up on TSH, T3, T4 levels in the next visit.  - Continue to monitor for symptoms.  - Encouraged to discuss this with endocrinology and her next appointment.    - TSH; Future  - FREE THYROXINE; Future  - TRIIDOTHYRONINE; Future  - levothyroxine (SYNTHROID) 175 MCG Tab; Take 1 Tablet by mouth every morning on an empty stomach.  Dispense: 30 Tablet; Refill: 2    2. Hot flashes due to menopause  Patient reports having hot flashes since several months.  States her cycles have been irregular as she is nearing menopause, LMP 3 weeks ago.  Patient requested referral for gynecology regarding this.  - Referral to gynecology placed.    - Referral to Gynecology    3. Screening for STDs (sexually transmitted diseases) (Primary)  Patient states that she is in a new relationship and requested to be screened for STDs.  - Follow-up on HIV, hepatitis C, chlamydia, gonorrhea and T. pallidum screening tests.    - HIV AG/AB COMBO ASSAY SCREENING; Future  - HEP C VIRUS ANTIBODY; Future  - Chlaymydia & N. Gonorrhea; Future  - T.PALLIDUM AB VLAD (SCREENING); Future    4. Screening-pulmonary TB  Patient requested a TB QuantiFERON test for work.  - Follow-up on results in the next  visit.    - Quantiferon Gold TB (PPD); Future      Orders Placed This Encounter    Quantiferon Gold TB (PPD)    HIV AG/AB COMBO ASSAY SCREENING    HEP C VIRUS ANTIBODY    Chlaymydia & N. Gonorrhea    T.PALLIDUM AB VLAD (SCREENING)    TSH    FREE THYROXINE    TRIIDOTHYRONINE    Referral to Gynecology    levothyroxine (SYNTHROID) 175 MCG Tab       Return for with PCP.      Patient case was seen/ assessed/ discussed with Dr. Alejo      Please note that this dictation was created using voice recognition software. I have made every reasonable attempt to correct obvious errors, but I expect that there are errors of grammar and possibly content that I did not discover before finalizing the note.       Signed by:    Pretty Mao MD MPH  PGY-1 Internal Medicine Resident

## 2025-06-11 DIAGNOSIS — E06.3 HYPOTHYROIDISM DUE TO HASHIMOTO'S THYROIDITIS: Primary | ICD-10-CM

## 2025-06-13 NOTE — Clinical Note
REFERRAL APPROVAL NOTICE         Sent on June 13, 2025                   aKren Diana  [address] [place] [address] [address] [place] [address]                   Dear Ms. Diana,    After a careful review of the medical information and benefit coverage, [facility] has processed your referral. See below for additional details.    If applicable, you must be actively enrolled with your insurance for coverage of the authorized service. If you have any questions regarding your coverage, please contact your insurance directly.    REFERRAL INFORMATION   Referral #:  [id number]  Referred-To Department    Referred-By Provider:  OB/Gyn    Pretty Mao M.D.   [facility]      [address]   [place] [address]  [phone] [address] [facility], [address]  [place] [address] [phone]    Referral Start Date:  06/05/2025  Referral End Date:   06/05/2026             SCHEDULING  If you do not already have an appointment, please call [phone] to make an appointment.     MORE INFORMATION  If you do not already have a [email or web address] account, sign up at: [email or web address]  You can access your medical information, make appointments, see lab results, billing information, and more.  If you have questions regarding this referral, please contact  the [facility] Referrals department at:             [phone]. Monday - Friday 8:00AM - 5:00PM.     Sincerely,    [facility]

## 2025-06-18 ENCOUNTER — HOSPITAL ENCOUNTER (OUTPATIENT)
Dept: LAB | Facility: MEDICAL CENTER | Age: 50
End: 2025-06-18
Payer: COMMERCIAL

## 2025-06-18 DIAGNOSIS — Z11.1 SCREENING-PULMONARY TB: ICD-10-CM

## 2025-06-18 DIAGNOSIS — Z11.3 SCREENING FOR STDS (SEXUALLY TRANSMITTED DISEASES): ICD-10-CM

## 2025-06-18 LAB
HCV AB SER QL: NORMAL
HIV 1+2 AB+HIV1 P24 AG SERPL QL IA: NORMAL
T PALLIDUM AB SER QL IA: NORMAL

## 2025-06-18 PROCEDURE — 87389 HIV-1 AG W/HIV-1&-2 AB AG IA: CPT

## 2025-06-18 PROCEDURE — 86780 TREPONEMA PALLIDUM: CPT

## 2025-06-18 PROCEDURE — 86480 TB TEST CELL IMMUN MEASURE: CPT

## 2025-06-18 PROCEDURE — 36415 COLL VENOUS BLD VENIPUNCTURE: CPT

## 2025-06-18 PROCEDURE — 86803 HEPATITIS C AB TEST: CPT

## 2025-06-19 LAB
GAMMA INTERFERON BACKGROUND BLD IA-ACNC: 0.03 IU/ML
M TB IFN-G BLD-IMP: NEGATIVE
M TB IFN-G CD4+ BCKGRND COR BLD-ACNC: 0 IU/ML
MITOGEN IGNF BCKGRD COR BLD-ACNC: >10 IU/ML
QFT TB2 - NIL TBQ2: 0 IU/ML

## 2025-07-10 ENCOUNTER — APPOINTMENT (OUTPATIENT)
Dept: INTERNAL MEDICINE | Facility: OTHER | Age: 50
End: 2025-07-10
Payer: COMMERCIAL

## 2025-08-05 ENCOUNTER — APPOINTMENT (OUTPATIENT)
Dept: OBGYN | Facility: CLINIC | Age: 50
End: 2025-08-05
Payer: COMMERCIAL

## 2025-08-11 ENCOUNTER — HOSPITAL ENCOUNTER (OUTPATIENT)
Dept: LAB | Facility: MEDICAL CENTER | Age: 50
End: 2025-08-11
Attending: INTERNAL MEDICINE
Payer: COMMERCIAL

## 2025-08-11 LAB
25(OH)D3 SERPL-MCNC: 21 NG/ML (ref 30–100)
T3FREE SERPL-MCNC: 2.16 PG/ML (ref 2–4.4)
T4 FREE SERPL-MCNC: 1.41 NG/DL (ref 0.93–1.7)
TSH SERPL-ACNC: 2.36 UIU/ML (ref 0.38–5.33)
VIT B12 SERPL-MCNC: 378 PG/ML (ref 211–911)

## 2025-08-11 PROCEDURE — 36415 COLL VENOUS BLD VENIPUNCTURE: CPT

## 2025-08-11 PROCEDURE — 82306 VITAMIN D 25 HYDROXY: CPT

## 2025-08-11 PROCEDURE — 84481 FREE ASSAY (FT-3): CPT

## 2025-08-11 PROCEDURE — 82607 VITAMIN B-12: CPT

## 2025-08-11 PROCEDURE — 84439 ASSAY OF FREE THYROXINE: CPT

## 2025-08-11 PROCEDURE — 84443 ASSAY THYROID STIM HORMONE: CPT

## 2025-08-22 ENCOUNTER — HOSPITAL ENCOUNTER (OUTPATIENT)
Facility: MEDICAL CENTER | Age: 50
End: 2025-08-22
Payer: COMMERCIAL

## 2025-08-22 ENCOUNTER — OFFICE VISIT (OUTPATIENT)
Dept: URGENT CARE | Facility: PHYSICIAN GROUP | Age: 50
End: 2025-08-22
Payer: COMMERCIAL

## 2025-08-22 VITALS
BODY MASS INDEX: 27.12 KG/M2 | HEART RATE: 68 BPM | TEMPERATURE: 97.8 F | SYSTOLIC BLOOD PRESSURE: 114 MMHG | WEIGHT: 168 LBS | RESPIRATION RATE: 16 BRPM | OXYGEN SATURATION: 98 % | DIASTOLIC BLOOD PRESSURE: 70 MMHG

## 2025-08-22 DIAGNOSIS — N30.01 ACUTE CYSTITIS WITH HEMATURIA: Primary | ICD-10-CM

## 2025-08-22 DIAGNOSIS — N30.01 ACUTE CYSTITIS WITH HEMATURIA: ICD-10-CM

## 2025-08-22 LAB
APPEARANCE UR: CLEAR
BILIRUB UR STRIP-MCNC: NEGATIVE MG/DL
COLOR UR AUTO: NORMAL
GLUCOSE UR STRIP.AUTO-MCNC: 1000 MG/DL
KETONES UR STRIP.AUTO-MCNC: NEGATIVE MG/DL
LEUKOCYTE ESTERASE UR QL STRIP.AUTO: NORMAL
NITRITE UR QL STRIP.AUTO: NEGATIVE
PH UR STRIP.AUTO: 5.5 [PH] (ref 5–8)
POCT INT CON NEG: NEGATIVE
POCT INT CON POS: POSITIVE
POCT URINE PREGNANCY TEST: NEGATIVE
PROT UR QL STRIP: NEGATIVE MG/DL
RBC UR QL AUTO: NORMAL
SP GR UR STRIP.AUTO: 1
UROBILINOGEN UR STRIP-MCNC: 0.2 MG/DL

## 2025-08-22 PROCEDURE — 87186 SC STD MICRODIL/AGAR DIL: CPT

## 2025-08-22 PROCEDURE — 87077 CULTURE AEROBIC IDENTIFY: CPT

## 2025-08-22 PROCEDURE — 87086 URINE CULTURE/COLONY COUNT: CPT

## 2025-08-22 PROCEDURE — 99214 OFFICE O/P EST MOD 30 MIN: CPT

## 2025-08-22 PROCEDURE — 81002 URINALYSIS NONAUTO W/O SCOPE: CPT

## 2025-08-22 PROCEDURE — 3078F DIAST BP <80 MM HG: CPT

## 2025-08-22 PROCEDURE — 81025 URINE PREGNANCY TEST: CPT

## 2025-08-22 PROCEDURE — 3074F SYST BP LT 130 MM HG: CPT

## 2025-08-22 RX ORDER — NITROFURANTOIN 25; 75 MG/1; MG/1
100 CAPSULE ORAL EVERY 12 HOURS
Qty: 10 CAPSULE | Refills: 0 | Status: SHIPPED | OUTPATIENT
Start: 2025-08-22 | End: 2025-08-27

## 2025-08-22 ASSESSMENT — ENCOUNTER SYMPTOMS
VOMITING: 0
NAUSEA: 0
FLANK PAIN: 0
FEVER: 0

## 2025-08-22 ASSESSMENT — FIBROSIS 4 INDEX: FIB4 SCORE: 0.72

## 2025-08-25 LAB
BACTERIA UR CULT: ABNORMAL
BACTERIA UR CULT: ABNORMAL
SIGNIFICANT IND 70042: ABNORMAL
SITE SITE: ABNORMAL
SOURCE SOURCE: ABNORMAL

## 2025-09-26 ENCOUNTER — APPOINTMENT (OUTPATIENT)
Dept: INTERNAL MEDICINE | Facility: OTHER | Age: 50
End: 2025-09-26
Payer: COMMERCIAL